# Patient Record
Sex: MALE | Race: WHITE | URBAN - METROPOLITAN AREA
[De-identification: names, ages, dates, MRNs, and addresses within clinical notes are randomized per-mention and may not be internally consistent; named-entity substitution may affect disease eponyms.]

---

## 2017-01-01 ENCOUNTER — APPOINTMENT (OUTPATIENT)
Dept: GENERAL RADIOLOGY | Facility: CLINIC | Age: 67
DRG: 064 | End: 2017-01-01
Attending: INTERNAL MEDICINE
Payer: MEDICARE

## 2017-01-01 ENCOUNTER — HOSPITAL ENCOUNTER (INPATIENT)
Facility: CLINIC | Age: 67
LOS: 8 days | Discharge: HOSPICE | DRG: 064 | End: 2017-06-03
Attending: EMERGENCY MEDICINE | Admitting: PSYCHIATRY & NEUROLOGY
Payer: MEDICARE

## 2017-01-01 ENCOUNTER — APPOINTMENT (OUTPATIENT)
Dept: MRI IMAGING | Facility: CLINIC | Age: 67
DRG: 064 | End: 2017-01-01
Attending: PSYCHIATRY & NEUROLOGY
Payer: MEDICARE

## 2017-01-01 ENCOUNTER — APPOINTMENT (OUTPATIENT)
Dept: CT IMAGING | Facility: CLINIC | Age: 67
DRG: 064 | End: 2017-01-01
Attending: EMERGENCY MEDICINE
Payer: MEDICARE

## 2017-01-01 ENCOUNTER — APPOINTMENT (OUTPATIENT)
Dept: PHYSICAL THERAPY | Facility: CLINIC | Age: 67
DRG: 064 | End: 2017-01-01
Attending: INTERNAL MEDICINE
Payer: MEDICARE

## 2017-01-01 ENCOUNTER — APPOINTMENT (OUTPATIENT)
Dept: OCCUPATIONAL THERAPY | Facility: CLINIC | Age: 67
DRG: 064 | End: 2017-01-01
Attending: INTERNAL MEDICINE
Payer: MEDICARE

## 2017-01-01 ENCOUNTER — APPOINTMENT (OUTPATIENT)
Dept: CARDIOLOGY | Facility: CLINIC | Age: 67
DRG: 064 | End: 2017-01-01
Attending: NURSE PRACTITIONER
Payer: MEDICARE

## 2017-01-01 ENCOUNTER — APPOINTMENT (OUTPATIENT)
Dept: GENERAL RADIOLOGY | Facility: CLINIC | Age: 67
DRG: 064 | End: 2017-01-01
Attending: NURSE PRACTITIONER
Payer: MEDICARE

## 2017-01-01 ENCOUNTER — APPOINTMENT (OUTPATIENT)
Dept: GENERAL RADIOLOGY | Facility: CLINIC | Age: 67
DRG: 064 | End: 2017-01-01
Attending: PSYCHIATRY & NEUROLOGY
Payer: MEDICARE

## 2017-01-01 VITALS
WEIGHT: 121.03 LBS | BODY MASS INDEX: 20.17 KG/M2 | HEIGHT: 65 IN | RESPIRATION RATE: 16 BRPM | SYSTOLIC BLOOD PRESSURE: 130 MMHG | DIASTOLIC BLOOD PRESSURE: 89 MMHG | TEMPERATURE: 98.3 F | OXYGEN SATURATION: 92 %

## 2017-01-01 DIAGNOSIS — K59.03 DRUG-INDUCED CONSTIPATION: ICD-10-CM

## 2017-01-01 DIAGNOSIS — R53.1 LEFT-SIDED WEAKNESS: ICD-10-CM

## 2017-01-01 DIAGNOSIS — B37.0 THRUSH: ICD-10-CM

## 2017-01-01 DIAGNOSIS — G40.909 RECURRENT SEIZURES (H): ICD-10-CM

## 2017-01-01 DIAGNOSIS — J96.01 ACUTE RESPIRATORY FAILURE WITH HYPOXIA (H): ICD-10-CM

## 2017-01-01 DIAGNOSIS — F17.219 CIGARETTE NICOTINE DEPENDENCE WITH NICOTINE-INDUCED DISORDER: ICD-10-CM

## 2017-01-01 DIAGNOSIS — I63.9 CEREBROVASCULAR ACCIDENT (CVA), UNSPECIFIED MECHANISM (H): Primary | ICD-10-CM

## 2017-01-01 LAB
ALBUMIN SERPL-MCNC: 2.1 G/DL (ref 3.4–5)
ALBUMIN SERPL-MCNC: 2.5 G/DL (ref 3.4–5)
ALBUMIN SERPL-MCNC: 2.8 G/DL (ref 3.4–5)
ALBUMIN SERPL-MCNC: 3.2 G/DL (ref 3.4–5)
ALBUMIN UR-MCNC: NEGATIVE MG/DL
ALP SERPL-CCNC: 209 U/L (ref 40–150)
ALP SERPL-CCNC: 52 U/L (ref 40–150)
ALP SERPL-CCNC: 54 U/L (ref 40–150)
ALP SERPL-CCNC: 57 U/L (ref 40–150)
ALT SERPL W P-5'-P-CCNC: 17 U/L (ref 0–70)
ALT SERPL W P-5'-P-CCNC: 18 U/L (ref 0–70)
ALT SERPL W P-5'-P-CCNC: 18 U/L (ref 0–70)
ALT SERPL W P-5'-P-CCNC: 25 U/L (ref 0–70)
AMMONIA PLAS-SCNC: 24 UMOL/L (ref 10–50)
AMPHETAMINES UR QL SCN: ABNORMAL
AMPHETAMINES UR QL SCN: ABNORMAL
ANION GAP SERPL CALCULATED.3IONS-SCNC: 10 MMOL/L (ref 3–14)
ANION GAP SERPL CALCULATED.3IONS-SCNC: 13 MMOL/L (ref 3–14)
ANION GAP SERPL CALCULATED.3IONS-SCNC: 6 MMOL/L (ref 3–14)
ANION GAP SERPL CALCULATED.3IONS-SCNC: 7 MMOL/L (ref 3–14)
ANION GAP SERPL CALCULATED.3IONS-SCNC: 7 MMOL/L (ref 3–14)
ANION GAP SERPL CALCULATED.3IONS-SCNC: 8 MMOL/L (ref 3–14)
ANION GAP SERPL CALCULATED.3IONS-SCNC: 9 MMOL/L (ref 3–14)
APPEARANCE UR: CLEAR
APTT PPP: 28 SEC (ref 22–37)
AST SERPL W P-5'-P-CCNC: 22 U/L (ref 0–45)
AST SERPL W P-5'-P-CCNC: 25 U/L (ref 0–45)
AST SERPL W P-5'-P-CCNC: 31 U/L (ref 0–45)
AST SERPL W P-5'-P-CCNC: 56 U/L (ref 0–45)
BACTERIA SPEC CULT: NO GROWTH
BACTERIA SPEC CULT: NO GROWTH
BARBITURATES UR QL: ABNORMAL
BARBITURATES UR QL: ABNORMAL
BASE DEFICIT BLDA-SCNC: 1.2 MMOL/L
BASE DEFICIT BLDA-SCNC: 1.9 MMOL/L
BASE DEFICIT BLDA-SCNC: 3.9 MMOL/L
BASE DEFICIT BLDV-SCNC: 3.3 MMOL/L
BASE DEFICIT BLDV-SCNC: 6.4 MMOL/L
BASOPHILS # BLD AUTO: 0 10E9/L (ref 0–0.2)
BASOPHILS # BLD AUTO: 0 10E9/L (ref 0–0.2)
BASOPHILS NFR BLD AUTO: 0.2 %
BASOPHILS NFR BLD AUTO: 0.2 %
BENZODIAZ UR QL: ABNORMAL
BENZODIAZ UR QL: ABNORMAL
BILIRUB DIRECT SERPL-MCNC: 0.1 MG/DL (ref 0–0.2)
BILIRUB SERPL-MCNC: 0.4 MG/DL (ref 0.2–1.3)
BILIRUB SERPL-MCNC: 0.5 MG/DL (ref 0.2–1.3)
BILIRUB SERPL-MCNC: 0.6 MG/DL (ref 0.2–1.3)
BILIRUB SERPL-MCNC: 0.6 MG/DL (ref 0.2–1.3)
BILIRUB UR QL STRIP: NEGATIVE
BUN SERPL-MCNC: 10 MG/DL (ref 7–30)
BUN SERPL-MCNC: 10 MG/DL (ref 7–30)
BUN SERPL-MCNC: 12 MG/DL (ref 7–30)
BUN SERPL-MCNC: 13 MG/DL (ref 7–30)
BUN SERPL-MCNC: 14 MG/DL (ref 7–30)
BUN SERPL-MCNC: 15 MG/DL (ref 7–30)
BUN SERPL-MCNC: 17 MG/DL (ref 7–30)
CALCIUM SERPL-MCNC: 7.7 MG/DL (ref 8.5–10.1)
CALCIUM SERPL-MCNC: 8 MG/DL (ref 8.5–10.1)
CALCIUM SERPL-MCNC: 8.2 MG/DL (ref 8.5–10.1)
CALCIUM SERPL-MCNC: 8.3 MG/DL (ref 8.5–10.1)
CALCIUM SERPL-MCNC: 8.4 MG/DL (ref 8.5–10.1)
CALCIUM SERPL-MCNC: 8.6 MG/DL (ref 8.5–10.1)
CALCIUM SERPL-MCNC: 8.6 MG/DL (ref 8.5–10.1)
CANNABINOIDS UR QL SCN: ABNORMAL
CANNABINOIDS UR QL SCN: ABNORMAL
CHLORIDE SERPL-SCNC: 103 MMOL/L (ref 94–109)
CHLORIDE SERPL-SCNC: 104 MMOL/L (ref 94–109)
CHLORIDE SERPL-SCNC: 105 MMOL/L (ref 94–109)
CHLORIDE SERPL-SCNC: 106 MMOL/L (ref 94–109)
CHLORIDE SERPL-SCNC: 107 MMOL/L (ref 94–109)
CHLORIDE SERPL-SCNC: 109 MMOL/L (ref 94–109)
CHLORIDE SERPL-SCNC: 112 MMOL/L (ref 94–109)
CO2 SERPL-SCNC: 20 MMOL/L (ref 20–32)
CO2 SERPL-SCNC: 22 MMOL/L (ref 20–32)
CO2 SERPL-SCNC: 23 MMOL/L (ref 20–32)
CO2 SERPL-SCNC: 23 MMOL/L (ref 20–32)
CO2 SERPL-SCNC: 25 MMOL/L (ref 20–32)
COCAINE UR QL: ABNORMAL
COCAINE UR QL: ABNORMAL
COLOR UR AUTO: ABNORMAL
CREAT SERPL-MCNC: 0.56 MG/DL (ref 0.66–1.25)
CREAT SERPL-MCNC: 0.61 MG/DL (ref 0.66–1.25)
CREAT SERPL-MCNC: 0.7 MG/DL (ref 0.66–1.25)
CREAT SERPL-MCNC: 0.83 MG/DL (ref 0.66–1.25)
CREAT SERPL-MCNC: 0.83 MG/DL (ref 0.66–1.25)
CREAT SERPL-MCNC: 0.87 MG/DL (ref 0.66–1.25)
CREAT SERPL-MCNC: 1.2 MG/DL (ref 0.66–1.25)
DEPRECATED CALCIDIOL+CALCIFEROL SERPL-MC: 11 UG/L (ref 20–75)
DIFFERENTIAL METHOD BLD: ABNORMAL
DIFFERENTIAL METHOD BLD: ABNORMAL
EOSINOPHIL # BLD AUTO: 0 10E9/L (ref 0–0.7)
EOSINOPHIL # BLD AUTO: 0.1 10E9/L (ref 0–0.7)
EOSINOPHIL NFR BLD AUTO: 0.2 %
EOSINOPHIL NFR BLD AUTO: 0.4 %
ERYTHROCYTE [DISTWIDTH] IN BLOOD BY AUTOMATED COUNT: 14.4 % (ref 10–15)
ERYTHROCYTE [DISTWIDTH] IN BLOOD BY AUTOMATED COUNT: 14.5 % (ref 10–15)
ERYTHROCYTE [DISTWIDTH] IN BLOOD BY AUTOMATED COUNT: 15 % (ref 10–15)
ERYTHROCYTE [DISTWIDTH] IN BLOOD BY AUTOMATED COUNT: 15.1 % (ref 10–15)
ERYTHROCYTE [DISTWIDTH] IN BLOOD BY AUTOMATED COUNT: 15.3 % (ref 10–15)
ERYTHROCYTE [DISTWIDTH] IN BLOOD BY AUTOMATED COUNT: 15.6 % (ref 10–15)
ERYTHROCYTE [DISTWIDTH] IN BLOOD BY AUTOMATED COUNT: 15.7 % (ref 10–15)
ETHANOL UR QL SCN: ABNORMAL
GFR SERPL CREATININE-BSD FRML MDRD: 61 ML/MIN/1.7M2
GFR SERPL CREATININE-BSD FRML MDRD: 89 ML/MIN/1.7M2
GFR SERPL CREATININE-BSD FRML MDRD: ABNORMAL ML/MIN/1.7M2
GLUCOSE BLDC GLUCOMTR-MCNC: 156 MG/DL (ref 70–99)
GLUCOSE BLDC GLUCOMTR-MCNC: 176 MG/DL (ref 70–99)
GLUCOSE BLDC GLUCOMTR-MCNC: 196 MG/DL (ref 70–99)
GLUCOSE BLDC GLUCOMTR-MCNC: 98 MG/DL (ref 70–99)
GLUCOSE SERPL-MCNC: 101 MG/DL (ref 70–99)
GLUCOSE SERPL-MCNC: 104 MG/DL (ref 70–99)
GLUCOSE SERPL-MCNC: 108 MG/DL (ref 70–99)
GLUCOSE SERPL-MCNC: 125 MG/DL (ref 70–99)
GLUCOSE SERPL-MCNC: 156 MG/DL (ref 70–99)
GLUCOSE SERPL-MCNC: 163 MG/DL (ref 70–99)
GLUCOSE SERPL-MCNC: 62 MG/DL (ref 70–99)
GLUCOSE UR STRIP-MCNC: 70 MG/DL
HCO3 BLD-SCNC: 21 MMOL/L (ref 21–28)
HCO3 BLD-SCNC: 22 MMOL/L (ref 21–28)
HCO3 BLD-SCNC: 23 MMOL/L (ref 21–28)
HCO3 BLDV-SCNC: 20 MMOL/L (ref 21–28)
HCO3 BLDV-SCNC: 21 MMOL/L (ref 21–28)
HCT VFR BLD AUTO: 31.2 % (ref 40–53)
HCT VFR BLD AUTO: 32.2 % (ref 40–53)
HCT VFR BLD AUTO: 34.7 % (ref 40–53)
HCT VFR BLD AUTO: 34.7 % (ref 40–53)
HCT VFR BLD AUTO: 35.7 % (ref 40–53)
HCT VFR BLD AUTO: 35.8 % (ref 40–53)
HCT VFR BLD AUTO: 36.6 % (ref 40–53)
HGB BLD-MCNC: 10.3 G/DL (ref 13.3–17.7)
HGB BLD-MCNC: 10.8 G/DL (ref 13.3–17.7)
HGB BLD-MCNC: 11.6 G/DL (ref 13.3–17.7)
HGB BLD-MCNC: 11.8 G/DL (ref 13.3–17.7)
HGB BLD-MCNC: 11.8 G/DL (ref 13.3–17.7)
HGB BLD-MCNC: 11.9 G/DL (ref 13.3–17.7)
HGB BLD-MCNC: 12.6 G/DL (ref 13.3–17.7)
HGB UR QL STRIP: NEGATIVE
HYALINE CASTS #/AREA URNS LPF: 1 /LPF (ref 0–2)
IMM GRANULOCYTES # BLD: 0.1 10E9/L (ref 0–0.4)
IMM GRANULOCYTES # BLD: 0.1 10E9/L (ref 0–0.4)
IMM GRANULOCYTES NFR BLD: 0.5 %
IMM GRANULOCYTES NFR BLD: 0.5 %
INR PPP: 1.1 (ref 0.86–1.14)
INTERPRETATION ECG - MUSE: NORMAL
KETONES UR STRIP-MCNC: NEGATIVE MG/DL
LACTATE BLD-SCNC: 1.2 MMOL/L (ref 0.7–2.1)
LACTATE BLD-SCNC: 2.1 MMOL/L (ref 0.7–2.1)
LACTATE BLD-SCNC: 2.7 MMOL/L (ref 0.7–2.1)
LACTATE BLD-SCNC: 3.4 MMOL/L (ref 0.7–2.1)
LACTATE BLD-SCNC: 3.6 MMOL/L (ref 0.7–2.1)
LACTATE BLD-SCNC: 4.1 MMOL/L (ref 0.7–2.1)
LEUKOCYTE ESTERASE UR QL STRIP: NEGATIVE
LYMPHOCYTES # BLD AUTO: 1 10E9/L (ref 0.8–5.3)
LYMPHOCYTES # BLD AUTO: 1.3 10E9/L (ref 0.8–5.3)
LYMPHOCYTES NFR BLD AUTO: 10.8 %
LYMPHOCYTES NFR BLD AUTO: 5.4 %
Lab: NORMAL
Lab: NORMAL
MAGNESIUM RBC-SCNC: 2.6 MMOL/L
MAGNESIUM SERPL-MCNC: 1.8 MG/DL (ref 1.6–2.3)
MAGNESIUM SERPL-MCNC: 1.8 MG/DL (ref 1.6–2.3)
MAGNESIUM SERPL-MCNC: 1.9 MG/DL (ref 1.6–2.3)
MAGNESIUM SERPL-MCNC: 1.9 MG/DL (ref 1.6–2.3)
MAGNESIUM SERPL-MCNC: 2 MG/DL (ref 1.6–2.3)
MAGNESIUM SERPL-MCNC: 2.1 MG/DL (ref 1.6–2.3)
MAGNESIUM SERPL-MCNC: 2.3 MG/DL (ref 1.6–2.3)
MAGNESIUM SERPL-MCNC: 2.5 MG/DL (ref 1.6–2.3)
MCH RBC QN AUTO: 30 PG (ref 26.5–33)
MCH RBC QN AUTO: 30.3 PG (ref 26.5–33)
MCH RBC QN AUTO: 30.3 PG (ref 26.5–33)
MCH RBC QN AUTO: 30.4 PG (ref 26.5–33)
MCH RBC QN AUTO: 30.5 PG (ref 26.5–33)
MCH RBC QN AUTO: 30.5 PG (ref 26.5–33)
MCH RBC QN AUTO: 30.7 PG (ref 26.5–33)
MCHC RBC AUTO-ENTMCNC: 33 G/DL (ref 31.5–36.5)
MCHC RBC AUTO-ENTMCNC: 33 G/DL (ref 31.5–36.5)
MCHC RBC AUTO-ENTMCNC: 33.3 G/DL (ref 31.5–36.5)
MCHC RBC AUTO-ENTMCNC: 33.4 G/DL (ref 31.5–36.5)
MCHC RBC AUTO-ENTMCNC: 33.5 G/DL (ref 31.5–36.5)
MCHC RBC AUTO-ENTMCNC: 34 G/DL (ref 31.5–36.5)
MCHC RBC AUTO-ENTMCNC: 34.4 G/DL (ref 31.5–36.5)
MCV RBC AUTO: 89 FL (ref 78–100)
MCV RBC AUTO: 90 FL (ref 78–100)
MCV RBC AUTO: 91 FL (ref 78–100)
MCV RBC AUTO: 92 FL (ref 78–100)
MICRO REPORT STATUS: NORMAL
MICRO REPORT STATUS: NORMAL
MONOCYTES # BLD AUTO: 0.5 10E9/L (ref 0–1.3)
MONOCYTES # BLD AUTO: 0.5 10E9/L (ref 0–1.3)
MONOCYTES NFR BLD AUTO: 2.6 %
MONOCYTES NFR BLD AUTO: 3.7 %
MRSA DNA SPEC QL NAA+PROBE: NORMAL
MUCOUS THREADS #/AREA URNS LPF: PRESENT /LPF
NEUTROPHILS # BLD AUTO: 10.3 10E9/L (ref 1.6–8.3)
NEUTROPHILS # BLD AUTO: 16.3 10E9/L (ref 1.6–8.3)
NEUTROPHILS NFR BLD AUTO: 84.6 %
NEUTROPHILS NFR BLD AUTO: 90.9 %
NITRATE UR QL: NEGATIVE
NRBC # BLD AUTO: 0 10*3/UL
NRBC # BLD AUTO: 0 10*3/UL
NRBC BLD AUTO-RTO: 0 /100
NRBC BLD AUTO-RTO: 0 /100
O2/TOTAL GAS SETTING VFR VENT: ABNORMAL %
OPIATES UR QL SCN: ABNORMAL
OPIATES UR QL SCN: ABNORMAL
OXYHGB MFR BLD: 97 % (ref 92–100)
OXYHGB MFR BLD: 98 % (ref 92–100)
OXYHGB MFR BLDV: 72 %
OXYHGB MFR BLDV: 81 %
PCO2 BLD: 32 MM HG (ref 35–45)
PCO2 BLD: 38 MM HG (ref 35–45)
PCO2 BLD: 39 MM HG (ref 35–45)
PCO2 BLDV: 33 MM HG (ref 40–50)
PCO2 BLDV: 41 MM HG (ref 40–50)
PCP UR QL SCN: ABNORMAL
PCP UR QL SCN: ABNORMAL
PH BLD: 7.34 PH (ref 7.35–7.45)
PH BLD: 7.4 PH (ref 7.35–7.45)
PH BLD: 7.44 PH (ref 7.35–7.45)
PH BLDV: 7.29 PH (ref 7.32–7.43)
PH BLDV: 7.41 PH (ref 7.32–7.43)
PH UR STRIP: 7 PH (ref 5–7)
PHENYTOIN FREE SERPL-MCNC: 1.35 UG/ML
PHENYTOIN FREE SERPL-MCNC: 1.68 UG/ML
PHENYTOIN SERPL-MCNC: 12.3 MG/L (ref 10–20)
PHENYTOIN SERPL-MCNC: 7 MG/L (ref 10–20)
PHOSPHATE SERPL-MCNC: 1.9 MG/DL (ref 2.5–4.5)
PHOSPHATE SERPL-MCNC: 2.6 MG/DL (ref 2.5–4.5)
PHOSPHATE SERPL-MCNC: 2.9 MG/DL (ref 2.5–4.5)
PHOSPHATE SERPL-MCNC: 3 MG/DL (ref 2.5–4.5)
PHOSPHATE SERPL-MCNC: 3.3 MG/DL (ref 2.5–4.5)
PHOSPHATE SERPL-MCNC: 3.3 MG/DL (ref 2.5–4.5)
PHOSPHATE SERPL-MCNC: 3.4 MG/DL (ref 2.5–4.5)
PHOSPHATE SERPL-MCNC: 3.9 MG/DL (ref 2.5–4.5)
PLATELET # BLD AUTO: 169 10E9/L (ref 150–450)
PLATELET # BLD AUTO: 173 10E9/L (ref 150–450)
PLATELET # BLD AUTO: 236 10E9/L (ref 150–450)
PLATELET # BLD AUTO: 237 10E9/L (ref 150–450)
PLATELET # BLD AUTO: 240 10E9/L (ref 150–450)
PLATELET # BLD AUTO: 271 10E9/L (ref 150–450)
PLATELET # BLD AUTO: 309 10E9/L (ref 150–450)
PO2 BLD: 109 MM HG (ref 80–105)
PO2 BLD: 111 MM HG (ref 80–105)
PO2 BLD: 151 MM HG (ref 80–105)
PO2 BLDV: 44 MM HG (ref 25–47)
PO2 BLDV: 45 MM HG (ref 25–47)
POTASSIUM SERPL-SCNC: 3.2 MMOL/L (ref 3.4–5.3)
POTASSIUM SERPL-SCNC: 3.7 MMOL/L (ref 3.4–5.3)
POTASSIUM SERPL-SCNC: 3.8 MMOL/L (ref 3.4–5.3)
POTASSIUM SERPL-SCNC: 3.9 MMOL/L (ref 3.4–5.3)
POTASSIUM SERPL-SCNC: 4.2 MMOL/L (ref 3.4–5.3)
POTASSIUM SERPL-SCNC: 4.5 MMOL/L (ref 3.4–5.3)
POTASSIUM SERPL-SCNC: 5 MMOL/L (ref 3.4–5.3)
PROCALCITONIN SERPL-MCNC: NORMAL NG/ML
PROT SERPL-MCNC: 6.3 G/DL (ref 6.8–8.8)
PROT SERPL-MCNC: 6.4 G/DL (ref 6.8–8.8)
PROT SERPL-MCNC: 6.4 G/DL (ref 6.8–8.8)
PROT SERPL-MCNC: 6.7 G/DL (ref 6.8–8.8)
RBC # BLD AUTO: 3.43 10E12/L (ref 4.4–5.9)
RBC # BLD AUTO: 3.54 10E12/L (ref 4.4–5.9)
RBC # BLD AUTO: 3.81 10E12/L (ref 4.4–5.9)
RBC # BLD AUTO: 3.87 10E12/L (ref 4.4–5.9)
RBC # BLD AUTO: 3.9 10E12/L (ref 4.4–5.9)
RBC # BLD AUTO: 3.93 10E12/L (ref 4.4–5.9)
RBC # BLD AUTO: 4.1 10E12/L (ref 4.4–5.9)
RBC #/AREA URNS AUTO: 0 /HPF (ref 0–2)
SODIUM SERPL-SCNC: 135 MMOL/L (ref 133–144)
SODIUM SERPL-SCNC: 135 MMOL/L (ref 133–144)
SODIUM SERPL-SCNC: 137 MMOL/L (ref 133–144)
SODIUM SERPL-SCNC: 138 MMOL/L (ref 133–144)
SODIUM SERPL-SCNC: 140 MMOL/L (ref 133–144)
SODIUM SERPL-SCNC: 140 MMOL/L (ref 133–144)
SODIUM SERPL-SCNC: 144 MMOL/L (ref 133–144)
SP GR UR STRIP: 1.01 (ref 1–1.03)
SPECIMEN SOURCE: NORMAL
TROPONIN I SERPL-MCNC: NORMAL UG/L (ref 0–0.04)
URN SPEC COLLECT METH UR: ABNORMAL
UROBILINOGEN UR STRIP-MCNC: NORMAL MG/DL (ref 0–2)
VIT B1 BLD-MCNC: 345 UG/DL
VIT B12 SERPL-MCNC: 312 PG/ML (ref 193–986)
VIT B6 SERPL-MCNC: 10.6 NG/ML
WBC # BLD AUTO: 12.2 10E9/L (ref 4–11)
WBC # BLD AUTO: 13.1 10E9/L (ref 4–11)
WBC # BLD AUTO: 13.8 10E9/L (ref 4–11)
WBC # BLD AUTO: 16.1 10E9/L (ref 4–11)
WBC # BLD AUTO: 18 10E9/L (ref 4–11)
WBC # BLD AUTO: 20.7 10E9/L (ref 4–11)
WBC # BLD AUTO: 8.9 10E9/L (ref 4–11)
WBC #/AREA URNS AUTO: <1 /HPF (ref 0–2)

## 2017-01-01 PROCEDURE — 99291 CRITICAL CARE FIRST HOUR: CPT | Performed by: NURSE PRACTITIONER

## 2017-01-01 PROCEDURE — A9270 NON-COVERED ITEM OR SERVICE: HCPCS | Mod: GY | Performed by: NURSE PRACTITIONER

## 2017-01-01 PROCEDURE — 83735 ASSAY OF MAGNESIUM: CPT | Performed by: NURSE PRACTITIONER

## 2017-01-01 PROCEDURE — 25000128 H RX IP 250 OP 636: Performed by: PSYCHIATRY & NEUROLOGY

## 2017-01-01 PROCEDURE — 25000128 H RX IP 250 OP 636: Performed by: NURSE PRACTITIONER

## 2017-01-01 PROCEDURE — 94640 AIRWAY INHALATION TREATMENT: CPT | Mod: 76

## 2017-01-01 PROCEDURE — 80185 ASSAY OF PHENYTOIN TOTAL: CPT | Performed by: INTERNAL MEDICINE

## 2017-01-01 PROCEDURE — 85027 COMPLETE CBC AUTOMATED: CPT | Performed by: NURSE PRACTITIONER

## 2017-01-01 PROCEDURE — 25000132 ZZH RX MED GY IP 250 OP 250 PS 637: Mod: GY | Performed by: NURSE PRACTITIONER

## 2017-01-01 PROCEDURE — 40000986 XR CHEST PORT 1 VW

## 2017-01-01 PROCEDURE — 25000125 ZZHC RX 250: Performed by: PSYCHIATRY & NEUROLOGY

## 2017-01-01 PROCEDURE — 25000125 ZZHC RX 250: Performed by: ANESTHESIOLOGY

## 2017-01-01 PROCEDURE — 84145 PROCALCITONIN (PCT): CPT | Performed by: PSYCHIATRY & NEUROLOGY

## 2017-01-01 PROCEDURE — 25000128 H RX IP 250 OP 636

## 2017-01-01 PROCEDURE — 70470 CT HEAD/BRAIN W/O & W/DYE: CPT | Mod: XS

## 2017-01-01 PROCEDURE — 97112 NEUROMUSCULAR REEDUCATION: CPT | Mod: GO

## 2017-01-01 PROCEDURE — 40000239 ZZH STATISTIC VAT ROUNDS

## 2017-01-01 PROCEDURE — 94003 VENT MGMT INPAT SUBQ DAY: CPT

## 2017-01-01 PROCEDURE — 71010 XR CHEST PORT 1 VW: CPT

## 2017-01-01 PROCEDURE — 99291 CRITICAL CARE FIRST HOUR: CPT | Performed by: INTERNAL MEDICINE

## 2017-01-01 PROCEDURE — 25000128 H RX IP 250 OP 636: Performed by: INTERNAL MEDICINE

## 2017-01-01 PROCEDURE — 83735 ASSAY OF MAGNESIUM: CPT | Performed by: INTERNAL MEDICINE

## 2017-01-01 PROCEDURE — 97162 PT EVAL MOD COMPLEX 30 MIN: CPT | Mod: GP | Performed by: PHYSICAL THERAPIST

## 2017-01-01 PROCEDURE — 84100 ASSAY OF PHOSPHORUS: CPT | Performed by: INTERNAL MEDICINE

## 2017-01-01 PROCEDURE — 25000125 ZZHC RX 250

## 2017-01-01 PROCEDURE — 70498 CT ANGIOGRAPHY NECK: CPT

## 2017-01-01 PROCEDURE — 80307 DRUG TEST PRSMV CHEM ANLYZR: CPT | Performed by: PSYCHIATRY & NEUROLOGY

## 2017-01-01 PROCEDURE — 25000125 ZZHC RX 250: Performed by: NURSE PRACTITIONER

## 2017-01-01 PROCEDURE — 94002 VENT MGMT INPAT INIT DAY: CPT

## 2017-01-01 PROCEDURE — 25800025 ZZH RX 258: Performed by: NURSE PRACTITIONER

## 2017-01-01 PROCEDURE — 5A1955Z RESPIRATORY VENTILATION, GREATER THAN 96 CONSECUTIVE HOURS: ICD-10-PCS | Performed by: EMERGENCY MEDICINE

## 2017-01-01 PROCEDURE — 95813 EEG EXTND MNTR 61-119 MIN: CPT

## 2017-01-01 PROCEDURE — 84207 ASSAY OF VITAMIN B-6: CPT | Performed by: PSYCHIATRY & NEUROLOGY

## 2017-01-01 PROCEDURE — 40000008 ZZH STATISTIC AIRWAY CARE

## 2017-01-01 PROCEDURE — 74000 XR ABDOMEN PORT F1 VW: CPT

## 2017-01-01 PROCEDURE — 87040 BLOOD CULTURE FOR BACTERIA: CPT | Performed by: NURSE PRACTITIONER

## 2017-01-01 PROCEDURE — 99239 HOSP IP/OBS DSCHRG MGMT >30: CPT | Performed by: INTERNAL MEDICINE

## 2017-01-01 PROCEDURE — 25000132 ZZH RX MED GY IP 250 OP 250 PS 637: Performed by: INTERNAL MEDICINE

## 2017-01-01 PROCEDURE — 85025 COMPLETE CBC W/AUTO DIFF WBC: CPT | Performed by: INTERNAL MEDICINE

## 2017-01-01 PROCEDURE — 20000003 ZZH R&B ICU

## 2017-01-01 PROCEDURE — 25000132 ZZH RX MED GY IP 250 OP 250 PS 637: Performed by: NURSE PRACTITIONER

## 2017-01-01 PROCEDURE — 99207 ZZC APP CREDIT; MD BILLING SHARED VISIT: CPT | Performed by: INTERNAL MEDICINE

## 2017-01-01 PROCEDURE — P9041 ALBUMIN (HUMAN),5%, 50ML: HCPCS

## 2017-01-01 PROCEDURE — 80053 COMPREHEN METABOLIC PANEL: CPT | Performed by: INTERNAL MEDICINE

## 2017-01-01 PROCEDURE — 94640 AIRWAY INHALATION TREATMENT: CPT

## 2017-01-01 PROCEDURE — 84100 ASSAY OF PHOSPHORUS: CPT | Performed by: PSYCHIATRY & NEUROLOGY

## 2017-01-01 PROCEDURE — 93306 TTE W/DOPPLER COMPLETE: CPT | Mod: 26 | Performed by: INTERNAL MEDICINE

## 2017-01-01 PROCEDURE — 80053 COMPREHEN METABOLIC PANEL: CPT | Performed by: NURSE PRACTITIONER

## 2017-01-01 PROCEDURE — 87640 STAPH A DNA AMP PROBE: CPT | Performed by: NURSE PRACTITIONER

## 2017-01-01 PROCEDURE — A9270 NON-COVERED ITEM OR SERVICE: HCPCS | Mod: GY | Performed by: PSYCHIATRY & NEUROLOGY

## 2017-01-01 PROCEDURE — 82805 BLOOD GASES W/O2 SATURATION: CPT | Performed by: INTERNAL MEDICINE

## 2017-01-01 PROCEDURE — 83605 ASSAY OF LACTIC ACID: CPT | Performed by: PSYCHIATRY & NEUROLOGY

## 2017-01-01 PROCEDURE — 85027 COMPLETE CBC AUTOMATED: CPT | Performed by: INTERNAL MEDICINE

## 2017-01-01 PROCEDURE — 36600 WITHDRAWAL OF ARTERIAL BLOOD: CPT

## 2017-01-01 PROCEDURE — 40000275 ZZH STATISTIC RCP TIME EA 10 MIN

## 2017-01-01 PROCEDURE — 97535 SELF CARE MNGMENT TRAINING: CPT | Mod: GO

## 2017-01-01 PROCEDURE — 85610 PROTHROMBIN TIME: CPT | Performed by: EMERGENCY MEDICINE

## 2017-01-01 PROCEDURE — 40000133 ZZH STATISTIC OT WARD VISIT

## 2017-01-01 PROCEDURE — 70551 MRI BRAIN STEM W/O DYE: CPT

## 2017-01-01 PROCEDURE — 25800025 ZZH RX 258: Performed by: INTERNAL MEDICINE

## 2017-01-01 PROCEDURE — 82805 BLOOD GASES W/O2 SATURATION: CPT | Performed by: EMERGENCY MEDICINE

## 2017-01-01 PROCEDURE — 00000146 ZZHCL STATISTIC GLUCOSE BY METER IP

## 2017-01-01 PROCEDURE — 84100 ASSAY OF PHOSPHORUS: CPT | Performed by: NURSE PRACTITIONER

## 2017-01-01 PROCEDURE — 25000125 ZZHC RX 250: Performed by: SURGERY

## 2017-01-01 PROCEDURE — 80048 BASIC METABOLIC PNL TOTAL CA: CPT | Performed by: INTERNAL MEDICINE

## 2017-01-01 PROCEDURE — 99223 1ST HOSP IP/OBS HIGH 75: CPT | Performed by: PHYSICIAN ASSISTANT

## 2017-01-01 PROCEDURE — 82805 BLOOD GASES W/O2 SATURATION: CPT | Performed by: NURSE PRACTITIONER

## 2017-01-01 PROCEDURE — 99207 ZZC CDG-CORRECTLY CODED, REVIEWED AND AGREE: CPT | Performed by: NURSE PRACTITIONER

## 2017-01-01 PROCEDURE — 12000000 ZZH R&B MED SURG/OB

## 2017-01-01 PROCEDURE — 25000132 ZZH RX MED GY IP 250 OP 250 PS 637: Mod: GY | Performed by: PSYCHIATRY & NEUROLOGY

## 2017-01-01 PROCEDURE — 82140 ASSAY OF AMMONIA: CPT | Performed by: INTERNAL MEDICINE

## 2017-01-01 PROCEDURE — 80048 BASIC METABOLIC PNL TOTAL CA: CPT | Performed by: NURSE PRACTITIONER

## 2017-01-01 PROCEDURE — 94660 CPAP INITIATION&MGMT: CPT

## 2017-01-01 PROCEDURE — 25000125 ZZHC RX 250: Performed by: EMERGENCY MEDICINE

## 2017-01-01 PROCEDURE — 82306 VITAMIN D 25 HYDROXY: CPT | Performed by: PSYCHIATRY & NEUROLOGY

## 2017-01-01 PROCEDURE — 40000264 ECHO COMPLETE WITH LUMASON

## 2017-01-01 PROCEDURE — 87641 MR-STAPH DNA AMP PROBE: CPT | Performed by: NURSE PRACTITIONER

## 2017-01-01 PROCEDURE — 84425 ASSAY OF VITAMIN B-1: CPT | Performed by: PSYCHIATRY & NEUROLOGY

## 2017-01-01 PROCEDURE — 27210437 ZZH NUTRITION PRODUCT SEMIELEM INTERMED LITER

## 2017-01-01 PROCEDURE — 25000125 ZZHC RX 250: Performed by: INTERNAL MEDICINE

## 2017-01-01 PROCEDURE — 99233 SBSQ HOSP IP/OBS HIGH 50: CPT | Performed by: INTERNAL MEDICINE

## 2017-01-01 PROCEDURE — 93005 ELECTROCARDIOGRAM TRACING: CPT

## 2017-01-01 PROCEDURE — 84100 ASSAY OF PHOSPHORUS: CPT | Performed by: EMERGENCY MEDICINE

## 2017-01-01 PROCEDURE — 97530 THERAPEUTIC ACTIVITIES: CPT | Mod: GO

## 2017-01-01 PROCEDURE — 97530 THERAPEUTIC ACTIVITIES: CPT | Mod: GP | Performed by: PHYSICAL THERAPIST

## 2017-01-01 PROCEDURE — 99232 SBSQ HOSP IP/OBS MODERATE 35: CPT | Performed by: INTERNAL MEDICINE

## 2017-01-01 PROCEDURE — 25000128 H RX IP 250 OP 636: Performed by: EMERGENCY MEDICINE

## 2017-01-01 PROCEDURE — 36569 INSJ PICC 5 YR+ W/O IMAGING: CPT

## 2017-01-01 PROCEDURE — 83605 ASSAY OF LACTIC ACID: CPT | Performed by: INTERNAL MEDICINE

## 2017-01-01 PROCEDURE — 40000193 ZZH STATISTIC PT WARD VISIT: Performed by: PHYSICAL THERAPIST

## 2017-01-01 PROCEDURE — S5010 5% DEXTROSE AND 0.45% SALINE: HCPCS | Performed by: INTERNAL MEDICINE

## 2017-01-01 PROCEDURE — 80186 ASSAY OF PHENYTOIN FREE: CPT | Performed by: INTERNAL MEDICINE

## 2017-01-01 PROCEDURE — 85025 COMPLETE CBC W/AUTO DIFF WBC: CPT | Performed by: EMERGENCY MEDICINE

## 2017-01-01 PROCEDURE — 70450 CT HEAD/BRAIN W/O DYE: CPT

## 2017-01-01 PROCEDURE — 99223 1ST HOSP IP/OBS HIGH 75: CPT | Performed by: NURSE PRACTITIONER

## 2017-01-01 PROCEDURE — 25000125 ZZHC RX 250: Performed by: PHYSICIAN ASSISTANT

## 2017-01-01 PROCEDURE — 84484 ASSAY OF TROPONIN QUANT: CPT | Performed by: PSYCHIATRY & NEUROLOGY

## 2017-01-01 PROCEDURE — 27210219 ZZH KIT SHRLOCK 5FR DBL LUM PWR P

## 2017-01-01 PROCEDURE — 99211 OFF/OP EST MAY X REQ PHY/QHP: CPT

## 2017-01-01 PROCEDURE — 40000281 ZZH STATISTIC TRANSPORT TIME EA 15 MIN

## 2017-01-01 PROCEDURE — 40000257 ZZH STATISTIC CONSULT NO CHARGE VASC ACCESS

## 2017-01-01 PROCEDURE — 87040 BLOOD CULTURE FOR BACTERIA: CPT | Performed by: EMERGENCY MEDICINE

## 2017-01-01 PROCEDURE — 83605 ASSAY OF LACTIC ACID: CPT | Performed by: EMERGENCY MEDICINE

## 2017-01-01 PROCEDURE — 83735 ASSAY OF MAGNESIUM: CPT | Performed by: EMERGENCY MEDICINE

## 2017-01-01 PROCEDURE — 85730 THROMBOPLASTIN TIME PARTIAL: CPT | Performed by: EMERGENCY MEDICINE

## 2017-01-01 PROCEDURE — 80048 BASIC METABOLIC PNL TOTAL CA: CPT | Performed by: EMERGENCY MEDICINE

## 2017-01-01 PROCEDURE — 83735 ASSAY OF MAGNESIUM: CPT | Performed by: PSYCHIATRY & NEUROLOGY

## 2017-01-01 PROCEDURE — 25500064 ZZH RX 255 OP 636: Performed by: PSYCHIATRY & NEUROLOGY

## 2017-01-01 PROCEDURE — 93010 ELECTROCARDIOGRAM REPORT: CPT | Performed by: INTERNAL MEDICINE

## 2017-01-01 PROCEDURE — 99233 SBSQ HOSP IP/OBS HIGH 50: CPT | Performed by: NURSE PRACTITIONER

## 2017-01-01 PROCEDURE — 82803 BLOOD GASES ANY COMBINATION: CPT | Performed by: PSYCHIATRY & NEUROLOGY

## 2017-01-01 PROCEDURE — 97167 OT EVAL HIGH COMPLEX 60 MIN: CPT | Mod: GO

## 2017-01-01 PROCEDURE — 40000061 ZZH STATISTIC EEG TIME EA 10 MIN

## 2017-01-01 PROCEDURE — 80076 HEPATIC FUNCTION PANEL: CPT | Performed by: PSYCHIATRY & NEUROLOGY

## 2017-01-01 PROCEDURE — 81001 URINALYSIS AUTO W/SCOPE: CPT | Performed by: NURSE PRACTITIONER

## 2017-01-01 RX ORDER — NICOTINE 21 MG/24HR
1 PATCH, TRANSDERMAL 24 HOURS TRANSDERMAL DAILY
Status: DISCONTINUED | OUTPATIENT
Start: 2017-01-01 | End: 2017-01-01 | Stop reason: HOSPADM

## 2017-01-01 RX ORDER — LORAZEPAM 0.5 MG/1
.5-1 TABLET ORAL
Status: DISCONTINUED | OUTPATIENT
Start: 2017-01-01 | End: 2017-01-01 | Stop reason: HOSPADM

## 2017-01-01 RX ORDER — PROPOFOL 10 MG/ML
INJECTION, EMULSION INTRAVENOUS
Status: COMPLETED
Start: 2017-01-01 | End: 2017-01-01

## 2017-01-01 RX ORDER — PIPERACILLIN SODIUM, TAZOBACTAM SODIUM 3; .375 G/15ML; G/15ML
3.38 INJECTION, POWDER, LYOPHILIZED, FOR SOLUTION INTRAVENOUS EVERY 6 HOURS
Status: DISCONTINUED | OUTPATIENT
Start: 2017-01-01 | End: 2017-01-01

## 2017-01-01 RX ORDER — BISACODYL 10 MG
10 SUPPOSITORY, RECTAL RECTAL
Qty: 30 SUPPOSITORY | Refills: 0 | Status: SHIPPED | OUTPATIENT
Start: 2017-01-01

## 2017-01-01 RX ORDER — POTASSIUM CHLORIDE 1.5 G/1.58G
20-40 POWDER, FOR SOLUTION ORAL
Status: DISCONTINUED | OUTPATIENT
Start: 2017-01-01 | End: 2017-01-01

## 2017-01-01 RX ORDER — ONDANSETRON 2 MG/ML
4 INJECTION INTRAMUSCULAR; INTRAVENOUS EVERY 6 HOURS PRN
Status: DISCONTINUED | OUTPATIENT
Start: 2017-01-01 | End: 2017-01-01

## 2017-01-01 RX ORDER — LORAZEPAM 2 MG/ML
4 INJECTION INTRAMUSCULAR ONCE
Status: COMPLETED | OUTPATIENT
Start: 2017-01-01 | End: 2017-01-01

## 2017-01-01 RX ORDER — MAGNESIUM SULFATE HEPTAHYDRATE 40 MG/ML
4 INJECTION, SOLUTION INTRAVENOUS EVERY 4 HOURS PRN
Status: DISCONTINUED | OUTPATIENT
Start: 2017-01-01 | End: 2017-01-01

## 2017-01-01 RX ORDER — PROPOFOL 10 MG/ML
0-40 INJECTION, EMULSION INTRAVENOUS CONTINUOUS
Status: DISCONTINUED | OUTPATIENT
Start: 2017-01-01 | End: 2017-01-01

## 2017-01-01 RX ORDER — ALBUTEROL SULFATE 0.83 MG/ML
2.5 SOLUTION RESPIRATORY (INHALATION)
Status: DISCONTINUED | OUTPATIENT
Start: 2017-01-01 | End: 2017-01-01

## 2017-01-01 RX ORDER — LIDOCAINE 40 MG/G
CREAM TOPICAL
Status: DISCONTINUED | OUTPATIENT
Start: 2017-01-01 | End: 2017-01-01

## 2017-01-01 RX ORDER — AMOXICILLIN 250 MG
1-2 CAPSULE ORAL 2 TIMES DAILY PRN
Status: DISCONTINUED | OUTPATIENT
Start: 2017-01-01 | End: 2017-01-01

## 2017-01-01 RX ORDER — LORAZEPAM 2 MG/ML
INJECTION INTRAMUSCULAR
Status: COMPLETED
Start: 2017-01-01 | End: 2017-01-01

## 2017-01-01 RX ORDER — FENTANYL CITRATE 50 UG/ML
50-100 INJECTION, SOLUTION INTRAMUSCULAR; INTRAVENOUS
Status: DISCONTINUED | OUTPATIENT
Start: 2017-01-01 | End: 2017-01-01

## 2017-01-01 RX ORDER — MORPHINE SULFATE 20 MG/ML
5-10 SOLUTION ORAL
Qty: 240 ML | Refills: 0 | Status: SHIPPED | OUTPATIENT
Start: 2017-01-01

## 2017-01-01 RX ORDER — POTASSIUM CHLORIDE 29.8 MG/ML
20 INJECTION INTRAVENOUS
Status: DISCONTINUED | OUTPATIENT
Start: 2017-01-01 | End: 2017-01-01

## 2017-01-01 RX ORDER — POTASSIUM CHLORIDE 7.45 MG/ML
10 INJECTION INTRAVENOUS
Status: DISCONTINUED | OUTPATIENT
Start: 2017-01-01 | End: 2017-01-01

## 2017-01-01 RX ORDER — HEPARIN SODIUM,PORCINE 10 UNIT/ML
2-5 VIAL (ML) INTRAVENOUS
Status: DISCONTINUED | OUTPATIENT
Start: 2017-01-01 | End: 2017-01-01

## 2017-01-01 RX ORDER — ACETAMINOPHEN 650 MG/1
650 SUPPOSITORY RECTAL EVERY 4 HOURS PRN
Status: DISCONTINUED | OUTPATIENT
Start: 2017-01-01 | End: 2017-01-01 | Stop reason: HOSPADM

## 2017-01-01 RX ORDER — POTASSIUM CHLORIDE 1500 MG/1
20-40 TABLET, EXTENDED RELEASE ORAL
Status: DISCONTINUED | OUTPATIENT
Start: 2017-01-01 | End: 2017-01-01

## 2017-01-01 RX ORDER — LORAZEPAM 2 MG/ML
.5-1 INJECTION INTRAMUSCULAR
Status: DISCONTINUED | OUTPATIENT
Start: 2017-01-01 | End: 2017-01-01 | Stop reason: HOSPADM

## 2017-01-01 RX ORDER — NYSTATIN 100000/ML
500000 SUSPENSION, ORAL (FINAL DOSE FORM) ORAL 4 TIMES DAILY
Status: DISCONTINUED | OUTPATIENT
Start: 2017-01-01 | End: 2017-01-01 | Stop reason: HOSPADM

## 2017-01-01 RX ORDER — LORAZEPAM 0.5 MG/1
.5-1 TABLET ORAL
Qty: 90 TABLET | Refills: 0 | Status: SHIPPED | OUTPATIENT
Start: 2017-01-01

## 2017-01-01 RX ORDER — NYSTATIN 100000/ML
500000 SUSPENSION, ORAL (FINAL DOSE FORM) ORAL 4 TIMES DAILY
Qty: 240 ML | Refills: 0 | Status: SHIPPED | OUTPATIENT
Start: 2017-01-01

## 2017-01-01 RX ORDER — ONDANSETRON 2 MG/ML
4 INJECTION INTRAMUSCULAR; INTRAVENOUS ONCE
Status: COMPLETED | OUTPATIENT
Start: 2017-01-01 | End: 2017-01-01

## 2017-01-01 RX ORDER — PROPOFOL 10 MG/ML
5-75 INJECTION, EMULSION INTRAVENOUS CONTINUOUS
Status: DISPENSED | OUTPATIENT
Start: 2017-01-01 | End: 2017-01-01

## 2017-01-01 RX ORDER — POLYETHYLENE GLYCOL 3350 17 G/17G
17 POWDER, FOR SOLUTION ORAL DAILY PRN
Status: DISCONTINUED | OUTPATIENT
Start: 2017-01-01 | End: 2017-01-01

## 2017-01-01 RX ORDER — BISACODYL 10 MG
10 SUPPOSITORY, RECTAL RECTAL
Status: DISCONTINUED | OUTPATIENT
Start: 2017-01-01 | End: 2017-01-01 | Stop reason: HOSPADM

## 2017-01-01 RX ORDER — POLYETHYLENE GLYCOL 3350 17 G/17G
17 POWDER, FOR SOLUTION ORAL 2 TIMES DAILY PRN
Status: DISCONTINUED | OUTPATIENT
Start: 2017-01-01 | End: 2017-01-01

## 2017-01-01 RX ORDER — ALBUMIN, HUMAN INJ 5% 5 %
SOLUTION INTRAVENOUS
Status: COMPLETED
Start: 2017-01-01 | End: 2017-01-01

## 2017-01-01 RX ORDER — NICOTINE 21 MG/24HR
1 PATCH, TRANSDERMAL 24 HOURS TRANSDERMAL DAILY
Qty: 28 PATCH | Refills: 0 | Status: SHIPPED | OUTPATIENT
Start: 2017-01-01

## 2017-01-01 RX ORDER — HALOPERIDOL 2 MG/ML
.5-1 SOLUTION ORAL EVERY 6 HOURS PRN
Status: DISCONTINUED | OUTPATIENT
Start: 2017-01-01 | End: 2017-01-01 | Stop reason: HOSPADM

## 2017-01-01 RX ORDER — SODIUM CHLORIDE 9 MG/ML
INJECTION, SOLUTION INTRAVENOUS CONTINUOUS
Status: DISCONTINUED | OUTPATIENT
Start: 2017-01-01 | End: 2017-01-01

## 2017-01-01 RX ORDER — ALBUTEROL SULFATE 0.83 MG/ML
15 SOLUTION RESPIRATORY (INHALATION) ONCE
Status: DISCONTINUED | OUTPATIENT
Start: 2017-01-01 | End: 2017-01-01

## 2017-01-01 RX ORDER — HYDRALAZINE HYDROCHLORIDE 20 MG/ML
5 INJECTION INTRAMUSCULAR; INTRAVENOUS EVERY 6 HOURS PRN
Status: DISCONTINUED | OUTPATIENT
Start: 2017-01-01 | End: 2017-01-01

## 2017-01-01 RX ORDER — PROPOFOL 10 MG/ML
5-75 INJECTION, EMULSION INTRAVENOUS CONTINUOUS
Status: DISCONTINUED | OUTPATIENT
Start: 2017-01-01 | End: 2017-01-01

## 2017-01-01 RX ORDER — DOPAMINE HYDROCHLORIDE 160 MG/100ML
INJECTION, SOLUTION INTRAVENOUS
Status: COMPLETED
Start: 2017-01-01 | End: 2017-01-01

## 2017-01-01 RX ORDER — GLYCOPYRROLATE 0.2 MG/ML
.2-.4 INJECTION, SOLUTION INTRAMUSCULAR; INTRAVENOUS EVERY 4 HOURS PRN
Status: DISCONTINUED | OUTPATIENT
Start: 2017-01-01 | End: 2017-01-01 | Stop reason: HOSPADM

## 2017-01-01 RX ORDER — METHYLPREDNISOLONE SODIUM SUCCINATE 125 MG/2ML
60 INJECTION, POWDER, LYOPHILIZED, FOR SOLUTION INTRAMUSCULAR; INTRAVENOUS EVERY 6 HOURS
Status: DISCONTINUED | OUTPATIENT
Start: 2017-01-01 | End: 2017-01-01

## 2017-01-01 RX ORDER — HYDROMORPHONE HYDROCHLORIDE 1 MG/ML
0.2 INJECTION, SOLUTION INTRAMUSCULAR; INTRAVENOUS; SUBCUTANEOUS
Status: DISCONTINUED | OUTPATIENT
Start: 2017-01-01 | End: 2017-01-01

## 2017-01-01 RX ORDER — POTASSIUM CL/LIDO/0.9 % NACL 10MEQ/0.1L
10 INTRAVENOUS SOLUTION, PIGGYBACK (ML) INTRAVENOUS
Status: DISCONTINUED | OUTPATIENT
Start: 2017-01-01 | End: 2017-01-01

## 2017-01-01 RX ORDER — AMOXICILLIN 250 MG
1 CAPSULE ORAL 2 TIMES DAILY PRN
Status: DISCONTINUED | OUTPATIENT
Start: 2017-01-01 | End: 2017-01-01 | Stop reason: HOSPADM

## 2017-01-01 RX ORDER — ALBUTEROL SULFATE 0.83 MG/ML
2.5 SOLUTION RESPIRATORY (INHALATION) EVERY 4 HOURS PRN
Status: DISCONTINUED | OUTPATIENT
Start: 2017-01-01 | End: 2017-01-01

## 2017-01-01 RX ORDER — BISACODYL 10 MG
10 SUPPOSITORY, RECTAL RECTAL DAILY PRN
Status: DISCONTINUED | OUTPATIENT
Start: 2017-01-01 | End: 2017-01-01

## 2017-01-01 RX ORDER — NALOXONE HYDROCHLORIDE 0.4 MG/ML
.1-.4 INJECTION, SOLUTION INTRAMUSCULAR; INTRAVENOUS; SUBCUTANEOUS
Status: DISCONTINUED | OUTPATIENT
Start: 2017-01-01 | End: 2017-01-01

## 2017-01-01 RX ORDER — HEPARIN SODIUM 5000 [USP'U]/.5ML
5000 INJECTION, SOLUTION INTRAVENOUS; SUBCUTANEOUS EVERY 8 HOURS
Status: DISCONTINUED | OUTPATIENT
Start: 2017-01-01 | End: 2017-01-01

## 2017-01-01 RX ORDER — ATROPINE SULFATE 10 MG/ML
1-2 SOLUTION/ DROPS OPHTHALMIC
Status: DISCONTINUED | OUTPATIENT
Start: 2017-01-01 | End: 2017-01-01 | Stop reason: HOSPADM

## 2017-01-01 RX ORDER — CHLORHEXIDINE GLUCONATE ORAL RINSE 1.2 MG/ML
15 SOLUTION DENTAL EVERY 12 HOURS
Status: DISCONTINUED | OUTPATIENT
Start: 2017-01-01 | End: 2017-01-01

## 2017-01-01 RX ORDER — LORAZEPAM 2 MG/ML
2 INJECTION INTRAMUSCULAR ONCE
Status: COMPLETED | OUTPATIENT
Start: 2017-01-01 | End: 2017-01-01

## 2017-01-01 RX ORDER — LANOLIN ALCOHOL/MO/W.PET/CERES
200 CREAM (GRAM) TOPICAL DAILY
Status: DISCONTINUED | OUTPATIENT
Start: 2017-01-01 | End: 2017-01-01

## 2017-01-01 RX ORDER — PROPOFOL 10 MG/ML
20 INJECTION, EMULSION INTRAVENOUS ONCE
Status: COMPLETED | OUTPATIENT
Start: 2017-01-01 | End: 2017-01-01

## 2017-01-01 RX ORDER — ACETAMINOPHEN 325 MG/1
650 TABLET ORAL EVERY 4 HOURS PRN
Status: DISCONTINUED | OUTPATIENT
Start: 2017-01-01 | End: 2017-01-01 | Stop reason: HOSPADM

## 2017-01-01 RX ORDER — LORAZEPAM 2 MG/ML
1-4 INJECTION INTRAMUSCULAR
Status: DISCONTINUED | OUTPATIENT
Start: 2017-01-01 | End: 2017-01-01

## 2017-01-01 RX ORDER — MORPHINE SULFATE 10 MG/5ML
5-10 SOLUTION ORAL
Status: DISCONTINUED | OUTPATIENT
Start: 2017-01-01 | End: 2017-01-01 | Stop reason: HOSPADM

## 2017-01-01 RX ORDER — LABETALOL HYDROCHLORIDE 5 MG/ML
20 INJECTION, SOLUTION INTRAVENOUS EVERY 6 HOURS PRN
Status: DISCONTINUED | OUTPATIENT
Start: 2017-01-01 | End: 2017-01-01

## 2017-01-01 RX ORDER — ONDANSETRON 4 MG/1
4 TABLET, ORALLY DISINTEGRATING ORAL EVERY 6 HOURS PRN
Status: DISCONTINUED | OUTPATIENT
Start: 2017-01-01 | End: 2017-01-01

## 2017-01-01 RX ORDER — AMOXICILLIN 250 MG
1 CAPSULE ORAL 2 TIMES DAILY PRN
Qty: 60 TABLET | DISCHARGE
Start: 2017-01-01

## 2017-01-01 RX ORDER — METOCLOPRAMIDE HYDROCHLORIDE 5 MG/ML
10 INJECTION INTRAMUSCULAR; INTRAVENOUS ONCE
Status: COMPLETED | OUTPATIENT
Start: 2017-01-01 | End: 2017-01-01

## 2017-01-01 RX ORDER — ATROPINE SULFATE 10 MG/ML
1-2 SOLUTION/ DROPS OPHTHALMIC
Qty: 1 BOTTLE | Refills: 0 | Status: SHIPPED | OUTPATIENT
Start: 2017-01-01

## 2017-01-01 RX ORDER — MORPHINE SULFATE 20 MG/ML
5-10 SOLUTION ORAL
Status: DISCONTINUED | OUTPATIENT
Start: 2017-01-01 | End: 2017-01-01 | Stop reason: HOSPADM

## 2017-01-01 RX ORDER — HALOPERIDOL 2 MG/ML
.5-1 SOLUTION ORAL EVERY 6 HOURS PRN
Qty: 120 ML | Refills: 0 | Status: SHIPPED | OUTPATIENT
Start: 2017-01-01

## 2017-01-01 RX ORDER — IOPAMIDOL 755 MG/ML
120 INJECTION, SOLUTION INTRAVASCULAR ONCE
Status: COMPLETED | OUTPATIENT
Start: 2017-01-01 | End: 2017-01-01

## 2017-01-01 RX ORDER — ETOMIDATE 2 MG/ML
20 INJECTION INTRAVENOUS ONCE
Status: COMPLETED | OUTPATIENT
Start: 2017-01-01 | End: 2017-01-01

## 2017-01-01 RX ORDER — AMOXICILLIN 250 MG
1 CAPSULE ORAL 2 TIMES DAILY
Status: DISCONTINUED | OUTPATIENT
Start: 2017-01-01 | End: 2017-01-01

## 2017-01-01 RX ORDER — ACETAMINOPHEN 325 MG/1
650 TABLET ORAL EVERY 4 HOURS PRN
Qty: 100 TABLET | DISCHARGE
Start: 2017-01-01

## 2017-01-01 RX ORDER — DEXTROSE MONOHYDRATE, SODIUM CHLORIDE, AND POTASSIUM CHLORIDE 50; 1.49; 4.5 G/1000ML; G/1000ML; G/1000ML
INJECTION, SOLUTION INTRAVENOUS CONTINUOUS
Status: DISCONTINUED | OUTPATIENT
Start: 2017-01-01 | End: 2017-01-01

## 2017-01-01 RX ORDER — DIAZEPAM 10 MG/2ML
10 INJECTION, SOLUTION INTRAMUSCULAR; INTRAVENOUS ONCE
Status: COMPLETED | OUTPATIENT
Start: 2017-01-01 | End: 2017-01-01

## 2017-01-01 RX ADMIN — HEPARIN SODIUM 5000 UNITS: 5000 INJECTION, SOLUTION INTRAVENOUS; SUBCUTANEOUS at 15:08

## 2017-01-01 RX ADMIN — SODIUM CHLORIDE: 9 INJECTION, SOLUTION INTRAVENOUS at 20:50

## 2017-01-01 RX ADMIN — ONDANSETRON 4 MG: 2 SOLUTION INTRAMUSCULAR; INTRAVENOUS at 11:21

## 2017-01-01 RX ADMIN — FOSPHENYTOIN SODIUM 100 MG PE: 50 INJECTION, SOLUTION INTRAMUSCULAR; INTRAVENOUS at 21:56

## 2017-01-01 RX ADMIN — EPINEPHRINE 0.1 MG: 0.1 INJECTION, SOLUTION ENDOTRACHEAL; INTRACARDIAC; INTRAVENOUS at 15:33

## 2017-01-01 RX ADMIN — Medication 2 G: at 05:28

## 2017-01-01 RX ADMIN — NYSTATIN 500000 UNITS: 100000 SUSPENSION ORAL at 19:03

## 2017-01-01 RX ADMIN — HEPARIN SODIUM 5000 UNITS: 5000 INJECTION, SOLUTION INTRAVENOUS; SUBCUTANEOUS at 13:37

## 2017-01-01 RX ADMIN — ALBUTEROL SULFATE 2.5 MG: 2.5 SOLUTION RESPIRATORY (INHALATION) at 00:33

## 2017-01-01 RX ADMIN — DEXTROSE AND SODIUM CHLORIDE: 5; 450 INJECTION, SOLUTION INTRAVENOUS at 04:48

## 2017-01-01 RX ADMIN — SULFUR HEXAFLUORIDE 2 ML: KIT at 09:00

## 2017-01-01 RX ADMIN — SODIUM CHLORIDE: 9 INJECTION, SOLUTION INTRAVENOUS at 18:15

## 2017-01-01 RX ADMIN — LIDOCAINE HYDROCHLORIDE: 20 JELLY TOPICAL at 13:18

## 2017-01-01 RX ADMIN — SODIUM CHLORIDE 1000 ML: 9 INJECTION, SOLUTION INTRAVENOUS at 11:31

## 2017-01-01 RX ADMIN — FOSPHENYTOIN SODIUM 100 MG PE: 50 INJECTION, SOLUTION INTRAMUSCULAR; INTRAVENOUS at 09:24

## 2017-01-01 RX ADMIN — ALBUTEROL SULFATE 2.5 MG: 2.5 SOLUTION RESPIRATORY (INHALATION) at 19:23

## 2017-01-01 RX ADMIN — ALBUTEROL SULFATE 2.5 MG: 2.5 SOLUTION RESPIRATORY (INHALATION) at 07:37

## 2017-01-01 RX ADMIN — THIAMINE HYDROCHLORIDE 250 MG: 100 INJECTION, SOLUTION INTRAMUSCULAR; INTRAVENOUS at 20:07

## 2017-01-01 RX ADMIN — PANTOPRAZOLE SODIUM 40 MG: 40 TABLET, DELAYED RELEASE ORAL at 09:01

## 2017-01-01 RX ADMIN — CHLORHEXIDINE GLUCONATE 15 ML: 1.2 RINSE ORAL at 09:01

## 2017-01-01 RX ADMIN — ALBUTEROL SULFATE 2.5 MG: 2.5 SOLUTION RESPIRATORY (INHALATION) at 08:30

## 2017-01-01 RX ADMIN — PROPOFOL 40 MCG/KG/MIN: 10 INJECTION, EMULSION INTRAVENOUS at 15:59

## 2017-01-01 RX ADMIN — PROPOFOL 20 MG: 10 INJECTION, EMULSION INTRAVENOUS at 11:57

## 2017-01-01 RX ADMIN — PROPOFOL 45 MCG/KG/MIN: 10 INJECTION, EMULSION INTRAVENOUS at 20:55

## 2017-01-01 RX ADMIN — ETOMIDATE 20 MG: 2 INJECTION, SOLUTION INTRAVENOUS at 11:25

## 2017-01-01 RX ADMIN — LIDOCAINE HYDROCHLORIDE 2 ML: 10 INJECTION, SOLUTION EPIDURAL; INFILTRATION; INTRACAUDAL; PERINEURAL at 16:10

## 2017-01-01 RX ADMIN — NYSTATIN 500000 UNITS: 100000 SUSPENSION ORAL at 13:10

## 2017-01-01 RX ADMIN — ALBUTEROL SULFATE 2.5 MG: 2.5 SOLUTION RESPIRATORY (INHALATION) at 12:07

## 2017-01-01 RX ADMIN — Medication 2 G: at 09:09

## 2017-01-01 RX ADMIN — LORAZEPAM 1 MG: 2 INJECTION INTRAMUSCULAR; INTRAVENOUS at 05:32

## 2017-01-01 RX ADMIN — FOSPHENYTOIN SODIUM 100 MG PE: 50 INJECTION, SOLUTION INTRAMUSCULAR; INTRAVENOUS at 22:44

## 2017-01-01 RX ADMIN — METHYLPREDNISOLONE SODIUM SUCCINATE 62.5 MG: 125 INJECTION, POWDER, FOR SOLUTION INTRAMUSCULAR; INTRAVENOUS at 06:45

## 2017-01-01 RX ADMIN — PANTOPRAZOLE SODIUM 40 MG: 40 INJECTION, POWDER, FOR SOLUTION INTRAVENOUS at 07:51

## 2017-01-01 RX ADMIN — THIAMINE HYDROCHLORIDE 250 MG: 100 INJECTION, SOLUTION INTRAMUSCULAR; INTRAVENOUS at 20:04

## 2017-01-01 RX ADMIN — HEPARIN SODIUM 5000 UNITS: 5000 INJECTION, SOLUTION INTRAVENOUS; SUBCUTANEOUS at 22:33

## 2017-01-01 RX ADMIN — LORAZEPAM 4 MG: 2 INJECTION INTRAMUSCULAR; INTRAVENOUS at 23:35

## 2017-01-01 RX ADMIN — THIAMINE HYDROCHLORIDE 250 MG: 100 INJECTION, SOLUTION INTRAMUSCULAR; INTRAVENOUS at 19:42

## 2017-01-01 RX ADMIN — METHYLPREDNISOLONE SODIUM SUCCINATE 62.5 MG: 125 INJECTION, POWDER, FOR SOLUTION INTRAMUSCULAR; INTRAVENOUS at 13:36

## 2017-01-01 RX ADMIN — CHLORHEXIDINE GLUCONATE 15 ML: 1.2 RINSE ORAL at 20:01

## 2017-01-01 RX ADMIN — POTASSIUM CHLORIDE 20 MEQ: 1.5 POWDER, FOR SOLUTION ORAL at 07:51

## 2017-01-01 RX ADMIN — CHLORHEXIDINE GLUCONATE 15 ML: 1.2 RINSE ORAL at 20:27

## 2017-01-01 RX ADMIN — POTASSIUM CHLORIDE, DEXTROSE MONOHYDRATE AND SODIUM CHLORIDE: 150; 5; 450 INJECTION, SOLUTION INTRAVENOUS at 16:26

## 2017-01-01 RX ADMIN — NYSTATIN 500000 UNITS: 100000 SUSPENSION ORAL at 09:58

## 2017-01-01 RX ADMIN — NICOTINE 1 PATCH: 21 PATCH, EXTENDED RELEASE TRANSDERMAL at 09:42

## 2017-01-01 RX ADMIN — NYSTATIN 500000 UNITS: 100000 SUSPENSION ORAL at 17:52

## 2017-01-01 RX ADMIN — NYSTATIN 500000 UNITS: 100000 SUSPENSION ORAL at 13:37

## 2017-01-01 RX ADMIN — ALBUTEROL SULFATE 2.5 MG: 2.5 SOLUTION RESPIRATORY (INHALATION) at 00:47

## 2017-01-01 RX ADMIN — ALBUTEROL SULFATE 2.5 MG: 2.5 SOLUTION RESPIRATORY (INHALATION) at 19:32

## 2017-01-01 RX ADMIN — PIPERACILLIN SODIUM,TAZOBACTAM SODIUM 3.38 G: 3; .375 INJECTION, POWDER, FOR SOLUTION INTRAVENOUS at 12:04

## 2017-01-01 RX ADMIN — PROPOFOL 40 MCG/KG/MIN: 10 INJECTION, EMULSION INTRAVENOUS at 17:46

## 2017-01-01 RX ADMIN — CHLORHEXIDINE GLUCONATE 15 ML: 1.2 RINSE ORAL at 08:33

## 2017-01-01 RX ADMIN — LORAZEPAM 4 MG: 2 INJECTION INTRAMUSCULAR; INTRAVENOUS at 17:12

## 2017-01-01 RX ADMIN — PROPOFOL 45 MCG/KG/MIN: 10 INJECTION, EMULSION INTRAVENOUS at 02:00

## 2017-01-01 RX ADMIN — CHLORHEXIDINE GLUCONATE 15 ML: 1.2 RINSE ORAL at 19:32

## 2017-01-01 RX ADMIN — POTASSIUM PHOSPHATE, MONOBASIC AND POTASSIUM PHOSPHATE, DIBASIC 10 MMOL: 224; 236 INJECTION, SOLUTION INTRAVENOUS at 09:54

## 2017-01-01 RX ADMIN — PROPOFOL 40 MCG/KG/MIN: 10 INJECTION, EMULSION INTRAVENOUS at 12:56

## 2017-01-01 RX ADMIN — ALBUTEROL SULFATE 2.5 MG: 2.5 SOLUTION RESPIRATORY (INHALATION) at 07:21

## 2017-01-01 RX ADMIN — LORAZEPAM 2 MG: 2 INJECTION INTRAMUSCULAR; INTRAVENOUS at 22:30

## 2017-01-01 RX ADMIN — PIPERACILLIN SODIUM,TAZOBACTAM SODIUM 3.38 G: 3; .375 INJECTION, POWDER, FOR SOLUTION INTRAVENOUS at 17:13

## 2017-01-01 RX ADMIN — ALBUTEROL SULFATE 2.5 MG: 2.5 SOLUTION RESPIRATORY (INHALATION) at 12:00

## 2017-01-01 RX ADMIN — PROPOFOL 40 MCG/KG/MIN: 10 INJECTION, EMULSION INTRAVENOUS at 12:03

## 2017-01-01 RX ADMIN — HEPARIN SODIUM 5000 UNITS: 5000 INJECTION, SOLUTION INTRAVENOUS; SUBCUTANEOUS at 22:44

## 2017-01-01 RX ADMIN — CHLORHEXIDINE GLUCONATE 15 ML: 1.2 RINSE ORAL at 20:04

## 2017-01-01 RX ADMIN — ALBUTEROL SULFATE 2.5 MG: 2.5 SOLUTION RESPIRATORY (INHALATION) at 00:25

## 2017-01-01 RX ADMIN — FENTANYL CITRATE 100 MCG: 50 INJECTION, SOLUTION INTRAMUSCULAR; INTRAVENOUS at 00:38

## 2017-01-01 RX ADMIN — DEXTROSE AND SODIUM CHLORIDE: 5; 450 INJECTION, SOLUTION INTRAVENOUS at 09:52

## 2017-01-01 RX ADMIN — PANTOPRAZOLE SODIUM 40 MG: 40 INJECTION, POWDER, FOR SOLUTION INTRAVENOUS at 09:21

## 2017-01-01 RX ADMIN — CHLORHEXIDINE GLUCONATE 15 ML: 1.2 RINSE ORAL at 07:45

## 2017-01-01 RX ADMIN — ALBUTEROL SULFATE 2.5 MG: 2.5 SOLUTION RESPIRATORY (INHALATION) at 20:10

## 2017-01-01 RX ADMIN — CHLORHEXIDINE GLUCONATE 15 ML: 1.2 RINSE ORAL at 07:51

## 2017-01-01 RX ADMIN — FENTANYL CITRATE 100 MCG: 50 INJECTION, SOLUTION INTRAMUSCULAR; INTRAVENOUS at 02:53

## 2017-01-01 RX ADMIN — POTASSIUM CHLORIDE, DEXTROSE MONOHYDRATE AND SODIUM CHLORIDE: 150; 5; 450 INJECTION, SOLUTION INTRAVENOUS at 04:14

## 2017-01-01 RX ADMIN — NICOTINE 1 PATCH: 21 PATCH, EXTENDED RELEASE TRANSDERMAL at 09:04

## 2017-01-01 RX ADMIN — HEPARIN SODIUM 5000 UNITS: 5000 INJECTION, SOLUTION INTRAVENOUS; SUBCUTANEOUS at 14:07

## 2017-01-01 RX ADMIN — POTASSIUM CHLORIDE 20 MEQ: 29.8 INJECTION, SOLUTION INTRAVENOUS at 07:31

## 2017-01-01 RX ADMIN — PIPERACILLIN SODIUM,TAZOBACTAM SODIUM 3.38 G: 3; .375 INJECTION, POWDER, FOR SOLUTION INTRAVENOUS at 04:28

## 2017-01-01 RX ADMIN — FOSPHENYTOIN SODIUM 100 MG PE: 50 INJECTION, SOLUTION INTRAMUSCULAR; INTRAVENOUS at 22:48

## 2017-01-01 RX ADMIN — SUCCINYLCHOLINE CHLORIDE 150 MG: 20 INJECTION, SOLUTION INTRAMUSCULAR; INTRAVENOUS at 11:24

## 2017-01-01 RX ADMIN — FOSPHENYTOIN SODIUM 100 MG PE: 50 INJECTION, SOLUTION INTRAMUSCULAR; INTRAVENOUS at 22:27

## 2017-01-01 RX ADMIN — SODIUM CHLORIDE 1000 MG PE: 9 INJECTION, SOLUTION INTRAVENOUS at 14:21

## 2017-01-01 RX ADMIN — LORAZEPAM 4 MG: 2 INJECTION INTRAMUSCULAR; INTRAVENOUS at 20:45

## 2017-01-01 RX ADMIN — THIAMINE HYDROCHLORIDE 250 MG: 100 INJECTION, SOLUTION INTRAMUSCULAR; INTRAVENOUS at 19:33

## 2017-01-01 RX ADMIN — SODIUM CHLORIDE: 9 INJECTION, SOLUTION INTRAVENOUS at 09:09

## 2017-01-01 RX ADMIN — DIAZEPAM 10 MG: 5 INJECTION, SOLUTION INTRAMUSCULAR; INTRAVENOUS at 19:40

## 2017-01-01 RX ADMIN — SODIUM CHLORIDE 500 ML: 9 INJECTION, SOLUTION INTRAVENOUS at 18:11

## 2017-01-01 RX ADMIN — HEPARIN SODIUM 5000 UNITS: 5000 INJECTION, SOLUTION INTRAVENOUS; SUBCUTANEOUS at 05:36

## 2017-01-01 RX ADMIN — LORAZEPAM 2 MG: 2 INJECTION INTRAMUSCULAR; INTRAVENOUS at 11:40

## 2017-01-01 RX ADMIN — ALBUTEROL SULFATE 2.5 MG: 2.5 SOLUTION RESPIRATORY (INHALATION) at 20:29

## 2017-01-01 RX ADMIN — PROPOFOL 10 MCG/KG/MIN: 10 INJECTION, EMULSION INTRAVENOUS at 12:00

## 2017-01-01 RX ADMIN — LORAZEPAM 1 MG: 2 INJECTION INTRAMUSCULAR; INTRAVENOUS at 02:58

## 2017-01-01 RX ADMIN — IOPAMIDOL 120 ML: 755 INJECTION, SOLUTION INTRAVENOUS at 11:47

## 2017-01-01 RX ADMIN — PIPERACILLIN SODIUM,TAZOBACTAM SODIUM 3.38 G: 3; .375 INJECTION, POWDER, FOR SOLUTION INTRAVENOUS at 11:36

## 2017-01-01 RX ADMIN — PIPERACILLIN SODIUM,TAZOBACTAM SODIUM 3.38 G: 3; .375 INJECTION, POWDER, FOR SOLUTION INTRAVENOUS at 22:33

## 2017-01-01 RX ADMIN — HEPARIN SODIUM 5000 UNITS: 5000 INJECTION, SOLUTION INTRAVENOUS; SUBCUTANEOUS at 21:47

## 2017-01-01 RX ADMIN — FENTANYL CITRATE 100 MCG: 50 INJECTION, SOLUTION INTRAMUSCULAR; INTRAVENOUS at 04:42

## 2017-01-01 RX ADMIN — FENTANYL CITRATE 100 MCG: 50 INJECTION, SOLUTION INTRAMUSCULAR; INTRAVENOUS at 01:35

## 2017-01-01 RX ADMIN — LORAZEPAM 2 MG: 2 INJECTION INTRAMUSCULAR; INTRAVENOUS at 15:21

## 2017-01-01 RX ADMIN — LORAZEPAM 2 MG: 2 INJECTION INTRAMUSCULAR; INTRAVENOUS at 06:54

## 2017-01-01 RX ADMIN — HEPARIN SODIUM 5000 UNITS: 5000 INJECTION, SOLUTION INTRAVENOUS; SUBCUTANEOUS at 06:42

## 2017-01-01 RX ADMIN — DEXTROSE AND SODIUM CHLORIDE: 5; 450 INJECTION, SOLUTION INTRAVENOUS at 20:01

## 2017-01-01 RX ADMIN — PROPOFOL 40 MCG/KG/MIN: 10 INJECTION, EMULSION INTRAVENOUS at 20:04

## 2017-01-01 RX ADMIN — METHYLPREDNISOLONE SODIUM SUCCINATE 62.5 MG: 125 INJECTION, POWDER, FOR SOLUTION INTRAMUSCULAR; INTRAVENOUS at 00:32

## 2017-01-01 RX ADMIN — DEXTROSE AND SODIUM CHLORIDE: 5; 450 INJECTION, SOLUTION INTRAVENOUS at 05:48

## 2017-01-01 RX ADMIN — HYDRALAZINE HYDROCHLORIDE 5 MG: 20 INJECTION INTRAMUSCULAR; INTRAVENOUS at 00:40

## 2017-01-01 RX ADMIN — PIPERACILLIN SODIUM,TAZOBACTAM SODIUM 3.38 G: 3; .375 INJECTION, POWDER, FOR SOLUTION INTRAVENOUS at 01:10

## 2017-01-01 RX ADMIN — SODIUM CHLORIDE 100 ML: 9 INJECTION, SOLUTION INTRAVENOUS at 11:47

## 2017-01-01 RX ADMIN — PROPOFOL 40 MCG/KG/MIN: 10 INJECTION, EMULSION INTRAVENOUS at 10:42

## 2017-01-01 RX ADMIN — PROPOFOL 40 MCG/KG/MIN: 10 INJECTION, EMULSION INTRAVENOUS at 05:30

## 2017-01-01 RX ADMIN — ALBUTEROL SULFATE 2.5 MG: 2.5 SOLUTION RESPIRATORY (INHALATION) at 19:22

## 2017-01-01 RX ADMIN — METOCLOPRAMIDE 10 MG: 5 INJECTION, SOLUTION INTRAMUSCULAR; INTRAVENOUS at 13:18

## 2017-01-01 RX ADMIN — FENTANYL CITRATE 100 MCG: 50 INJECTION, SOLUTION INTRAMUSCULAR; INTRAVENOUS at 13:22

## 2017-01-01 RX ADMIN — ALBUTEROL SULFATE 2.5 MG: 2.5 SOLUTION RESPIRATORY (INHALATION) at 13:04

## 2017-01-01 RX ADMIN — SODIUM CHLORIDE: 9 INJECTION, SOLUTION INTRAVENOUS at 10:04

## 2017-01-01 RX ADMIN — FOSPHENYTOIN SODIUM 100 MG PE: 50 INJECTION, SOLUTION INTRAMUSCULAR; INTRAVENOUS at 10:14

## 2017-01-01 RX ADMIN — HEPARIN SODIUM 5000 UNITS: 5000 INJECTION, SOLUTION INTRAVENOUS; SUBCUTANEOUS at 13:51

## 2017-01-01 RX ADMIN — LORAZEPAM 1 MG: 2 INJECTION INTRAMUSCULAR; INTRAVENOUS at 01:22

## 2017-01-01 RX ADMIN — DOPAMINE HYDROCHLORIDE IN DEXTROSE 2 MCG/KG/MIN: 1.6 INJECTION, SOLUTION INTRAVENOUS at 16:59

## 2017-01-01 RX ADMIN — ALBUTEROL SULFATE 2.5 MG: 2.5 SOLUTION RESPIRATORY (INHALATION) at 02:13

## 2017-01-01 RX ADMIN — FOSPHENYTOIN SODIUM 100 MG PE: 50 INJECTION, SOLUTION INTRAMUSCULAR; INTRAVENOUS at 10:04

## 2017-01-01 RX ADMIN — MIDAZOLAM HYDROCHLORIDE 2 MG: 1 INJECTION, SOLUTION INTRAMUSCULAR; INTRAVENOUS at 11:40

## 2017-01-01 RX ADMIN — PROPOFOL 45 MCG/KG/MIN: 10 INJECTION, EMULSION INTRAVENOUS at 08:35

## 2017-01-01 RX ADMIN — POTASSIUM CHLORIDE 40 MEQ: 1.5 POWDER, FOR SOLUTION ORAL at 05:35

## 2017-01-01 RX ADMIN — POTASSIUM CHLORIDE 20 MEQ: 1.5 POWDER, FOR SOLUTION ORAL at 06:59

## 2017-01-01 RX ADMIN — HEPARIN SODIUM 5000 UNITS: 5000 INJECTION, SOLUTION INTRAVENOUS; SUBCUTANEOUS at 06:44

## 2017-01-01 RX ADMIN — PIPERACILLIN SODIUM,TAZOBACTAM SODIUM 3.38 G: 3; .375 INJECTION, POWDER, FOR SOLUTION INTRAVENOUS at 11:42

## 2017-01-01 RX ADMIN — ALBUMIN (HUMAN) 25 G: 12.5 SOLUTION INTRAVENOUS at 18:11

## 2017-01-01 RX ADMIN — SODIUM CHLORIDE: 9 INJECTION, SOLUTION INTRAVENOUS at 04:27

## 2017-01-01 RX ADMIN — FENTANYL CITRATE 100 MCG: 50 INJECTION, SOLUTION INTRAMUSCULAR; INTRAVENOUS at 05:07

## 2017-01-01 RX ADMIN — NICOTINE 1 PATCH: 21 PATCH, EXTENDED RELEASE TRANSDERMAL at 11:24

## 2017-01-01 RX ADMIN — ALBUTEROL SULFATE 2.5 MG: 2.5 SOLUTION RESPIRATORY (INHALATION) at 15:18

## 2017-01-01 RX ADMIN — LORAZEPAM 4 MG: 2 INJECTION INTRAMUSCULAR at 11:25

## 2017-01-01 RX ADMIN — PIPERACILLIN SODIUM,TAZOBACTAM SODIUM 3.38 G: 3; .375 INJECTION, POWDER, FOR SOLUTION INTRAVENOUS at 23:00

## 2017-01-01 RX ADMIN — ALBUTEROL SULFATE 2.5 MG: 2.5 SOLUTION RESPIRATORY (INHALATION) at 08:07

## 2017-01-01 RX ADMIN — METHYLPREDNISOLONE SODIUM SUCCINATE 62.5 MG: 125 INJECTION, POWDER, FOR SOLUTION INTRAMUSCULAR; INTRAVENOUS at 18:19

## 2017-01-01 RX ADMIN — PIPERACILLIN SODIUM,TAZOBACTAM SODIUM 3.38 G: 3; .375 INJECTION, POWDER, FOR SOLUTION INTRAVENOUS at 17:06

## 2017-01-01 RX ADMIN — NYSTATIN 500000 UNITS: 100000 SUSPENSION ORAL at 09:04

## 2017-01-01 RX ADMIN — THIAMINE HYDROCHLORIDE 250 MG: 100 INJECTION, SOLUTION INTRAMUSCULAR; INTRAVENOUS at 20:31

## 2017-01-01 RX ADMIN — NYSTATIN 500000 UNITS: 100000 SUSPENSION ORAL at 13:15

## 2017-01-01 RX ADMIN — HEPARIN SODIUM 5000 UNITS: 5000 INJECTION, SOLUTION INTRAVENOUS; SUBCUTANEOUS at 21:33

## 2017-01-01 RX ADMIN — LORAZEPAM 4 MG: 2 INJECTION INTRAMUSCULAR; INTRAVENOUS at 11:25

## 2017-01-01 RX ADMIN — SODIUM CHLORIDE 500 ML: 9 INJECTION, SOLUTION INTRAVENOUS at 14:40

## 2017-01-01 RX ADMIN — HEPARIN SODIUM 5000 UNITS: 5000 INJECTION, SOLUTION INTRAVENOUS; SUBCUTANEOUS at 05:30

## 2017-01-01 RX ADMIN — BISACODYL 10 MG: 10 SUPPOSITORY RECTAL at 13:18

## 2017-01-01 RX ADMIN — PIPERACILLIN SODIUM,TAZOBACTAM SODIUM 3.38 G: 3; .375 INJECTION, POWDER, FOR SOLUTION INTRAVENOUS at 17:01

## 2017-01-01 RX ADMIN — ALBUTEROL SULFATE 2.5 MG: 2.5 SOLUTION RESPIRATORY (INHALATION) at 08:00

## 2017-01-01 RX ADMIN — NYSTATIN 500000 UNITS: 100000 SUSPENSION ORAL at 22:22

## 2017-01-01 RX ADMIN — POTASSIUM CHLORIDE 20 MEQ: 29.8 INJECTION, SOLUTION INTRAVENOUS at 10:12

## 2017-01-01 RX ADMIN — HEPARIN SODIUM 5000 UNITS: 5000 INJECTION, SOLUTION INTRAVENOUS; SUBCUTANEOUS at 22:31

## 2017-01-01 RX ADMIN — PIPERACILLIN SODIUM,TAZOBACTAM SODIUM 3.38 G: 3; .375 INJECTION, POWDER, FOR SOLUTION INTRAVENOUS at 05:27

## 2017-01-01 RX ADMIN — FOSPHENYTOIN SODIUM 100 MG PE: 50 INJECTION, SOLUTION INTRAMUSCULAR; INTRAVENOUS at 21:48

## 2017-01-01 RX ADMIN — ALBUTEROL SULFATE 2.5 MG: 2.5 SOLUTION RESPIRATORY (INHALATION) at 00:31

## 2017-01-01 RX ADMIN — ALBUTEROL SULFATE 2.5 MG: 2.5 SOLUTION RESPIRATORY (INHALATION) at 14:02

## 2017-01-01 RX ADMIN — PROPOFOL 40 MCG/KG/MIN: 10 INJECTION, EMULSION INTRAVENOUS at 18:07

## 2017-01-01 RX ADMIN — FENTANYL CITRATE 100 MCG: 50 INJECTION, SOLUTION INTRAMUSCULAR; INTRAVENOUS at 17:03

## 2017-01-01 RX ADMIN — POTASSIUM CHLORIDE, DEXTROSE MONOHYDRATE AND SODIUM CHLORIDE: 150; 5; 450 INJECTION, SOLUTION INTRAVENOUS at 20:32

## 2017-01-01 RX ADMIN — LORAZEPAM 2 MG: 2 INJECTION INTRAMUSCULAR at 11:40

## 2017-01-01 RX ADMIN — ACETAMINOPHEN 650 MG: 325 TABLET, FILM COATED ORAL at 22:22

## 2017-01-01 RX ADMIN — PIPERACILLIN SODIUM,TAZOBACTAM SODIUM 3.38 G: 3; .375 INJECTION, POWDER, FOR SOLUTION INTRAVENOUS at 11:27

## 2017-01-01 RX ADMIN — FOSPHENYTOIN SODIUM 100 MG PE: 50 INJECTION, SOLUTION INTRAMUSCULAR; INTRAVENOUS at 10:33

## 2017-01-01 RX ADMIN — FOSPHENYTOIN SODIUM 100 MG PE: 50 INJECTION, SOLUTION INTRAMUSCULAR; INTRAVENOUS at 09:53

## 2017-01-01 RX ADMIN — PIPERACILLIN SODIUM,TAZOBACTAM SODIUM 3.38 G: 3; .375 INJECTION, POWDER, FOR SOLUTION INTRAVENOUS at 05:31

## 2017-01-01 RX ADMIN — PROPOFOL 40 MCG/KG/MIN: 10 INJECTION, EMULSION INTRAVENOUS at 02:31

## 2017-01-01 RX ADMIN — HEPARIN SODIUM 5000 UNITS: 5000 INJECTION, SOLUTION INTRAVENOUS; SUBCUTANEOUS at 17:05

## 2017-01-01 RX ADMIN — SODIUM CHLORIDE 1000 ML: 9 INJECTION, SOLUTION INTRAVENOUS at 12:16

## 2017-01-01 RX ADMIN — HEPARIN SODIUM 5000 UNITS: 5000 INJECTION, SOLUTION INTRAVENOUS; SUBCUTANEOUS at 06:27

## 2017-01-01 RX ADMIN — ALBUTEROL SULFATE 2.5 MG: 2.5 SOLUTION RESPIRATORY (INHALATION) at 00:54

## 2017-01-01 RX ADMIN — HEPARIN SODIUM 5000 UNITS: 5000 INJECTION, SOLUTION INTRAVENOUS; SUBCUTANEOUS at 05:26

## 2017-01-01 RX ADMIN — ALBUTEROL SULFATE 2.5 MG: 2.5 SOLUTION RESPIRATORY (INHALATION) at 20:13

## 2017-01-01 RX ADMIN — HEPARIN SODIUM 5000 UNITS: 5000 INJECTION, SOLUTION INTRAVENOUS; SUBCUTANEOUS at 21:56

## 2017-01-01 ASSESSMENT — VISUAL ACUITY
OU: NOT TESTABLE
OU: NORMAL ACUITY
OU: NOT TESTABLE
OU: NORMAL ACUITY
OU: NOT TESTABLE
OU: NORMAL ACUITY
OU: NORMAL ACUITY
OU: NOT TESTABLE
OU: NORMAL ACUITY
OU: NORMAL ACUITY
OU: NOT TESTABLE
OU: NORMAL ACUITY
OU: NOT TESTABLE
OU: NORMAL ACUITY
OU: NOT TESTABLE
OU: NORMAL ACUITY
OU: NORMAL ACUITY
OU: NOT TESTABLE
OU: NOT TESTABLE
OU: NORMAL ACUITY
OU: NOT TESTABLE
OU: NORMAL ACUITY
OU: NOT TESTABLE
OU: NORMAL ACUITY
OU: NOT TESTABLE
OU: NORMAL ACUITY
OU: NOT TESTABLE

## 2017-01-01 ASSESSMENT — ACTIVITIES OF DAILY LIVING (ADL): PREVIOUS_RESPONSIBILITIES: MEAL PREP;HOUSEKEEPING;LAUNDRY;FINANCES

## 2017-05-26 PROBLEM — I63.9 STROKE (H): Status: ACTIVE | Noted: 2017-01-01

## 2017-05-26 PROBLEM — R41.82 ALTERED MENTAL STATUS, UNSPECIFIED ALTERED MENTAL STATUS TYPE: Status: ACTIVE | Noted: 2017-01-01

## 2017-05-26 NOTE — IP AVS SNAPSHOT
` `     [place] [address] [facility]: [phone]                 INTERAGENCY TRANSFER FORM - NOTES (H&P, Discharge Summary, Consults, Procedures, Therapies)   2017                    Hospital Admission Date: 2017  JAIR FERNÁNDEZ   : 1950  Sex: Male        Patient PCP Information     Provider PCP Type    None Doctor, MD General      History & Physicals     No notes of this type exist for this encounter.      Discharge Summaries     No notes of this type exist for this encounter.         Consult Notes      Consults by Gloria Bautista APRN CNP at 2017 12:45 PM     Author:  Gloria Bautista APRN CNP Service:  Palliative Author Type:  Nurse Practitioner    Filed:  2017 12:45 PM Date of Service:  2017 12:45 PM Creation Time:  2017 12:16 PM    Status:  Signed :  Gloria Bautista APRN CNP (Nurse Practitioner)         [facility]    Palliative Care Consultation     Jair Fernández  MRN# [id number]  Date of Admission:  2017  Date of Service (when I saw the patient):[AW1.1] 17[AW1.2]  Reason for consult: Consulted by Dr. 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 He was intubated in the field. He was noted to be hypotensive on admission, required pressor support. 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 He was also noted to have a large CVA with significant acute extension. He has lost function of the left side of his body. He is also noted to have recurrent seizures, requiring fosphenytoin. He stabilized from a respiratory standpoint and was able to be extubated on . We were consulted for goals of care, asked to  attend family care conference today.     Symptoms/Recommendations   -Transition to comfort measures only, stop tele, VS, and lab monitoring   -DNR/DNI   -Transfer to station 88  -For oral thrush, ordered nystatin 500,000 units swish every 6hrs  -For nicotine dependence, ordered nicotine 21mg patch  -Other comfort medications ordered, including PO/SL PRN morphine for pain/dspnea, ativan PRN anxiety, haldol PRN agitation, robinul/atropine PRN secretions, bowel regimen   - consult for hospice  -Will require facility placement    Support/Coping  -Pt is not , no children  -2 siblings, Reid and Nikkie, present today   -Pt is not Orthodoxy or spiritual     Decisional Support, Goals of Care, Counseling & Coordination  Decisional Capacity Intact?  -No  Health Care Directive on File?  -No  Code Status/Resuscitation Preferences?  -Changed to DNR/DNI subsequent to today's visit     Discussion  Visited pt this AM. He is alert and able to follow commands. He denies pain. He is nonverbal, but indicating that he wants his face washed. Family is present and we elected to step into a private room for a care conference.    Present at today's conference was pt's sister Nikkie, brother Reid, DANII Cardona, Dr. Brandt, and myself. I introduced the scope of our practice to family. Discussed our potential roles for symptom management, support/coping, and decisional support (aka goals of care).     Dr. Brandt was able to explain to family the extent of Marvin's health concerns this hospitalization, including his occluded right carotid artery (possibly as a result from past radiation therapy), his seizure activity, and his acute and chronic strokes. Dr. Brandt worries that Marvin has lost function of the left side of his body, and it is unclear if he also lost part of his language center as well. We worry that given his smoking history and occluded right ICA, future strokes are very likely. Future seizure activity is also likely. His long term  "prognosis is poor.    At this time, Marvin has no ability to demonstrate capacity to make his own complex medical decisions. In the absence of a health care directive, and per next of kin rule, sister Nikkie and brother Reid will serve as surrogate decision makers.     Family describes Marvin's health and social health before this admission, which was very poor. He was on disability (previously worked as an ). He has polysubstance abuse (alcohol, tobacco, marijuana, and likely other substances). He is extremely malnourished. He never went to the doctor for maintenance of his health. They have no idea the status of his head and neck cancer, as he never followed up after treatment.     Family feels Marvin's will to live was dwindling. He was a very private man at baseline. They worried that he was having cognitive decline, possibly from family history of alzheimer's vs polysubstance abuse. He had no stable living situation and really has nowhere to go after the hospital. They believe he has no fiances and was likely on MA at some point, unsure if he still is.     We talk about options moving forward. 1. Pursue rehabilitation at long term care. Chances are low that he will want or be able to participate in rehabilitation. He will likely become sick again, probably from respiratory issues, recurrent strokes or seizures. He will likely die soon, but there is an opportunity to come back to the hospital to \"tune up\" whatever medical issues are reversible. This option may cause emotional suffering. 2. Pursue placement with hospice, focus on comfort and quality of life for whatever amount of time is left.    Family discussed at length and essentially agreed that Marvin would be very unhappy and unlikely to be able to rehabilitate from here. They want to ensure that we are not giving up on Marvin with a comfort care approach. I provided many reframes for family, that Marvin's body has already started the journey of dying, we " stopped it by intervening this admission, but we can back off again and allow him to have a peaceful, comfortable death.    We talked briefly about comfort measures, including transferring to station 88 and giving medications to help with pain or difficulty breathing. We talk about nicotine replacement. We talk about placement with hospice and financial implications. Family is agreeable to a hospice consultation this AM.    I visited Marvin again this AM. He is comfortable and not in any distress. I shared with him that he is in the hospital because of a stroke. I told him we will keep him comfortable and find a nice place for him to live. He is very appreciative of this. He wants to eat and is happy about the prospect of ice cream.     Plan of care reviewed with bedside RADU Liriano, unit [facility], and Sofía Dao,  hospice liaison.     Thank you for involving us in the care of this patient and family. We will continue to follow. Please do not hesitate to contact me with questions or concerns or the on-call provider for our team if evening or weekend.    Evelia SEN, [facility]  Palliative Medicine   Pager [phone]    Attestation:  Total time on the floor involved in the patient's care: 75 minutes  Total time spent in counseling/care coordination: >50%    Chief Complaint   Unresponsive, hypotensive     History is obtained from the patient, staff, family and extensive chart review.     Past Medical History    I have reviewed this patient's medical history and updated it with pertinent information if needed.   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  S/p neck dissection     Social History   Living situation: Essentially homeless. Had been living in his parent's home, but that  recently sold this year. Recently was staying with his nephew Nikolai     Family system: Not , no children. 2 siblings, Nikkie and Reid. Father is in the 's home, has alzheimer's disease. Mother  many years ago.     Self-identified support system: Siblings    Employment/education: Was an , on disability     Activities/interests: Nothing, stayed home. Previously loved a dog, but the dog  and family believes he lost his will to live     Use of community resources: None     Sikhism affiliation: None    Involvement in alyce community: None     Impact of illness on patient: Pt has many serious medical issues and family believe he is not healthy or strong enough to rehabilitate from here     Family History   I have reviewed this patient's family history and updated it with pertinent information if needed.   No family history on file.    Allergies   No Known Allergies    Medications   Current Facility-Administered Medications Ordered in Epic   Medication Dose Route Frequency Last Rate Last Dose     morphine solution 5-10 mg  5-10 mg Oral Q2H PRN        Or     morphine sulfate HIGH CONCENTRATE (ROXANOL *CONCENTRATED*) 100 MG/5ML (HIGH CONC) solution 5-10 mg  5-10 mg Sublingual Q2H PRN         LORazepam (ATIVAN) injection 0.5-1 mg  0.5-1 mg Intravenous Q3H PRN        Or     LORazepam (ATIVAN) tablet 0.5-1 mg  0.5-1 mg Oral Q3H PRN        Or     LORazepam (ATIVAN) tablet 0.5-1 mg  0.5-1 mg Sublingual Q3H PRN         [START ON 2017] bisacodyl (DULCOLAX) Suppository 10 mg  10 mg Rectal Once PRN         atropine 1 % ophthalmic solution 1-2 drop  1-2 drop Sublingual Q1H PRN         glycopyrrolate (ROBINUL) injection 0.2-0.4 mg  0.2-0.4 mg Intravenous Q4H PRN         haloperidol (HALDOL) 2 MG/ML (HIGH CONC) solution 0.5-1 mg  0.5-1 mg Oral Q6H PRN         senna-docusate (SENOKOT-S;PERICOLACE) 8.6-50 MG per tablet 1 tablet  1 tablet Oral BID PRN         acetaminophen (TYLENOL) tablet 650 mg  650  mg Oral Q4H PRN        Or     acetaminophen (TYLENOL) Suppository 650 mg  650 mg Rectal Q4H PRN         nicotine Patch in Place   Transdermal Q8H         [START ON 6/2/2017] nicotine patch REMOVAL   Transdermal Daily         nicotine (NICODERM CQ) 21 MG/24HR 24 hr patch 1 patch  1 patch Transdermal Daily   1 patch at 06/01/17 1124     nystatin (MYCOSTATIN) suspension 500,000 Units  500,000 Units Oral 4x Daily         No current Epic-ordered outpatient prescriptions on file.       Review of Systems   The comprehensive review of systems is negative other than noted here and in the assessment/plan.    Palliative Symptom Review (0=no symptom/no concern, 1=mild, 2=moderate, 3=severe):  Pain: 0  Shortness of Breath: 0    Physical Exam   Temp: 98.3  F (36.8  C) Temp src: Oral BP: (!) 155/96  Heart Rate: 80 Resp: 13 SpO2: 95 % O2 Device: Aerosol mask Oxygen Delivery: 10 LPM  Vitals:    05/30/17 1400 05/31/17 0600 06/01/17 0400   Weight: 54.1 kg (119 lb 4.3 oz) 55.1 kg (121 lb 7.6 oz) 54.9 kg (121 lb 0.5 oz)     CONSTITUTIONAL: Chronically ill, unkempt, emaciated man seen lying in the ICU bed in NAD, alert, orientation unable to be assessed. He is calm and cooperative, pleasant. Family present.   HEENT: thrush on tongue   RESPIRATORY: NL respiratory effort on RA  NEUROLOGIC: Alert, no verbal response. Left facial droop. Strength left upper and lower extremity 0/5, right extremities 3/5    Data   Results for orders placed or performed during the hospital encounter of 05/26/17 (from the past 24 hour(s))   Basic metabolic panel (AM Draw)   Result Value Ref Range    Sodium 138 133 - 144 mmol/L    Potassium 4.5 3.4 - 5.3 mmol/L    Chloride 106 94 - 109 mmol/L    Carbon Dioxide 23 20 - 32 mmol/L    Anion Gap 9 3 - 14 mmol/L    Glucose 101 (H) 70 - 99 mg/dL    Urea Nitrogen 17 7 - 30 mg/dL    Creatinine 0.56 (L) 0.66 - 1.25 mg/dL    GFR Estimate >90  Non  GFR Calc   >60 mL/min/1.7m2    GFR Estimate If Black  >90   GFR Calc   >60 mL/min/1.7m2    Calcium 8.6 8.5 - 10.1 mg/dL   Magnesium (AM Draw)   Result Value Ref Range    Magnesium 2.5 (H) 1.6 - 2.3 mg/dL   Phosphorus (AM Draw)   Result Value Ref Range    Phosphorus 3.9 2.5 - 4.5 mg/dL   CBC (AM Draw)   Result Value Ref Range    WBC 8.9 4.0 - 11.0 10e9/L    RBC Count 4.10 (L) 4.4 - 5.9 10e12/L    Hemoglobin 12.6 (L) 13.3 - 17.7 g/dL    Hematocrit 36.6 (L) 40.0 - 53.0 %    MCV 89 78 - 100 fl    MCH 30.7 26.5 - 33.0 pg    MCHC 34.4 31.5 - 36.5 g/dL    RDW 15.1 (H) 10.0 - 15.0 %    Platelet Count 309 150 - 450 10e9/L   Glucose by meter   Result Value Ref Range    Glucose 98 70 - 99 mg/dL[AW1.1]                    Revision History        User Key Date/Time User Provider Type Action    > AW1.2 6/1/2017 12:45 PM Gloria Bautista APRN CNP Nurse Practitioner Sign     AW1.1 6/1/2017 12:16 PM Gloria Bautista APRN CNP Nurse Practitioner             Consults by Grupo Fernandez RN at 5/30/2017  2:10 PM     Author:  Grupo Fernandez RN Service:  Care Coordinator Author Type:      Filed:  5/30/2017  2:49 PM Date of Service:  5/30/2017  2:10 PM Creation Time:  5/30/2017  1:51 PM    Status:  Signed :  Grupo Fernandez RN ()         Care Coordinator (CC) received a call from Reid Fernández (pt's brother) who is very upset that care conference was cancelled today at 4PM.   CC explained to Reid that the Neurologist is not available at 4PM today.  Reid states that he's available[VW1.1] between[VW1.2] 9AM[VW1.1]-12 noon[VW1.2] on Thursday or Friday.  CC spoke with Nikkie who agreed to care conference at 9AM on Thursday.  Per Nikkie, pt used to live with their father who has severe dementia and who she just recently placed at [facility].  Their mom passed away in 2006.  Pt has two siblings (Nikkie and Reid) and has no spouse or children.  Nikkie is not aware if pt has a health care directive.  Nikkie states she's overwhelmed taking care of  dad and now Tarun  Palliattive aware of family conference on Thursday at 9AM.[VW1.1]  Awaiting for Dr Brandt to confirm if available on Thursday.[VW1.2]      Revision History        User Key Date/Time User Provider Type Action    > VW1.2 5/30/2017  2:49 PM Grupo Fernandez RN Case Manager Sign     VW1.1 5/30/2017  1:51 PM Grupo Fernandez RN Case Manager             Consults by Liudmila Cutler MD at 5/29/2017  9:13 AM     Author:  Liudmila Cutler MD Service:  Vascular Surgery Author Type:  Fellow    Filed:  5/29/2017  1:26 PM Date of Service:  5/29/2017  9:13 AM Creation Time:  5/29/2017  9:13 AM    Status:  Cosign Needed :  Liudmila Cutler MD (Fellow)    Cosign Required:  Yes         Consult Orders:    1. Vascular Surgery IP Consult: Patient to be seen: Routine - within 24 hours; Carotid occlusion; Consultant may enter orders: Yes [[id number]] ordered by Perlman, David Morris, MD at 05/29/17 0824                  Vascular Surgery Consultation Note     Patient:  Jair Fernández   Date of birth 1950, Medical record number [id number]  Date of Visit:  05/29/2017  Date of Admission: 5/26/2017  Consult Requester:Lance Brandt MD            Assessment and Recommendations:   ASSESSMENT:  66 year-old man with right ICA is occluded from its origin to the supraclinoid segment and right hemispheric stroke in currently on mechanical ventilation.    RECOMMENDATION:  No acute surgical intervention at this time due to whole segment occlusion of right ICA. The onset of symptom is more than 3 days ago, therefore reperfusion of the right ICA will not improve his function.    ________________________________________________________________    Consult Question: Right internal carotid occlusion  Admission Diagnosis: Recurrent seizures (H) [G40.909]  Left-sided weakness [M62.81]  Acute respiratory failure with hypoxia (H) [J96.01]  Cerebrovascular accident (CVA), unspecified mechanism (H) [I63.9]         History of Present  Illness:[YS1.1]   Mr. Jair Fernández is a 66 year-old man, who presented with altered mental status and left sided weakness 3 days ago at a gas station. The patient was taken to the ED, where he suffered a seizure. He was intubated and transferred to the ICU. His MRI showed an acute infarct in the the right meddle cerebral artery distribution. CT angiogram of the neck showed whole segment occlusion of the right internal carotid artery extending to the[YS1.2] supraclinoid segment. The patient is currently intubated in the ICU.[YS1.3]           Review of Systems:[YS1.1]   Unable to obtain due to intubation[YS1.2]         Past Medical History:   No past medical history on file.         Past Surgical History:   No past surgical history on file.         Family History:   No family history on file.         Social History:     Social History   Substance Use Topics     Smoking status: Not on file     Smokeless tobacco: Not on file     Alcohol use Not on file     History   Sexual Activity     Sexual activity: Not on file            Current Medications (antimicrobials listed in bold):       pantoprazole  40 mg Intravenous QAM AC     fosphenytoin (CEREBYX) intermittent infusion (maintenance)  100 mg PE Intravenous Q12H     piperacillin-tazobactam  3.375 g Intravenous Q6H     chlorhexidine  15 mL Mouth/Throat Q12H     sodium chloride (PF)  10 mL Intracatheter Q8H     heparin  5,000 Units Subcutaneous Q8H     thiamine  250 mg Intravenous Daily    Followed by     [START ON 5/31/2017] thiamine  200 mg Oral or Feeding Tube Daily     albuterol  2.5 mg Nebulization Q6H            Allergies:   No Known Allergies         Physical Exam:   Vitals were reviewed  Patient Vitals for the past 24 hrs:   BP Temp Temp src Heart Rate Resp SpO2   05/29/17 0800 119/67 98.2  F (36.8  C) Esophageal 89 20 99 %   05/29/17 0730 117/63 - - 82 19 91 %   05/29/17 0721 - - - - - 96 %   05/29/17 0700 117/65 - - 74 19 99 %   05/29/17 0600 100/54 - - 80 19 97  %   05/29/17 0500 118/70 - - 89 18 100 %   05/29/17 0400 107/60 98.2  F (36.8  C) Oral 80 20 99 %   05/29/17 0348 - - - - - 98 %   05/29/17 0300 92/54 - - 86 19 99 %   05/29/17 0200 112/56 - - 87 21 97 %   05/29/17 0100 120/63 - - 86 20 98 %   05/29/17 0000 109/59 98.4  F (36.9  C) Oral 85 14 95 %   05/28/17 2318 - - - - - 95 %   05/28/17 2300 112/63 - - 93 21 97 %   05/28/17 2200 126/65 - - 98 21 97 %   05/28/17 2100 108/65 - - 100 28 99 %   05/28/17 2000 109/59 98.8  F (37.1  C) Oral 98 22 96 %   05/28/17 1926 - - - - - 100 %   05/28/17 1900 101/57 - - 93 21 99 %   05/28/17 1830 106/60 - - 98 19 98 %   05/28/17 1800 112/67 - - 96 20 99 %   05/28/17 1730 121/69 - - 90 18 100 %   05/28/17 1700 128/74 - - 93 17 100 %   05/28/17 1630 132/73 - - 89 17 100 %   05/28/17 1600 105/61 - - 87 20 97 %   05/28/17 1535 - - - - - 99 %   05/28/17 1530 - - - 90 19 99 %   05/28/17 1500 - - - 93 16 100 %   05/28/17 1435 115/47 - - 100 - 99 %   05/28/17 1425 124/77 - - 106 - 99 %   05/28/17 1415 129/60 - - 111 18 99 %   05/28/17 1330 106/61 99  F (37.2  C) - 100 21 98 %   05/28/17 1300 138/75 99  F (37.2  C) - 114 23 99 %   05/28/17 1230 139/76 99  F (37.2  C) - 103 17 100 %   05/28/17 1207 - - - - - 100 %   05/28/17 1200 128/72 98.8  F (37.1  C) - 112 16 100 %   05/28/17 1130 122/68 98.8  F (37.1  C) - 105 16 100 %   05/28/17 1100 147/74 99  F (37.2  C) - 107 15 100 %   05/28/17 1030 131/66 98.8  F (37.1  C) - 105 16 100 %   05/28/17 1000 134/75 98.8  F (37.1  C) - 115 27 100 %   05/28/17 0930 144/76 98.6  F (37  C) - 105 17 100 %     Ranges for his vital signs:  Temp:  [98.2  F (36.8  C)-99  F (37.2  C)] 98.2  F (36.8  C)  Heart Rate:  [] 89  Resp:  [14-28] 20  BP: ()/(47-77) 119/67  FiO2 (%):  [40 %] 40 %  SpO2:  [91 %-100 %] 99 %    Physical Examination:  GENERAL:[YS1.1]  Intubated and sedated.[YS1.3]   NEUROLOGIC:[YS1.1]  Spontaneous movement of right upper and lower extremities, but not left side[YS1.3]          Laboratory Data:     Hematology Studies    Recent Labs   Lab Test  05/29/17   0435  05/28/17   0415  05/27/17   0555  05/26/17   1200   WBC  16.1*  18.0*  20.7*  12.2*   ANEU   --   16.3*   --   10.3*   AEOS   --   0.1   --   0.0   HGB  10.3*  11.6*  11.9*  11.8*   MCV  91  91  91  92   PLT  173  169  237  236       Metabolic Studies   Recent Labs   Lab Test  05/29/17 0435  05/28/17   0415  05/27/17   0555  05/26/17   1200   NA  140  135  135  137   POTASSIUM  3.2*  5.0  3.9  4.2   CHLORIDE  109  104  103  105   CO2  25  23  25  22   BUN  14  13  12  15   CR  0.83  0.87  0.83  1.20   GFRESTIMATED  >90  Non  GFR Calc    89  >90  Non  GFR Calc    61         Liudmila (Ken) MD [facility]  Vascular Surgery Fellow  [phone] (p)[YS1.1]       Revision History        User Key Date/Time User Provider Type Action    > YS1.3 5/29/2017  1:26 PM Liudmila Cutler MD Fellow Sign     YS1.2 5/29/2017 12:03 PM Liudmila Cutler MD Fellow      YS1.1 5/29/2017  9:13 AM Liudmila Cutler MD Fellow             Consults by Claudia Ashby APRN CNP at 5/26/2017  1:45 PM     Author:  Claudia Ashby APRN CNP Service:  ICU Author Type:  Nurse Practitioner    Filed:  5/26/2017  2:36 PM Date of Service:  5/26/2017  1:45 PM Creation Time:  5/26/2017  1:44 PM    Status:  Attested :  Claudia Ashby APRN CNP (Nurse Practitioner)    Cosigner:  Helio Vidales MD at 5/26/2017  7:22 PM        Attestation signed by Helio Vidales MD at 5/26/2017  7:22 PM        Physician Attestation   I, Helio Vidales, have reviewed and discussed with the advanced practice provider their history, physical and plan for Jair Fernández. I did not participate in a shared visit by interviewing or examining the patient and this should be billed as an advanced practice provider only visit.    Helio Vidales  Date of Service (when I saw the patient): I did not personally see this patient today.                                [facility]    History and Physical/ Consult  Critical Care Service     Date of Admission:  5/26/2017  Date of Service (when I saw the patient):[DS1.1] 05/26/17    Assessment & Plan[DS1.2]   Jair Fernández is a 66 year old male who presents with AMS.  EMS  Found pt unresponsive outside on the ground of a local [employer]..Brought to the ED  The patient was also hypotension with systolic pressures in the 70s.  Intubated there.  No medical history is available.  Pt admitted to ICU for further cares.    Neuro  1. AMS, question of etiology, eval for seizures  2. Left sided hemiparesis   3. Hx multiple strokes and chronic right carotid occlusion per MRI  Plan:  -- Neuro cares managed by Dr Brandt and neurocrit team  -- Propofol for sedation as needed until extubation  -- Reassess after intubation meds have metabolized[DS1.1]   -- Start thiamine, given unknown hx[DS1.3]  -- EEG in process[DS1.4]    CV  1. Hypotension with lactic acidosis,   Plan:  -- Most likely hypovolemic.  Keep MAP > 65, fluids as needed to achieve this  -- Recheck lactate at 16:00, watch for other s/s of infection    Resp:  1. Acute respiratory failure, per report, pt intubated 2nd to emesis and AMS in ED  Plan:  --[DS1.1] CXR ordered to check ETT placement now  --[DS1.4] AC ventilation[DS1.1] 1[DS1.4]6[DS1.5]*[DS1.4] 450 +5[DS1.5]  -- PST later today, and daily     GI/Nutrition  1. Malnutrition, raul/pro deficit  Plan:  -- NPO  -- PPI  -- RD consult, request recommendations for TF given evidence of malnutrition, will place tube of some variety after pt settled and start feeds asap    Renal  No known hx.  Creatinine 1.2 on admit  Plan:  -- Monitor  -- Check UA and urine tox    Endocrine  1. Hyperglycemia  Plan:  --  Insulin gtt per protocol if indicated  --  Keep BG  <180 for optimal healing    Heme:  1. Leukocytosis  Plan:  -- Monitor for other s/s infection    General cares:  DVT Prophylaxis: Pneumatic Compression Devices,  heparin sq  GI Prophylaxis: PPI  Restraints: Restraints for medical healing needed: YES  Family update by me today: No[DS1.1].  SW informed of lack of family/hx, will look into it.[DS1.6]  Current lines are required for patient management  Access:  PIV, will ask for PICC emergently as unable to get more access.[DS1.1]      Claudia Ashby    Time Spent on this Encounter[DS1.2]   Billing:  I spent 60 minutes bedside and on the inpatient unit today managing the critical care of Jair Fernández in relation to the issues listed in this note.[DS1.1]    Code Status[DS1.2]   Full Code[DS1.1]    Primary Care Physician   None Doctor    Chief Complaint[DS1.2]   AMS[DS1.1]    History of Present Illness[DS1.2]   Jair Fernández is a 66 year old male who presents with AMS.  EMS reported that the patient was found unresponsive outside on the ground of a local [employer] at approximately 0945. Per EMS, the patient has left sided deficits and left sided weakness. The patient was also hypotension with systolic pressures in the 70s.  He was brought to the ED intubated for airway protection, resuscitated with 2L NS, and assessed by CT head/MRI brain.  He was then transferred to the ICU for further cares[DS1.1]    Past Medical History[DS1.2]  .[DS1.1]   No past medical history on file.[DS1.7]    Past Surgical History[DS1.2]   No past surgical history on file.[DS1.7]    Prior to Admission Medications   None     Allergies   No Known Allergies    Social History[DS1.2]   None on file[DS1.1]    Family History[DS1.2]   No family history on file.[DS1.7]    Review of Systems[DS1.2]   Review of systems not obtained due to patient factors - intubation and sedation[DS1.1]    Physical Exam   Temp: 97.1  F (36.2  C) Temp src: Temporal Temp  Min: 97.1  F (36.2  C)  Max: 97.1  F (36.2  C) BP: 126/78 Pulse: 81 Heart Rate: 81 Resp: (!) 34 SpO2: 95 % O2 Device: None (Room air)[DS1.2]    Vital Signs with Ranges[DS1.1]  Temp:  [97.1  F (36.2  C)]  97.1  F (36.2  C)  Pulse:  [81] 81  Heart Rate:  [53-81] 81  Resp:  [9-34] 34  BP: (126-141)/(78-90) 126/78  SpO2:  [94 %-100 %] 95 %  119 lbs 6.4 oz[DS1.2]    Constitutional: sedate, slightly agitated, no commands  Eyes: Lids and lashes normal, pupils equal, round and reactive to light, sclera clear, conjunctiva normal.  ENT: Normocephalic, without obvious abnormality, atraumatic, temporal wasting  Respiratory: No increased work of breathing, good air exchange, clear to auscultation bilaterally, no crackles or wheezing.  Cardiovascular: Normal apical impulse, regular rate and rhythm, normal S1 and S2, and no murmur noted.  GI: normal bowel sounds, soft, non-distended, non-tender, no guarding  Genitourinary:  Normal external anatomy, orona, urine yellow and clear  Skin: very dry, nails unkept,   Musculoskeletal: No movement on left noted, no deformities  Neurologic: eyes not open to pain, right side moves very purposefully, no commands  Neuropsychiatric: ANN[DS1.1]    Data   Results for orders placed or performed during the hospital encounter of 05/26/17 (from the past 24 hour(s))   CT Head w/o Contrast    Narrative    CT OF THE HEAD WITHOUT CONTRAST 5/26/2017 11:41 AM     COMPARISON: None.    HISTORY: Stroke.    TECHNIQUE: 5 mm thick axial CT images of the head were acquired  without IV contrast material.    FINDINGS: There are small chronic infarcts at the anterolateral aspect  of the right frontal lobe and at the posterolateral aspect of the  right parietal lobe as well as within the lateral aspect of the right  temporooccipital junction. There is moderate diffuse cerebral volume  loss. There are subtle patchy areas of decreased density in the  cerebral white matter bilaterally that are consistent with sequela of  chronic small vessel ischemic disease.    The ventricles and basal cisterns are within normal limits in  configuration given the degree of cerebral volume loss.  There is no  midline shift. There are no  extra-axial fluid collections.    No intracranial hemorrhage, mass or recent infarct.    The visualized paranasal sinuses are well-aerated. There is no  mastoiditis. There are no fractures of the visualized bones.      Impression    IMPRESSION: Chronic small infarcts in the right cerebral hemisphere as  detailed above. Diffuse cerebral volume loss and cerebral white matter  changes consistent with chronic small vessel ischemic disease. No  evidence for acute intracranial pathology.      Radiation dose for this scan was reduced using automated exposure  control, adjustment of the mA and/or kV according to patient size, or  iterative reconstruction technique   CTA Angiogram Head Neck    Narrative    CT ANGIOGRAM OF THE HEAD AND NECK WITHOUT AND WITH CONTRAST  5/26/2017  11:52 AM     COMPARISON: None    HISTORY: Stroke    TECHNIQUE:  Precontrast localizing scans were followed by CT  angiography with an injection of 70 mL Isovue-370 nonionic intravenous  contrast material with scans through the head and neck.  Images were  transferred to a separate 3-D workstation where multiplanar  reformations and 3-D images were created.  Estimates of carotid  stenoses are made relative to the distal internal carotid artery  diameters except as noted.      FINDINGS:   Neck CTA: The common carotid arteries bilaterally are patent without  stenosis. There is age indeterminate complete occlusion of the right  internal carotid artery from the origin to the supraclinoid segment.  The left internal carotid artery is patent without stenosis. The  dominant left and tiny right vertebral arteries are patent without  stenosis.    Head CTA: The basilar, distal left internal carotid, bilateral  anterior cerebral, bilateral middle cerebral and bilateral posterior  cerebral arteries are patent and unremarkable. The anterior  communicating and right posterior communicating arteries are patent  and unremarkable.      Impression    IMPRESSION:  1.  Age-indeterminate complete occlusion of the right internal carotid  artery from the origin to the supraclinoid segment. Given chronic  appearing right cerebral hemisphere infarcts, there is likely  long-standing disease in the right internal carotid artery although  the time of occlusion cannot be determined.  2. Minimal calcified atherosclerotic plaque at the origin of the left  internal carotid artery that does not result in stenosis.  3. Otherwise, normal neck and head CTA.      Radiation dose for this scan was reduced using automated exposure  control, adjustment of the mA and/or kV according to patient size, or  iterative reconstruction technique   CT Head w Contrast    Narrative    CT BRAIN PERFUSION 5/26/2017 11:54 AM    COMPARISON: None    HISTORY: Stroke    TECHNIQUE: Time sequential axial CT images of the head were acquired  during the administration of intravenous contrast (50 mL Isovue-370).  CTA images of the [place] as well as color perfusion maps of  the brain were created from this time sequential axial source data.      Impression    IMPRESSION: There is delayed arrival of the contrast bolus to the  right anterior cerebral and right middle cerebral artery territories  consistent with the known right internal carotid artery occlusion.  This results in mildly decreased cerebral blood flow but increased  cerebral blood volume to the right KELIN and right MCA territories  suggesting vasodilatation of the right KELIN and right MCA territories  to accommodate for decreased cerebral blood flow. There are no  findings to suggest acute ischemia or infarct. Perfusion defects  corresponding to chronic right frontal and right parietal infarcts are  noted. No other perfusion abnormalities.    Radiation dose for this scan was reduced using automated exposure  control, adjustment of the mA and/or kV according to patient size, or  iterative reconstruction technique   Blood gas arterial and oxyhgb   Result  Value Ref Range    pH Arterial 7.34 (L) 7.35 - 7.45 pH    pCO2 Arterial 39 35 - 45 mm Hg    pO2 Arterial 151 (H) 80 - 105 mm Hg    Bicarbonate Arterial 21 21 - 28 mmol/L    FIO2 40%     Oxyhemoglobin Arterial 97 92 - 100 %    Base Deficit Art 3.9 mmol/L   Basic metabolic panel   Result Value Ref Range    Sodium 137 133 - 144 mmol/L    Potassium 4.2 3.4 - 5.3 mmol/L    Chloride 105 94 - 109 mmol/L    Carbon Dioxide 22 20 - 32 mmol/L    Anion Gap 10 3 - 14 mmol/L    Glucose 156 (H) 70 - 99 mg/dL    Urea Nitrogen 15 7 - 30 mg/dL    Creatinine 1.20 0.66 - 1.25 mg/dL    GFR Estimate 61 >60 mL/min/1.7m2    GFR Estimate If Black 74 >60 mL/min/1.7m2    Calcium 7.7 (L) 8.5 - 10.1 mg/dL   CBC with platelets differential   Result Value Ref Range    WBC 12.2 (H) 4.0 - 11.0 10e9/L    RBC Count 3.90 (L) 4.4 - 5.9 10e12/L    Hemoglobin 11.8 (L) 13.3 - 17.7 g/dL    Hematocrit 35.8 (L) 40.0 - 53.0 %    MCV 92 78 - 100 fl    MCH 30.3 26.5 - 33.0 pg    MCHC 33.0 31.5 - 36.5 g/dL    RDW 14.4 10.0 - 15.0 %    Platelet Count 236 150 - 450 10e9/L    Diff Method Automated Method     % Neutrophils 84.6 %    % Lymphocytes 10.8 %    % Monocytes 3.7 %    % Eosinophils 0.2 %    % Basophils 0.2 %    % Immature Granulocytes 0.5 %    Nucleated RBCs 0 0 /100    Absolute Neutrophil 10.3 (H) 1.6 - 8.3 10e9/L    Absolute Lymphocytes 1.3 0.8 - 5.3 10e9/L    Absolute Monocytes 0.5 0.0 - 1.3 10e9/L    Absolute Eosinophils 0.0 0.0 - 0.7 10e9/L    Absolute Basophils 0.0 0.0 - 0.2 10e9/L    Abs Immature Granulocytes 0.1 0 - 0.4 10e9/L    Absolute Nucleated RBC 0.0    INR   Result Value Ref Range    INR 1.10 0.86 - 1.14   Lactic acid whole blood   Result Value Ref Range    Lactic Acid 3.6 (H) 0.7 - 2.1 mmol/L   Partial thromboplastin time   Result Value Ref Range    PTT 28 22 - 37 sec   Blood culture   Result Value Ref Range    Specimen Description Blood Right Arm     Special Requests Aerobic and anaerobic bottles received     Culture Micro No growth after 1  hour     Micro Report Status Pending    MR Brain w/o Contrast    Narrative    MRI OF THE BRAIN WITHOUT CONTRAST 5/26/2017 12:51 PM     COMPARISON: Head CT same day.    HISTORY:  Evaluation right internal carotid artery occlusion.    TECHNIQUE:   Brain: Axial diffusion-weighted with ADC map, T2-weighted with fat  saturation, T1-weighted and turboFLAIR and coronal T1-weighted images  of the brain were obtained without intravenous contrast.     FINDINGS: There are recent, though not definitely acute, border zone  infarcts in the parasagittal aspect of the high right cerebral  hemisphere involving portions of the right frontal and right parietal  lobes. These are consistent with the patient's known right internal  carotid artery occlusion.    Chronic infarcts in the right frontal and right parietal lobes are  again noted.    There is moderate diffuse cerebral volume loss. There are numerous  scattered focal and patchy periventricular areas of abnormal T2 signal  hyperintensity in the cerebral white matter bilaterally that are  consistent with sequela of chronic small vessel ischemic disease. The  ventricles and basal cisterns are within normal limits in  configuration given the degree of cerebral volume loss.  There is no  midline shift.  There are no extra-axial fluid collections.  There is  no evidence for acute intracranial hemorrhage.    There is no sinusitis or mastoiditis.      Impression    IMPRESSION:    1. Probable recent although not definitely acute border zone infarcts  in the high right cerebral hemisphere.  2. No definite acute infarcts noted.  3. Diffuse cerebral volume loss and cerebral white matter changes  consistent with chronic small vessel ischemic disease.[DS1.2]                Revision History        User Key Date/Time User Provider Type Action    > [N/A] 5/26/2017  2:36 PM Claudia Ashby APRN CNP Nurse Practitioner Sign     [N/A] 5/26/2017  2:35 PM Claudia Ashby APRN CNP Nurse  Practitioner Sign     DS1.5 5/26/2017  2:34 PM Claudia Ashby APRN CNP Nurse Practitioner Sign     DS1.4 5/26/2017  2:31 PM Claudia Ashby APRN CNP Nurse Practitioner Sign     [N/A] 5/26/2017  2:30 PM Claudia Ashby APRN CNP Nurse Practitioner Sign     DS1.6 5/26/2017  2:30 PM AshbyClaudia olsen APRN CNP Nurse Practitioner Sign     DS1.3 5/26/2017  2:29 PM Claudia Ashby APRN CNP Nurse Practitioner      DS1.7 5/26/2017  2:22 PM Claudia Ashby APRN CNP Nurse Practitioner Sign     DS1.2 5/26/2017  1:45 PM Claudia Ashby APRN CNP Nurse Practitioner      DS1.1 5/26/2017  1:44 PM Claudia Ashby APRN CNP Nurse Practitioner             Consults by Xiao Fermin RD, LD at 5/26/2017  2:58 PM     Author:  Xiao Fermin RD, LD Service:  Nutrition Author Type:  Registered Dietitian    Filed:  5/26/2017  2:58 PM Date of Service:  5/26/2017  2:58 PM Creation Time:  5/26/2017  2:21 PM    Status:  Signed :  Xiao Fermin RD, LD (Registered Dietitian)     Consult Orders:    1. Nutrition Services Adult IP Consult [[id number]] ordered by Claudia Ashby APRN CNP at 05/26/17 1344                CLINICAL NUTRITION SERVICES  -  ASSESSMENT NOTE    Recommendations Ordered by Registered Dietitian (ESTELLE):   Recommend TF: Peptamen Intense VHP @ 40 mL/hr x 24 hours (960 mL) to provide 960 kcal, 89 g pro (1.6 g/kg), 75 g CHO, 3.8 g Fiber, 806 mL free H2O.   - 60 mL fluid flush q 4 hrs for tube patency     Total (TF + D5 + Prop) = 1534 kcal (28 kcal/kg), 89 g pro (1.6 g/kg)    Begin TF @ 15 ml/hr. After 12 hours with good tolerance and Phos >1.9, begin advancement 15 ml q 12 hours until goal is met.    Malnutrition: Based on physical assessment pt likely with severe malnutrition  In Context of:  Acute illness or injury  Chronic illness or disease     REASON FOR ASSESSMENT  Jair SERVANDO Fernández is a 66 year old male seen by Registered Dietitian for Provider Order - Registered Dietitian  "to Assess and Recommend TF.  Provider responsible for entering TF orders.   Verbal order received for RD to enter TF orders, tube not yet placed, likely gastric.     NUTRITION HISTORY  - No medical history available. Pt intubated, sedated unable to obtain nutrition history.     CURRENT NUTRITION ORDERS  Diet Order:     NPO     Current Intake/Tolerance:  NA    PHYSICAL FINDINGS  Observed  Pt covered up to shoulders with blanket - observed face, shoulders, chest areas. Bones pronounced in shoulder/chest  Muscle Wasting - facial, shoulders, clavicle, severe wasting  Subcutaneous fat loss - facial, chest area, severe wasting  Obtained from Chart/Interdisciplinary Team  Appears cachectic   FT to be placed - likely gastric    ANTHROPOMETRICS  Height:[AK1.1] 5' 5\"[AK1.2]  Weight:[AK1.1] 119 lbs 6.4 oz[AK1.2] (54.2 kg)[AK1.1]  Body mass index is 19.87 kg/(m^2).[AK1.2]  Weight Status:  Normal BMI  IBW: 56.8 kg  % IBW: 95%  Weight History:[AK1.1]   Wt Readings from Last 10 Encounters:   05/26/17 54.2 kg (119 lb 6.4 oz)[AK1.3]       LABS  Labs reviewed  K 4.2 (NL)  BGs 156, 196 - trending high    MEDICATIONS  Medications reviewed  D5 @ 100 ml/hr -> 120 g CHO, 408 kcal daily from dextrose   Propofol @ 3.3 ml/hr --> 166 kcal daily from lipid  Electrolyte replacement protocols ordered    Dosing Weight 54.2 kg - admit    ASSESSED NUTRITION NEEDS PER APPROVED PRACTICE GUIDELINES:  Estimated Energy Needs: 1989-1688 kcals (25-30 Kcal/Kg)  Justification: repletion  Estimated Protein Needs:  grams protein (1.5-2 g pro/Kg)  Justification: Repletion  Estimated Fluid Needs: 9220-1309 mL (1 mL/Kcal)  Justification: maintenance    MALNUTRITION:  % Weight Loss:  No weight history on file  % Intake:  Unable to evaluate  Subcutaneous Fat Loss:  Orbital region severe depletion and Upper arm region severe depletion  Muscle Loss:  Temporal region severe depletion, Clavicle bone region severe depletion and Acromion bone region severe " depletion  Fluid Retention:  None noted    Malnutrition Diagnosis: Severe malnutrition  In Context of:  Acute illness or injury  Chronic illness or disease    NUTRITION DIAGNOSIS:  Inadequate protein-energy intake related to inability for PO 2/2 intubation as evidenced by plan for TF as pt currently meeting ~40% est energy needs and 0% protein needs via D5 and Propofol administration.      NUTRITION INTERVENTIONS  Recommendations / Nutrition Prescription  Recommend TF: Peptamen Intense VHP @ 40 mL/hr x 24 hours (960 mL) to provide 960 kcal, 89 g pro (1.6 g/kg), 75 g CHO, 3.8 g Fiber, 806 mL free H2O.   - 60 mL fluid flush q 4 hrs for tube patency     Total (TF + D5 + Prop) = 1534 kcal (28 kcal/kg), 89 g pro (1.6 g/kg)    Begin TF @ 15 ml/hr. After 12 hours with good tolerance and Phos >1.9, begin advancement 15 ml q 12 hours until goal is met.     Implementation  Nutrition education: Not appropriate at this time due to patient condition  EN Composition, EN Schedule and Feeding Tube Flush: ordered in EPIC per Verbal order as above.     Nutrition Goals  Peptamen Intense VHP + Prop + D5 to meet % estimated needs in the next 48-60 hours.      MONITORING AND EVALUATION:  Progress towards goals will be monitored and evaluated per protocol and Practice Guidelines    Xiao Fermin RD, LD[AK1.1]               Revision History        User Key Date/Time User Provider Type Action    > AK1.3 5/26/2017  2:58 PM Xiao Fermin RD, LD Registered Dietitian Sign     AK1.2 5/26/2017  2:22 PM Xiao Fermin RD, LD Registered Dietitian      AK1.1 5/26/2017  2:21 PM Xiao Fermin RD, LD Registered Dietitian             Consults by Lance Brandt MD at 5/26/2017 11:31 AM     Author:  Lance Brandt MD Service:  Neurology Author Type:  Physician    Filed:  5/26/2017 12:37 PM Date of Service:  5/26/2017 11:31 AM Creation Time:  5/26/2017 11:28 AM    Status:  Signed :  Lance Brandt MD (Physician)            [facility][AZ1.1]    NeuroCritical Care Consultation Note[AZ1.2]     Jair Fernández MRN# [id number]   YOB: 1950 Age: 66 year old    Code Status:[AZ1.1]No Order[AZ1.3]   Date of Admission: 5/26/2017  Date of Consult:[AZ1.1] 05/26/2017[AZ1.3]    _________________________________[AZ1.1]   Primary Care Physician   None Doctor[AZ1.3]  ______________________________________________[AZ1.1]         Assessment & Plan[AZ1.3]   ______________________________________________[AZ1.1]  (G40.909) Recurrent seizures (H)  --received 4 mg of Ativan  --Will load with fosphenytoin  --MRI brain is negative, will get stat EEG  -------May be in status epilepticus.[AZ1.4]   (I63.9) Cerebrovascular accident (CVA), unspecified mechanism (H)  (primary encounter diagnosis)[AZ1.5]  --No acute findings  --Previous multiple strokes  ------poor IV tPA candidate   ---Recommend urine drug test  ---MRI brain[AZ1.2] - no stroke  --Right ICA occlusion is likely chronic, given chronic right sided strokes[AZ1.6]  (J96.01) Acute respiratory failure with hypoxia (H)[AZ1.5]  --Intubated for airway protection  --Management per medical intensivists.[AZ1.2]   #. DVT Prophylaxis  --Mechanical[AZ1.1] only[AZ1.2]  #. PT/OT/Speech  --Start  evaluations  #. Nutrition / GI Prophylaxis  --Per recommendations of speech therapy      #. Code Status:[AZ1.1] Full Code[AZ1.6]     TIME:   Neurocritical care time:[AZ1.1]  60[AZ1.2] minutes for evaluation and management of[AZ1.1] code stroke evaluation[AZ1.2]. Patient is critically ill[AZ1.1] and[AZ1.2] has a very high risk of deterioration  ----------------------------------------------------------------------------------  ----------------------------------------------------------------------------------  Reason for consult:[AZ1.1] Code stroke[AZ1.2]    Chief Complaint[AZ1.3]   ______________________________________________[AZ1.1]  Left sided weakness  History  "is obtained from the patient[AZ1.2]    History of Present Illness[AZ1.3]   ______________________________________________  Jair Fernández is a 66 year old male who presents with left sided weakness and decreased mental status about 9:30 while at gas station. Patient was normal prior to that. Nothing else is knows about this patient. He was not able to handle his secretions and was emergently intubated. Patient may have had a seizure prior to intubation and received 4 mg of Ativan.[AZ1.1]   MRI brain did not show any acute change.[AZ1.6]   Past Medical History[AZ1.3]    ______________________________________________  Unknown[AZ1.1]  Past Surgical History[AZ1.3]   ______________________________________________  Unknown[AZ1.1]  Prior to Admission Medications[AZ1.3]   ______________________________________________[AZ1.1]  None     Allergies   No Known Allergies    Social History[AZ1.3]   ______________________________________________    Unknown[AZ1.1]    Family History[AZ1.3]   ______________________________________________  Reviewed and not felt to be contributory.[AZ1.1]     Review of Systems[AZ1.3]   ______________________________________________  Review of systems is not obtainable due to patient factors - intubation[AZ1.1]      Physical Exam[AZ1.3]   ______________________________________________  Weight:[AZ1.1]119 lbs 6.4 oz[AZ1.3]; Height:[AZ1.1]5' 5\"  Temp: 97.1  F (36.2  C) Temp src: Temporal BP: 141/90 Pulse: 81 Heart Rate: 81 Resp: (!) 34 SpO2: 97 % O2 Device: None (Room air)[AZ1.3]    General Appearance:  No acute distress[AZ1.1]  Neuro:       Mental Status Exam:  Somnolent, slurred speech, not much speech. Mental status decreased       Cranial Nerves:  Pupils 3 mm,  reactive. Occulocephalis reflexes are present.  Corneal reflexes present. Face - left facial droop Gag/Cough reflex present. Other CN are untestable           Motor:  Left sided weakness       Reflexes:  Low DTR, toes equivocal       Sensory:  " Untestable                    Coordination:   Untestable       Gait:  Untestable[AZ1.2]  Neck: no nuchal rigidity, normal thyroid. No carotid bruits.    Cardiovascular: Regular rate and rhythm, no m/r/g  Lungs: Clear to auscultation  Abdomen: Soft, not tender, not distended  Extremities: No clubbing, no cyanosis, no edema[AZ1.1]    Data[AZ1.3]   ______________________________________________  All Data personally reviewed:       Labs:   CBC RESULTS:[AZ1.1]       Recent Labs  Lab 05/26/17  1200   WBC 12.2*   RBC 3.90*   HGB 11.8*   HCT 35.8*   [AZ1.3]     Basic Metabolic Panel:[AZ1.1]   No lab results found.[AZ1.3]  Liver panel:[AZ1.1]  No lab results found.[AZ1.3]  INR:[AZ1.1]No lab results found.[AZ1.3]   Lipid Profile:[AZ1.1]No lab results found.[AZ1.3]  Thyroid Panel:[AZ1.1]No lab results found.[AZ1.3]   Vitamin B12:[AZ1.1] No lab results found.[AZ1.3]   Vitamin D level:[AZ1.1] No lab results found.[AZ1.3]  A1C:[AZ1.1] No lab results found.[AZ1.3]  Troponin I:[AZ1.1] No lab results found.[AZ1.3]  Ammonia:[AZ1.1] No lab results found.[AZ1.3]  CK:[AZ1.1] No lab results found.[AZ1.3]     CRP inflammation:[AZ1.1] No lab results found.[AZ1.3]  ESR:[AZ1.1] No lab results found.[AZ1.3]    LAUREL:[AZ1.1] No lab results found.[AZ1.3]    ANCA:[AZ1.1] No lab results found.[AZ1.3]   Drug Screen:[AZ1.1] No lab results found.[AZ1.3]  Alcohol level:[AZ1.1]No lab results found.[AZ1.3]  UA Results:[AZ1.1]  No lab results found.[AZ1.3]  Most Recent 6 Bacteria Isolates From Any Culture (See EPIC Reports for Culture Details):[AZ1.1]No lab results found.[AZ1.3]     Cardiac US:[AZ1.1]   ---[AZ1.2]           Imaging:   All imaging studies were reviewed personally  CT head:[AZ1.1]   Chronic small infarcts in the right cerebral hemisphere as  detailed above. Diffuse cerebral volume loss and cerebral white matter  changes consistent with chronic small vessel ischemic disease. No  evidence for acute intracranial pathology[AZ1.7]  CTA  neck/head:[AZ1.1]  1. Age-indeterminate complete occlusion of the right internal carotid  artery from the origin to the supraclinoid segment. Given chronic  appearing right cerebral hemisphere infarcts, there is likely  long-standing disease in the right internal carotid artery although  the time of occlusion cannot be determined.  2. Minimal calcified atherosclerotic plaque at the origin of the left  internal carotid artery that does not result in stenosis.  3. Otherwise, normal neck and head CTA.    CTP:  There is delayed arrival of the contrast bolus to the  right anterior cerebral and right middle cerebral artery territories  consistent with the known right internal carotid artery occlusion.  This results in mildly decreased cerebral blood flow but increased  cerebral blood volume to the right KELIN and right MCA territories  suggesting vasodilatation of the right KELIN and right MCA territories  to accommodate for decreased cerebral blood flow. There are no  findings to suggest acute ischemia or infarct. Perfusion defects  corresponding to chronic right frontal and right parietal infarcts are  noted. No other perfusion abnormalities.[AZ1.7]  MRI brain:[AZ1.1]   No acute stroke[AZ1.6]           Revision History        User Key Date/Time User Provider Type Action    > AZ1.6 5/26/2017 12:37 PM Lance Brandt MD Physician Sign     AZ1.3 5/26/2017 12:21 PM Lance Brandt MD Physician      AZ1.7 5/26/2017 12:20 PM Lance Brandt MD Physician      AZ1.4 5/26/2017 11:44 AM Lance Brandt MD Physician      AZ1.5 5/26/2017 11:39 AM Lance Brandt MD Physician      AZ1.2 5/26/2017 11:35 AM Lance Brandt MD Physician      AZ1.1 5/26/2017 11:28 AM Lance Brandt MD Physician                      Progress Notes - Physician (Notes from 05/31/17 through 06/03/17)      Progress Notes by Nhung Lockhart at 6/3/2017 11:41 AM     Author:  Nhung Lockhart Service:  (none) Author Type:       Filed:  6/3/2017 12:18 PM Date of Service:  6/3/2017 11:41 AM Creation Time:  6/3/2017 11:41 AM    Status:  Signed :  Nhung Lockhart ()         Social Work Progress Note  Pt chart reviewed. Pt discussed in interdisciplinary rounds.     Intervention: Sw completed and faxed HE PCS form in for transportation. Sw completed PAS-R, and faxed it alongside d/c orders[NB1.1], and scripts[NB1.2] to [facility].[NB1.1] Pt to see hospice RN at 1300 to sign hospice consents.[NB1.2]     PAS-RR    D: Per DHS regulation, SW completed and submitted PAS-RR to [place] Board on Aging Direct Connect via the Senior LinkAge Line.  PAS-RR confirmation # is : [id number]  P: Further questions may be directed to Senior LinkAge Line at #[phone], option #4 for PAS-RR staff.    Team members notified: Bedside RN, CC & HUC    Plan:  Anticipated Discharge Placement:  [facility] (P: [phone], F: [phone])  Barriers: None identified at this time.   Follow-up plan: No plans to follow. Sw available should a need arise.      MIKIE Pena, [facility]  [phone][NB1.1]  1218[NB1.2]     Revision History        User Key Date/Time User Provider Type Action    > NB1.2 6/3/2017 12:18 PM Nhung Lockhart  Sign     NB1.1 6/3/2017 11:41 AM Nhung Lockhart              Progress Notes by Nhung Lockhart at 6/2/2017  2:23 PM     Author:  Nhung Lockhart Service:  (none) Author Type:      Filed:  6/2/2017  4:00 PM Date of Service:  6/2/2017  2:23 PM Creation Time:  6/2/2017  2:23 PM    Status:  Signed :  Nhung Lockhart ()         Social Work Progress Note  Pt chart reviewed. Pt discussed in interdisciplinary rounds.     Intervention: Sw received a voice message from [facility] stating that they currently do not have beds available. George spoke with Durga at [facility] and he explained that he  would like bank statements faxed to him alongside d/c MA LTC application. George contacted pt's brother Reid and updated him of the above. Reid stated that he is unsure of if pt has bank statements at home, but he plans to ask pt's nephew Nikolai who pt lived with to look around the house.   George also spoke with Eli at [facility] and she explained that they would like a Health Care Directive or a financial POA form completed in order to accept pt. Reid has agreed to be the decision maker for both health and financial.     Both documents were completed and notarized. Eli at [facility] informed sw that she would like a copy of both forms faxed to her. S[NB1.1]w faxed both forms, and confirmed the bed with Eli at [facility]. George arranged a stretcher transport with Irma at  for 1400 on 6/3/17. George spoke with pt brother Reid and was able to add his address to the said forms. Reid also stated that he is unable to meet with hospice tomorrow at 1300 but will come in tonight. George informed him that a copy of both forms will be left here for his records. George also spoke with Sofía ( Hospice RN) and she stated that a liaison will meet with pt tomorrow at 1300 to sign consents.[NB1.2]     Team members notified: Bedside RN,[NB1.1] &[NB1.2]CC    Plan:  Anticipated Discharge Placement: LTC with hospice.  Barriers: None identified at this time.   Follow-up plan: Sw to continue to follow.     MIKIE Pena, [facility]  [phone]  1[NB1.1]600[NB1.2]     Revision History        User Key Date/Time User Provider Type Action    > NB1.2 6/2/2017  4:00 PM Nhung Lockhart  Sign     NB1.1 6/2/2017  2:23 PM Nhung Lockhart              Progress Notes by Jane Cárdenas at 6/2/2017  2:45 PM     Author:  Jane Cárdenas Service:  Spiritual Health Author Type:      Filed:  6/2/2017  2:49 PM Date of Service:  6/2/2017  2:45 PM Creation Time:  6/2/2017  2:45 PM    Status:  Signed  :  Jane Cárdenas ()         SPIRITUAL HEALTH SERVICES  Progress Note  [facility]    On call  asked to come to room and fill out HCD and POA paperwork.  In talking with the patient it was clear that he wanted his brother Reid to be his health care agent and POA.  Patient was clear about both of these decisions and wanted to fill out the long form so that he could have some say in his end-of-life wishes.  I filled out the form as he stated and had a notary come to sign the forms. I did try to call his brother but did not get an answer.  THe SW on the unit will follow up and also give the brother a copy and send the original to HIM.   Plan: Patient reports that he has no further needs and declined further services of  at this time.     Jane Cárdenas  Chaplain Resident  Pager [phone]                        [MS1.1]          Revision History        User Key Date/Time User Provider Type Action    > MS1.1 6/2/2017  2:49 PM Jane Cárdenas Sign            Progress Notes by Foster Rosa MD at 6/2/2017 12:00 PM     Author:  Foster Rosa MD Service:  Hospitalist Author Type:  Physician    Filed:  6/2/2017 12:04 PM Date of Service:  6/2/2017 12:00 PM Creation Time:  6/2/2017 12:00 PM    Status:  Signed :  Foster Rosa MD (Physician)         [facility]    Hospitalist Progress Note    Date of Service (when I saw the patient):[TC1.1] 06/02/2017    Assessment & Plan[TC1.2]   Jair Fernández is a 66 year old male who was admitted on 5/26/2017 with a past medical history, suspected for chronic obstructive pulmonary disease, alcohol abuse, tobacco abuse, chronic right-sided internal carotid artery occlusion and history of multiple CVAs who was admitted to [facility] Intensivist Service on 05/26/2017 following intubation due to recent right cerebral infarct with noted left-sided hemiparesis and facial droop and ongoing seizure activity. The Hospitalist  Service has been consulted to assist with transfer of care and transition to comfort care.   1.  Comfort care:  In the setting of recently right-sided cerebral infarct and ongoing left-sided hemiplegia and facial droop with noted poor prognosis, poor rehabilitation potential and poor her long-term survival, patient and his family have elected to limit care to comfort care and transfer patient to hospice. Comfort care orders have been placed as well as medications.  Nonessential medications have all been discontinued. Code status- DNR/DNI. Hospice consult has already occurred and there is ongoing plan and work towards transitioning [facility] to [facility].   2.  Thrush:  Will continue with nystatin 500,000 units suspension 4 times daily.   3.  Tobacco dependence:  Will continue with Nicoderm patch.   4.  Fluids, electrolytes and nutrition:  The patient will be placed on a regular diet and able to eat for pleasure.  No further IV fluids are required and no further laboratory drawing.       CODE STATUS:  DNR/DNI.       DISPOSITION:  Currently working on placement to [facility], likely occurring in the next several days.[TC1.1]     Foster Rosa[TC1.2], MD  Pager:  [phone]  Text Page (7 am to 6 pm)[TC1.1]      Interval History[TC1.2]   No new issues overnight.     -Data reviewed today: I reviewed all new labs and imaging results over the last 24 hours.[TC1.1]   Physical Exam   Temp: 98.2  F (36.8  C) Temp src: Oral BP: 130/84  Heart Rate: 101 Resp: 16 SpO2: 96 % O2 Device: None (Room air)    Vitals:    05/30/17 1400 05/31/17 0600 06/01/17 0400   Weight: 54.1 kg (119 lb 4.3 oz) 55.1 kg (121 lb 7.6 oz) 54.9 kg (121 lb 0.5 oz)[TC1.2]     Vital Signs with Ranges[TC1.1]  Temp:  [98.2  F (36.8  C)] 98.2  F (36.8  C)  Heart Rate:  [101] 101  Resp:  [14-16] 16  BP: (130)/(84) 130/84  SpO2:  [96 %] 96 %  I/O last 3 completed shifts:  In: 100 [P.O.:100]  Out: 225  [Urine:225][TC1.2]    GENERAL:  Awake. Laying in bed.   EYES: EOMI  NECK:  Supple  CARDIAC: RRR s1s2  RESPIRATORY: Breathing unlabored.  Clear to ausculation, no wheezes, rhonchi, or rales.  GI:  Soft, nontender, nondistended, no rebound or guarding.  Active bowel sounds.  LYMPHATICS:  No cyanosis or edema.  Distal pulses palpable.  SKIN: Multiple tattoos.   NEURO: Left hemiplegia[TC1.1]      Medications        nicotine   Transdermal Q8H     nicotine   Transdermal Daily     nicotine  1 patch Transdermal Daily     nystatin  500,000 Units Oral 4x Daily       Data     Recent Labs  Lab 06/01/17  0425 05/31/17  0440 05/30/17  0445  05/28/17  0415  05/26/17  1730   WBC 8.9 13.8* 13.1*  < > 18.0*  < >  --    HGB 12.6* 11.8* 10.8*  < > 11.6*  < >  --    MCV 89 90 91  < > 91  < >  --     271 240  < > 169  < >  --     140 144  < > 135  < >  --    POTASSIUM 4.5 3.8 3.7  < > 5.0  < >  --    CHLORIDE 106 107 112*  < > 104  < >  --    CO2 23 20 25  < > 23  < >  --    BUN 17 10 10  < > 13  < >  --    CR 0.56* 0.61* 0.70  < > 0.87  < >  --    ANIONGAP 9 13 7  < > 8  < >  --    DARINEL 8.6 8.4* 8.6  < > 8.0*  < >  --    * 62* 104*  < > 125*  < >  --    ALBUMIN  --   --  2.1*  --  2.5*  < > 2.8*   PROTTOTAL  --   --  6.4*  --  6.3*  < > 6.4*   BILITOTAL  --   --  0.6  --  0.6  < > 0.4   ALKPHOS  --   --  209*  --  57  < > 54   ALT  --   --  25  --  18  < > 18   AST  --   --  56*  --  31  < > 22   TROPI  --   --   --   --   --   --  <0.015The 99th percentile for upper reference range is 0.045 ug/L.  Troponin values in the range of 0.045 - 0.120 ug/L may be associated with risks of adverse clinical events.   < > = values in this interval not displayed.    No results found for this or any previous visit (from the past 24 hour(s)).[TC1.2]     Revision History        User Key Date/Time User Provider Type Action    > TC1.2 6/2/2017 12:04 PM Foster Rosa MD Physician Sign     TC1.1 6/2/2017 12:00 PM Foster Rosa MD  Physician             Progress Notes by Gloria Bautista APRN CNP at 6/2/2017 11:06 AM     Author:  Gloria Bautista APRN CNP Service:  Palliative Author Type:  Nurse Practitioner    Filed:  6/2/2017 11:14 AM Date of Service:  6/2/2017 11:06 AM Creation Time:  6/2/2017 11:06 AM    Status:  Signed :  Gloria Bautista APRN CNP (Nurse Practitioner)         [facility]    Palliative Care Progress Note    Jair Fernández  MRN# [id number]  Date of Admission:  5/26/2017  Date of Service (when I saw the patient):[AW1.1] 06/02/2017    Assessment & Plan[AW1.2]   Jair Fernández is a 66 year old male with PMH significant for 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 He was intubated in the field. He was noted to be hypotensive on admission, required pressor support. 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 He was also noted to have a large CVA with significant acute extension. He has lost function of the left side of his body. He is also noted to have recurrent seizures, requiring fosphenytoin. He stabilized from a respiratory standpoint and was able to be extubated on 5/30. We were consulted for goals of care, he transitioned to comfort measures only with hospice disposition on 6/1.     Symptoms/Recommendations  -Continue comfort measures only  -For oral thrush, ordered nystatin 500,000 units swish every 6hrs  -For nicotine dependence, ordered nicotine 21mg patch  -Other comfort medications ordered, including PO/SL PRN morphine for pain/dspnea, ativan PRN anxiety, haldol PRN agitation, robinul/atropine PRN secretions, bowel regimen   - consult for hospice and placement     Support/Coping  -Pt is not , no children  -2 siblings, Reid and Nikkie, met with them yesterday   -Pt is  not Sabianism or spiritual     Decisional Support, Goals of Care, Counseling & Coordination  Decisional Capacity Intact?  -No  Health Care Directive on File?  -No  Code Status/Resuscitation Preferences?  -DNR/DNI     Discussion  Visited with Marvin this AM. He is happily watching TV. He is able to speak today. He is able to tell me he is in the hospital for a stroke. He is disengaged with poor eye contact. He is not able to talk to me further about his medical issues. I shared with him that the stroke has affected the left side of his body. I shared with him that even with aggressive rehabilitation, we are not sure he would ever be able to move the left side of his body again or be functionally independent again.     Based on this conversation, I can confirm that I do not believe Marvin has the ability to make complex medical decisions for himself.     I shared with Marvin that our focus is to keep him comfortable and find him a nursing facility to live. An MA application has been submitted. Marvin provides indication that he is content with this plan of care. He offers no further questions or concerns at this time.     Case reviewed with bedside RADU Dangelo and unit CC oDminique.     Thank you for involving us in the care of this patient and family. We will sign off at this time. Please do not hesitate to contact me with questions or concerns or the on-call provider for our team if evening or weekend.    Evelia SEN, [facility]  Palliative Medicine   Pager [phone]    Attestation:  Total time on the floor involved in the patient's care: 35 minutes  Total time spent in counseling/care coordination: >50%[AW1.1]    Interval History[AW1.2]   Transitioned to comfort measures yesterday, transferred to station 77. MA application submitted, LTC referrals sent.[AW1.1]     Medications   Current Facility-Administered Medications Ordered in Epic   Medication Dose Route Frequency Last Rate Last Dose     morphine solution 5-10 mg  5-10 mg Oral  Q2H PRN        Or     morphine sulfate HIGH CONCENTRATE (ROXANOL *CONCENTRATED*) 100 MG/5ML (HIGH CONC) solution 5-10 mg  5-10 mg Sublingual Q2H PRN         LORazepam (ATIVAN) injection 0.5-1 mg  0.5-1 mg Intravenous Q3H PRN        Or     LORazepam (ATIVAN) tablet 0.5-1 mg  0.5-1 mg Oral Q3H PRN        Or     LORazepam (ATIVAN) tablet 0.5-1 mg  0.5-1 mg Sublingual Q3H PRN         [START ON 6/4/2017] bisacodyl (DULCOLAX) Suppository 10 mg  10 mg Rectal Once PRN         atropine 1 % ophthalmic solution 1-2 drop  1-2 drop Sublingual Q1H PRN         glycopyrrolate (ROBINUL) injection 0.2-0.4 mg  0.2-0.4 mg Intravenous Q4H PRN         haloperidol (HALDOL) 2 MG/ML (HIGH CONC) solution 0.5-1 mg  0.5-1 mg Oral Q6H PRN         senna-docusate (SENOKOT-S;PERICOLACE) 8.6-50 MG per tablet 1 tablet  1 tablet Oral BID PRN         acetaminophen (TYLENOL) tablet 650 mg  650 mg Oral Q4H PRN   650 mg at 06/01/17 2222    Or     acetaminophen (TYLENOL) Suppository 650 mg  650 mg Rectal Q4H PRN         nicotine Patch in Place   Transdermal Q8H         nicotine patch REMOVAL   Transdermal Daily         nicotine (NICODERM CQ) 21 MG/24HR 24 hr patch 1 patch  1 patch Transdermal Daily   1 patch at 06/02/17 0904     nystatin (MYCOSTATIN) suspension 500,000 Units  500,000 Units Oral 4x Daily   500,000 Units at 06/02/17 0904     No current [facility]-ordered outpatient prescriptions on file.       Physical Exam   Temp: 98.2  F (36.8  C) Temp src: Oral BP: 130/84  Heart Rate: 101 Resp: 16 SpO2: 96 % O2 Device: None (Room air)    Vitals:    05/30/17 1400 05/31/17 0600 06/01/17 0400   Weight: 54.1 kg (119 lb 4.3 oz) 55.1 kg (121 lb 7.6 oz) 54.9 kg (121 lb 0.5 oz)[AW1.2]     CONSTITUTIONAL: Chronically ill, unkempt, emaciated man seen sitting up in bed watching TV in NAD, alert, oriented to name and location. He is calm and generally cooperative, yet disengaged.   HEENT: thrush on tongue   RESPIRATORY: NL respiratory effort on RA  NEUROLOGIC: Alert, able  to speak, but gives short one word answers. Left facial droop. Strength not assessed today[AW1.1]     Data   No results found for this or any previous visit (from the past 24 hour(s)).[AW1.2]       Revision History        User Key Date/Time User Provider Type Action    > AW1.2 6/2/2017 11:14 AM Gloria Bautista APRN CNP Nurse Practitioner Sign     AW1.1 6/2/2017 11:06 AM Gloria Bautista APRN CNP Nurse Practitioner             Progress Notes by Jane Cárdenas at 6/2/2017  9:42 AM     Author:  Jane Cárdenas Service:  Spiritual Health Author Type:      Filed:  6/2/2017  9:44 AM Date of Service:  6/2/2017  9:42 AM Creation Time:  6/2/2017  9:42 AM    Status:  Signed :  Jane Cárdenas ()         SPIRITUAL HEALTH SERVICES  Progress Note  [facility]    Patient had palliative care conference ( see Note) and from that note and speaking with staff at rounds today there does not appear to be a need for SH services at this time.  Plan: Of course SH is available if the needs change or family/patient requests.     Jane Cárdenas  Chaplain Resident  Pager [phone]                        [MS1.1]          Revision History        User Key Date/Time User Provider Type Action    > MS1.1 6/2/2017  9:44 AM Jane Cárdenas  Sign            Progress Notes by Nhung Lockhart at 6/1/2017  4:08 PM     Author:  Nhung Lockhart Service:  (none) Author Type:      Filed:  6/1/2017  4:22 PM Date of Service:  6/1/2017  4:08 PM Creation Time:  6/1/2017  4:08 PM    Status:  Signed :  Nhung Lockhart ()         Social Work Progress Note  Pt chart reviewed. Pt discussed in interdisciplinary rounds.     Intervention: Pt's Application for payment of Long-Term Care Services was completed by Karlos, and [place] into the county. Reid shared that he would like a referral sent to facilities within the [facility]. George[NB1.1] sent a referral to Sonia Cash of  [facility] and on [place].[NB1.2]     Team members notified: CC    Plan:  Anticipated Discharge Placement: SNF with hospice vs OLD   Barriers: bed availability.   Follow-up plan: Sw to continue to follow.[NB1.1]     Nhung Lockhart MIKIE, [facility]  [phone]  16[NB1.3]22[NB1.2]     Revision History        User Key Date/Time User Provider Type Action    > NB1.2 6/1/2017  4:22 PM Nhung Lockhart  Sign     NB1.3 6/1/2017  4:19 PM Nhung Lockhart       NB1.1 6/1/2017  4:08 PM Nhung Lockhart              Progress Notes by Deandra Salvador RN at 6/1/2017 12:46 PM     Author:  Deandra Salvador RN Service:  (none) Author Type:  Registered Nurse    Filed:  6/1/2017 12:48 PM Date of Service:  6/1/2017 12:46 PM Creation Time:  6/1/2017 12:46 PM    Status:  Signed :  Deandra Salvador RN (Registered Nurse)         Pt transitioned to comfort care following care conference with sister, brother, Palliative Service and Neurocritical Care Service present. Comfort care orders and transfer orders obtained. Report called to station 73 RN. Plan: Promote comfort and anticipate hospital discharge with palliative/hospice care.[LL1.1]      Revision History        User Key Date/Time User Provider Type Action    > LL1.1 6/1/2017 12:48 PM Deandra Salvador RN Registered Nurse Sign            Progress Notes by Nhung Lockhart at 6/1/2017 10:59 AM     Author:  Nhung Lockhart Service:  (none) Author Type:      Filed:  6/1/2017 12:36 PM Date of Service:  6/1/2017 10:59 AM Creation Time:  6/1/2017 10:59 AM    Status:  Addendum :  Nhung Lockhart ()         Social Work Progress Note  Pt chart reviewed. Pt discussed in interdisciplinary rounds.     Intervention: Per Evelia with Palliative, pt is now on comfort cares and would like a hospice meeting. Evelia also shared that family are leaning towards pt discharging to [facility] due to lack of funding for a LTC facility. George spoke with Sofía with  hospice and she is currently meeting with family. Pt has transfer orders to station 88. Sw to see family in the ICU if pt does not transfer immediately.     Team members notified: Bedside RN, CC, [facility],     Plan:  Anticipated Discharge Placement: [facility] ?  Barriers: None identified at this time.   Follow-up plan: Sw to continue to follow.     MIKIE Pena, [facility]  [phone]  1106[NB1.1]    ADDENDUM:   George spoke with  at [facility] ([facility]) and she shared that they are currently full.  does not anticipate having openings until next week.  could not give sw a day next week she would anticipate having availability. George faxed  a referral. Per Sofía's note ([facility] RN), family are interested in pt applying for MA if [facility] does not have availability.  George contacted Karlos (financial counselor) *[phone] and he shared that he is available to help family complete the Application for payment of Long-Term Care Services. Karlos requested that family contacts him directly to arrange a time to complete the application. George left voice messages for pt's siblings Nikkie and Reid.     MIKIE Pena, [facility]  [phone]  1236[NB1.2]     Revision History        User Key Date/Time User Provider Type Action    > NB1.2 6/1/2017 12:36 PM Nhung Lockhart  Addend     NB1.1 6/1/2017 11:06 AM Nhung Lockhart  Sign            Progress Notes by Mary Gruber RD, LD at 6/1/2017 12:23 PM     Author:  Mary Gruber RD, LD Service:  Nutrition Author Type:  Registered Dietitian    Filed:  6/1/2017 12:23 PM Date of Service:  6/1/2017 12:23 PM Creation Time:  6/1/2017 12:23 PM    Status:  Signed :  Mary Gruber RD, LD (Registered Dietitian)         BRIEF NUTRITION NOTE:    Note TF has been discontinued and pt made comfort care.  Will be available only prn.    Mary Gruber  ESTELLE, JED, [facility][MH1.1]     Revision History        User Key Date/Time User Provider Type Action    > MH1.1 6/1/2017 12:23 PM SharonMary velazquez, ESTELLE, LD Registered Dietitian Sign            Progress Notes by Sofía Dao RN at 6/1/2017 10:50 AM     Author:  Sofía Dao RN Service:  Hospice Author Type:  Registered Nurse    Filed:  6/1/2017 11:02 AM Date of Service:  6/1/2017 10:50 AM Creation Time:  6/1/2017 10:50 AM    Status:  Signed :  Sofía Dao RN (Registered Nurse)         [facility]: Met with patients brother Reid and his spouse Dulce along with pts sister Nikkie. We met outside pts room per request of the family. Reid shared that the comfort care option would be the best plan for his brother and that this is what his brother would choose for himself if he could make that decision. This was just an informational meeting today. Pt is eligible for the medicare hospice benefit. I explained the benefit periods, services available for support, medication and equipment coverage. At one point the family stated he had MA in 2007 but they are not sure what his current situation is. The pt is not able to go back to the previous living situation at his nephews and the family is looking to a facility. They are very interested in [facility] and would like the  to check into bed availability and to proceed from there. If there are no beds available at [facility] the pt/family will need help and guidance in the MA application or reapplication process and then pursuing a SNF bed with [facility] care. No consents signed. I left the hospice information sheet with pts brother Reid and my phone number for questions. Thank you for the referral. Sofía Dao RN, BSN  [facility] Admission nurse  [phone][HR1.1]     Revision History        User Key Date/Time User Provider Type Action    > HR1.1 6/1/2017 11:02 AM Sofía Dao, RN Registered Nurse Sign            Progress Notes by Bronson Vargas MD at  6/1/2017 10:47 AM     Author:  Bronson Vargas MD Service:  Medical ICU Author Type:  Physician    Filed:  6/1/2017 11:00 AM Date of Service:  6/1/2017 10:47 AM Creation Time:  6/1/2017 10:47 AM    Status:  Signed :  Bronson Vargas MD (Physician)         Intensivist service:    Patient's family met with palliative care and neurology this AM, plan for comfort-measures only with transfer to hospice.    On my visit patient is comfortable, breathing easily, with clear lungs.    Intensivist service will sign off.  Please re-consult as needed.[DW1.1]     Revision History        User Key Date/Time User Provider Type Action    > DW1.1 6/1/2017 11:00 AM Bronson Vargas MD Physician Sign            Progress Notes by Lance Brandt MD at 6/1/2017  7:52 AM     Author:  Lance Brandt MD Service:  Neuro ICU Author Type:  Physician    Filed:  6/1/2017  9:57 AM Date of Service:  6/1/2017  7:52 AM Creation Time:  6/1/2017  7:52 AM    Status:  Addendum :  Lance Brandt MD (Physician)         [facility]  [facility]  Neuro-ICU Progress Note       Admission Date: 5/26/2017  Date of Service:06/01/2017   Hospital Day: 7[AZ1.1]     ADDENDUM: 6/1/2017  9:55 AM  Long discussion with the family, sister and brother. Explained prognosis, probably poor long term survival and poor rehab potential  After long discussion, family decided to limit care to comfort care only and transfer patient to hospice. They understand that this will lead to natural death  Patient will be DNR/DNI    Additional neurocritical care time - 40 minutes for withdrawal of care.   ----------------------------------------------------  Lance Brandt MD, PhD, [facility][AZ1.2]      _________________________________     Main Plans for Today   --Continue supportive care  --meeting with family today  Assessment & Plan   #. (I63.9) Cerebrovascular accident (CVA), unspecified mechanism (H)  (primary  "encounter diagnosis)  --Significant acute extension of right sided stroke on MRI  --Previous multiple strokes  --Right ICA occlusion is likely chronic, given chronic right sided strokes  --no improvements  #. (G40.059) Recurrent seizures (H)  --received 4 mg of Ativan  --loaded with fosphenytoin  --EEG showed right attenuation, some left frontotemporal sharply contoured activity, but otherwise was unremarkable with no seizures  --phenytoin level 12.3 on - continue scheduled fosphenytoin 100 mg BID.  #. (M62.81) Left-sided weakness  --See above  #. (J96.00) Acute respiratory failure, unspecified whether with hypoxia or hypercapnia (HCC)  --extubated on   #. Cardiovascular  --No acute issues  #. Infection   --Normal WBC  --Management per medical intensivists.   #. Endocrine:   --Insulin protocol to keep blood sugars 100-150.  #. Heme/Onc:   --mild anemia, stable  #. Renal  --Sodium goal 140-155  #. Skin/Musculoskeletal:  --No issues  #. PT/OT/Speech  --continue evaluations   #. Nutrition  --Per speech therapy evaluation   #. ICU prophylaxis:   --Stress ulcer prophylaxis: Protonix  --VAP: per protocol  --DVT prophylaxis: mechanical + heparin      CODE STATUS: FULL        Interval History   Slightly more awake.  No movement on the left. Following commands on the right. Nodding/shaking head to communicate. Denies pain. Extubated on  evening. Off BiPAP. Was more hypertensive overnight. Meeting with family and palliative this morning (was cancelled yesterday).    Physical Exam     Vitals:[AZ1.1]  Vitals:    17 1129 17 1400 17 0600 17 0400   Weight: 54.2 kg (119 lb 6.4 oz) 54.1 kg (119 lb 4.3 oz) 55.1 kg (121 lb 7.6 oz) 54.9 kg (121 lb 0.5 oz)     Temp: 98.1  F (36.7  C) Temp src: Axillary BP: (!) 155/96  Heart Rate: 80 Resp: 13 SpO2: 96 % O2 Device: BiPAP/CPAP Oxygen Delivery: 3 LPM Height: 165.1 cm (5' 5\") Weight: 54.9 kg (121 lb 0.5 oz)[AZ1.3]        Tmax: Temp (24hrs), Av.2  F " (36.8  C), Min:97.9  F (36.6  C), Max:98.5  F (36.9  C)[AZ1.1]    BP - Mean:  [] 117[AZ1.3]   I&O:    Intake/Output Summary (Last 24 hours) at 06/01/17 0753  Last data filed at 06/01/17 0600   Gross per 24 hour   Intake             2300 ml   Output             2525 ml   Net             -225 ml    I/O since admission: 3990.28  Bowel movement: --         General Appearance:   No acute distress  Neuro:       Mental Status Exam:  Off sedation, off BiPAP, more awake, not alert.  Mute. Eyes open, not tracking. Nods/shakes head to communicate. Follows commands on the right.  Mental status is decreased        Cranial Nerves:  Pupils 3 mm, reactive. Occulocephalis reflexes are present.  Corneal reflexes present. Face symmetric. Gag/Cough reflex present.Other CN are untestable           Motor:  Left sided plegia, good strength on the right       Reflexes:  Low DTR, toes equivocal       Sensory:  Untestable                    Coordination:   Untestable       Gait:  Untestable  Cardiovascular: Regular rate and rhythm, no m/r/g  Lungs: FiO2 (%): 30 %  Resp: 13  Both hemithoraces are symmetrical, auscultation of lungs revealed no bronchovesicular sounds, expirium prolongation, wheezing, rhonci and crackles  Abdomen: Soft, not tender, not distended  Skin/Extremities: No clubbing, cyanosis, no edema     Medications   Infusions medications:    NaCl 100 mL/hr at 06/01/17 0427     propofol (DIPRIVAN) infusion Stopped (05/30/17 1504)     - MEDICATION INSTRUCTIONS -       IV fluid REPLACEMENT ONLY       Schedule medications:    methylPREDNISolone  62.5 mg Intravenous Q6H     fosphenytoin (CEREBYX) intermittent infusion (maintenance)  100 mg PE Intravenous Q12H     sodium chloride (PF)  10 mL Intracatheter Q8H     heparin  5,000 Units Subcutaneous Q8H     thiamine  200 mg Oral or Feeding Tube Daily     albuterol  2.5 mg Nebulization Q6H     PRN medications:labetalol, albuterol, fentaNYL, polyethylene glycol, bisacodyl, LORazepam,  potassium chloride, potassium chloride, potassium chloride, potassium chloride with lidocaine, potassium chloride, magnesium sulfate, magnesium sulfate, potassium phosphate (KPHOS) in D5W IV, potassium phosphate (KPHOS) in D5W IV, potassium phosphate (KPHOS) in D5W IV, potassium phosphate (KPHOS) in D5W IV, potassium phosphate (KPHOS) in D5W IV, ondansetron **OR** ondansetron, senna-docusate, sodium phosphate, naloxone, - MEDICATION INSTRUCTIONS -, lidocaine 4%, heparin lock flush, sodium chloride (PF), IV fluid REPLACEMENT ONLY  Data       Labotary Data:   All data was reviewed by me personally  CBC RESULTS:  Recent Labs   Lab Test  06/01/17 0425 05/31/17 0440 05/30/17 0445 05/29/17 0435 05/28/17   0415  05/27/17   0555   WBC  8.9  13.8*  13.1*  16.1*  18.0*  20.7*   RBC  4.10*  3.87*  3.54*  3.43*  3.81*  3.93*   HGB  12.6*  11.8*  10.8*  10.3*  11.6*  11.9*   HCT  36.6*  34.7*  32.2*  31.2*  34.7*  35.7*   PLT  309  271  240  173  169  237     Basic Metabolic Panel:  Recent Labs   Lab Test  06/01/17 0425 05/31/17 0440 05/30/17 0445 05/29/17 0435 05/28/17 0415 05/27/17   0555   NA  138  140  144  140  135  135   POTASSIUM  4.5  3.8  3.7  3.2*  5.0  3.9   CHLORIDE  106  107  112*  109  104  103   CO2  23  20  25  25  23  25   BUN  17  10  10  14  13  12   CR  0.56*  0.61*  0.70  0.83  0.87  0.83   GLC  101*  62*  104*  108*  125*  163*   DARINEL  8.6  8.4*  8.6  8.2*  8.0*  8.3*     ABG:  Recent Labs   Lab Test  05/28/17   0612  05/27/17   0529  05/26/17   1155   PH  7.40  7.44  7.34*   PCO2  38  32*  39   PO2  109*  111*  151*   HCO3  23  22  21   O2PER   --    --   40%     Liver panel:  Recent Labs   Lab Test  05/30/17   0445  05/28/17   0415  05/27/17   0555  05/26/17   1730   PROTTOTAL  6.4*  6.3*  6.7*  6.4*   ALBUMIN  2.1*  2.5*  3.2*  2.8*   BILITOTAL  0.6  0.6  0.5  0.4   ALKPHOS  209*  57  52  54   AST  56*  31  25  22   ALT  25  18  17  18     Procalcitonin:   Recent Labs   Lab Test   05/26/17   1730   PCAL  <0.05  <0.05 ng/ml  Normal  Recommendation: Very low risk of bacterial infection.   Discourage antibiotics unless strong clinical suspicion for serious infection.       Coagulation  Recent Labs   Lab Test  05/26/17   1200   INR  1.10   PTT  28      Lipid Profile:No lab results found.  Thyroid Panel:No lab results found.   Vitamin B12:   Recent Labs   Lab Test  05/28/17   1700   B12  312      Vitamin D:  Recent Labs   Lab Test  05/28/17   1700   VITDT  11*     A1C: No lab results found.  Troponin I:   Recent Labs   Lab Test  05/26/17   1730   TROPI  <0.015  The 99th percentile for upper reference range is 0.045 ug/L.  Troponin values in   the range of 0.045 - 0.120 ug/L may be associated with risks of adverse   clinical events.       Ammonia:   Recent Labs   Lab Test  05/28/17   0415   MOOK  24     Most Recent 6 Bacteria Isolates From Any Culture (See EPIC Reports for Culture Details):  Recent Labs   Lab Test  05/26/17   1730  05/26/17   1200   CULT  No growth  No growth     UA Results:  Recent Labs   Lab Test  05/26/17   1515   COLOR  Light Yellow   APPEARANCE  Clear   URINEGLC  70*   URINEBILI  Negative   URINEKETONE  Negative   SG  1.015   UBLD  Negative   URINEPH  7.0   PROTEIN  Negative   NITRITE  Negative   LEUKEST  Negative   RBCU  0   WBCU  <1          Cardiac US:   The visual ejection fraction is estimated at 60-65%. No regional wall motion abnormalities noted. The study was technically difficult. Limited views were obtained.       Neurophysiology:   5/26/17:  Encephalopathic EEG with significant asymmetry between hemispheres.        Imaging:   All imaging studies were reviewed personally  CT head 5/26/17:   1. Probable recent although not definitely acute border zone infarcts in the high right cerebral hemisphere.  2. No definite acute infarcts noted.  3. Diffuse cerebral volume loss and cerebral white matter changes consistent with chronic small vessel ischemic disease.    CT  perfusion 5/26/17:   --There is delayed arrival of the contrast bolus to the right anterior cerebral and right middle cerebral artery territories consistent with the known right internal carotid artery occlusion. This results in mildly decreased cerebral blood flow but increased cerebral blood volume to the right KELIN and right MCA territories suggesting vasodilatation of the right KELIN and right MCA territories to accommodate for decreased cerebral blood flow. There are no findings to suggest acute ischemia or infarct. Perfusion defects corresponding to chronic right frontal and right parietal infarcts are noted. No other perfusion abnormalities.    CT angiography neck/head 5/26/17:   1. Age-indeterminate complete occlusion of the right internal carotid artery from the origin to the supraclinoid segment. Given chronic appearing right cerebral hemisphere infarcts, there is likely long-standing disease in the right internal carotid artery although the time of occlusion cannot be determined.  2. Minimal calcified atherosclerotic plaque at the origin of the left internal carotid artery that does not result in stenosis.  3. Otherwise, normal neck and head CTA.    MRI brain 5/28/17:   1. Evolving infarct in the right middle cerebral artery distribution. This area of infarction is significantly larger than on the previous MR scan of 5/26/2017. It now involves most of the anterior division of the right middle cerebral. Smaller areas of infarction are seen in the right temporal lobe and the medial aspect of the right frontal lobe.  2. Right internal carotid artery is occluded.       Time Spent on this Encounter      Time:   Neurocritical care time:  I spent 35 minutes bedside and on the inpatient unit today managing the neurocritical care of Jair SERVANDO Fernández in relation to the issues listed in this note. Patient is critically ill[AZ1.1]              Revision History        User Key Date/Time User Provider Type Action    >  AZ1.2 6/1/2017  9:57 AM Lance Brandt MD Physician Addend     AZ1.3 6/1/2017  7:55 AM Lance Brandt MD Physician Sign     AZ1.1 6/1/2017  7:52 AM Lance Brandt MD Physician             Progress Notes by Larissa Walls OT at 6/1/2017  8:59 AM     Author:  Larissa Walls OT Service:  (none) Author Type:  Occupational Therapist    Filed:  6/1/2017  8:59 AM Date of Service:  6/1/2017  8:59 AM Creation Time:  6/1/2017  8:59 AM    Status:  Signed :  Larissa Walls OT (Occupational Therapist)          06/01/17 0815   Quick Adds   Type of Visit Initial Occupational Therapy Evaluation   Living Environment   Lives With other relative(s) (specify)  (nephew)   Living Arrangements house   Home Accessibility stairs to enter home;stairs within home;bed and bath are not on the first floor   Number of Stairs to Enter Home 2   Number of Stairs Within Home 5   Transportation Available car;family or friend will provide  (Pt no longer drives. Family/Friends provide as needed)   Self-Care   Dominant Hand right   Usual Activity Tolerance good   Current Activity Tolerance fair   Equipment Currently Used at Home none   Functional Level Prior   Ambulation 0-->independent   Transferring 0-->independent   Toileting 0-->independent   Bathing 0-->independent   Dressing 0-->independent   Eating 0-->independent   Communication 0-->understands/communicates without difficulty   Swallowing 0-->swallows foods/liquids without difficulty   Cognition 0 - no cognition issues reported   Fall history within last six months yes   Number of times patient has fallen within last six months 5   General Information   Onset of Illness/Injury or Date of Surgery - Date 05/26/17   Referring Physician FILOMENA Vargas MD   Patient/Family Goals Statement None stated at time of eval.    Additional Occupational Profile Info/Pertinent History of Current Problem Admitted for altered mental status due to recurrent seizures. Imaging indicated large  R sided CVA. PMH of ETOH usage.    Precautions/Limitations fall precautions;oxygen therapy device and L/min;swallowing precautions  (No supplemental O2 at baseline)   Cognitive Status Examination   Orientation person;time   Level of Consciousness alert;confused   Able to Follow Commands WNL/WFL   Personal Safety (Cognitive) at risk behaviors demonstrated   Memory impaired   Visual Perception   Visual Perception No deficits were identified   Visual Perception Comments Due to limited command following, unable to assess vision. Moderate R gaze preference noted.    Sensory Examination   Sensory Comments Pt reports L UE numbness/tingling. Unable to formally test B UE sensation due to limited command following   Pain Assessment   Patient Currently in Pain No   Range of Motion (ROM)   ROM Quick Adds Other (describe)   ROM Comment R UE WNL. No active ROM of L UE. Full PROM of L UE.    Strength   Manual Muscle Testing Quick Adds Other   Strength Comments R UE 3/5 and L UE 1/5   Hand Strength   Hand Strength Comments No L grasp noted. R grasp strong   Coordination   Upper Extremity Coordination Left UE impaired   Mobility   Bed Mobility Bed mobility skill: Rolling/Turning;Bed mobility skill: Scooting/Bridging;Bed mobility skill: Sit to supine;Bed mobility skill: Supine to sit;Bed mobility analysis   Bed Mobility Skill: Rolling/Turning   Level of [place] - Bed Mobility Skill Rolling Turning moderate assist (50% patients effort)   Physical Assist/Nonphysical Assist 2 persons   Bed Mobility Skill: Scooting/Bridging   Level of [place]: Scooting/Bridging moderate assist (50% patients effort)   Physical Assist/Nonphysical Assist: Scooting/Bridging 2 persons   Bed Mobility Skill: Sit to Supine   Level of [place]: Sit/Supine moderate assist (50% patients effort)   Physical Assist/Nonphysical Assist: Sit/Supine 2 persons   Bed Mobility Skill: Supine to Sit   Level of [place]: Supine/Sit moderate assist (50%  patients effort)   Physical Assist/Nonphysical Assist: Supine/Sit 2 persons   Bed Mobility Analysis   Bed Mobility Limitations cognitive deficits;decreased ability to use arms for pushing/pulling   Impairments Contributing to Impaired Bed Mobility impaired balance;decreased strength   Transfer Skill: Bed to Chair/Chair to Bed   Level of [place]: Bed to Chair unable to perform   Transfer Skill: Sit to Stand   Level of [place]: Sit/Stand unable to perform   Transfer Skill: Toilet Transfer   Level of [place]: Toilet unable to perform   Upper Body Dressing   Level of [place]: Dress Upper Body maximum assist (25% patients effort)   Lower Body Dressing   Level of [place]: Dress Lower Body maximum assist (25% patients effort)   Toileting   Level of [place]: Toilet maximum assist (25% patients effort)   Grooming   Level of [place]: Grooming moderate assist (50% patients effort)   Instrumental Activities of Daily Living (IADL)   Previous Responsibilities meal prep;housekeeping;laundry;finances   Activities of Daily Living Analysis   Impairments Contributing to Impaired Activities of Daily Living balance impaired;cognition impaired;coordination impaired;strength decreased   General Therapy Interventions   Planned Therapy Interventions ADL retraining;cognition;neuromuscular re-education;strengthening;transfer training   Clinical Impression   Criteria for Skilled Therapeutic Interventions Met yes, treatment indicated   OT Diagnosis Decreased I and safety with ADLs   Influenced by the following impairments L UE weakness and incoordination; Impaired cognition and safety; L side inattentiveness; Decreased balance   Assessment of Occupational Performance 5 or more Performance Deficits   Identified Performance Deficits Toileting; Dressing; Bathing; Social skills; Home mgmt; Hygiene   Clinical Decision Making (Complexity) High complexity   Therapy Frequency 2 times/day   Predicted Duration of  "Therapy Intervention (days/wks) 5 days   Anticipated Discharge Disposition Acute Rehabilitation Facility   Risks and Benefits of Treatment have been explained. Yes   Patient, Family & other staff in agreement with plan of care Yes   [facility] TM \"6 Clicks\"   2016, Trustees of [employer] [employer] [facility] [employer], under license to [employer].  All rights reserved.   6 Clicks Short Forms Daily Activity Inpatient Short Form   [facility] [employer] [facility] [employer] AM-PAC  \"6 Clicks\" Daily Activity Inpatient Short Form   1. Putting on and taking off regular lower body clothing? 1 - Total   2. Bathing (including washing, rinsing, drying)? 1 - Total   3. Toileting, which includes using toilet, bedpan or urinal? 1 - Total   4. Putting on and taking off regular upper body clothing? 2 - A Lot   5. Taking care of personal grooming such as brushing teeth? 2 - A Lot   6. Eating meals? 3 - A Little   Daily Activity Raw Score (Score out of 24.Lower scores equate to lower levels of function) 10   Total Evaluation Time   Total Evaluation Time (Minutes) 12[MH1.1]        Revision History        User Key Date/Time User Provider Type Action    > MH1.1 6/1/2017  8:59 AM Larissa Walls OT Occupational Therapist Sign            Progress Notes by Minoo Orlando RT at 6/1/2017 12:58 AM     Author:  Minoo Orlando RT Service:  (none) Author Type:  Respiratory Therapist    Filed:  6/1/2017  1:01 AM Date of Service:  6/1/2017 12:58 AM Creation Time:  6/1/2017 12:58 AM    Status:  Signed :  Minoo Orlando RT (Respiratory Therapist)         Pt on BIPAP 10/5 30% throughout the night, with SATs in the upper 90's. BBS diminished, schedule Q6 nebs given. Will continue to follow.[HA1.1]  6/1/2017  Minoo Orlando[HA1.2]       Revision History        User Key Date/Time User Provider Type Action    > HA1.2 6/1/2017  1:01 AM Minoo Orlando RT Respiratory Therapist Sign     HA1.1 6/1/2017 12:58 AM Minoo Orlando RT Respiratory Therapist             Progress Notes by " "Marquita Castle, PT at 5/31/2017  6:16 PM     Author:  Marquita Castle PT Service:  (none) Author Type:  Physical Therapist    Filed:  5/31/2017  6:17 PM Date of Service:  5/31/2017  6:16 PM Creation Time:  5/31/2017  6:16 PM    Status:  Signed :  Marquita Castle PT (Physical Therapist)            05/31/17 1500   Quick Adds   Type of Visit Initial PT Evaluation   Living Environment   Lives With (nephew)   Living Arrangements house   Home Accessibility stairs within home;stairs to enter home   Transportation Available family or friend will provide;car   Self-Care   Activity/Exercise/Self-Care Comment Subjective difficult to obtain as pt with dry mouth, soft voice, mumbled speech.   Functional Level Prior   Ambulation 0-->independent   Transferring 0-->independent   Toileting 0-->independent   Bathing 0-->independent   Dressing 0-->independent   Eating 0-->independent   Communication 0-->understands/communicates without difficulty   Swallowing 0-->swallows foods/liquids without difficulty   Cognition 0 - no cognition issues reported   Prior Functional Level Comment Pt nods \"yes\" to questions of independence prior to admit with ADLs and mobility   General Information   Onset of Illness/Injury or Date of Surgery - Date 05/26/17   Referring Physician Bronson Vargas MD   Patient/Family Goals Statement None stated.   Pertinent History of Current Problem (include personal factors and/or comorbidities that impact the POC) 67 yo male found down at a gas station, intubated in the field. 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 Difficult to discern baseline as pt with difficulty speaking, but does appear to be nodding \"yes\" and \"no \" appropriately.    Precautions/Limitations fall precautions;oxygen therapy device and L/min  (3L oxy mask)   General Observations rectal tube "   Cognitive Status Examination   Orientation person   Level of Consciousness alert;lethargic/somnolent   Follows Commands and Answers Questions 100% of the time  (Pt follows, needs repeat of cues at times and slow process)   Cognitive Comment Not formally assessed defer to OT   Pain Assessment   Patient Currently in Pain No   Integumentary/Edema   Integumentary/Edema Comments Thin and frail inappearance   Posture    Posture Forward head position;Protracted shoulders;Kyphosis   Range of Motion (ROM)   ROM Comment R LE WLF with AAROM, PROM on the L WFL slight tone noted. L UE WFL PROM, R UE WFL    Strength   Strength Comments Significant weakness at the core and demonstrates minimal muscle contractions proximally at the hip and shouder on natalie L. No active muscle strength noted distally on natalie L. R UE and LE with at least antigravity strength.   Bed Mobility   Bed Mobility Comments Max A x 2 sup>sit    Transfer Skills   Transfer Comments Unable to stand secondary to BP and decreased posture/strength   Balance   Balance Comments Sitting balance rquires at least Moderate assist at the trunk, variable at time Max A x 2.   General Therapy Interventions   Planned Therapy Interventions balance training;bed mobility training;gait training;neuromuscular re-education;strengthening;ROM;stretching;transfer training;progressive activity/exercise   Clinical Impression   Criteria for Skilled Therapeutic Intervention yes, treatment indicated   PT Diagnosis Difficulty wth gait   Influenced by the following impairments L sided weakness, fatigue, decreased activity tolerance poor balance   Functional limitations due to impairments Decreaed functional independence   Clinical Presentation Stable/Uncomplicated   Clinical Decision Making (Complexity) Moderate complexity   Therapy Frequency` daily   Predicted Duration of Therapy Intervention (days/wks) 1 week   Anticipated Discharge Disposition Transitional Care Facility   Risk & Benefits of  "therapy have been explained Yes   Patient, Family & other staff in agreement with plan of care Yes   [facility] AM-PAC TM \"6 Clicks\"   2016, Trustees of [facility], under license to [employer].  All rights reserved.   6 Clicks Short Forms Basic Mobility Inpatient Short Form   [facility] AM-PAC  \"6 Clicks\" V.2 Basic Mobility Inpatient Short Form   1. Turning from your back to your side while in a flat bed without using bedrails? 2 - A Lot   2. Moving from lying on your back to sitting on the side of a flat bed without using bedrails? 2 - A Lot   3. Moving to and from a bed to a chair (including a wheelchair)? 1 - Total   4. Standing up from a chair using your arms (e.g., wheelchair, or bedside chair)? 1 - Total   5. To walk in hospital room? 1 - Total   6. Climbing 3-5 steps with a railing? 1 - Total   Basic Mobility Raw Score (Score out of 24.Lower scores equate to lower levels of function) 8   Total Evaluation Time   Total Evaluation Time (Minutes) 10[SS1.1]        Revision History        User Key Date/Time User Provider Type Action    > SS1.1 5/31/2017  6:17 PM Marquita Castle, PT Physical Therapist Sign            Progress Notes by Serena De Guzman RT at 5/31/2017  1:51 PM     Author:  Serena De Guzman RT Service:  (none) Author Type:  Respiratory Therapist    Filed:  5/31/2017  2:07 PM Date of Service:  5/31/2017  1:51 PM Creation Time:  5/31/2017  1:51 PM    Status:  Signed :  Serena De Guzman RT (Respiratory Therapist)         Pt was audibly wheezy this am and given albuterol nebs X3, placed on bipap 10/5 10 50%. Pt has recovered with exacerbation and placed on 3L Oxy plus mask with clear/diminished breath sounds. Will continue to follow and give Q6 albuterol nebs.[RN1.1]     Revision History        User Key Date/Time User Provider Type Action    > RN1.1 5/31/2017  2:07 PM Serena De Guzman RT Respiratory Therapist Sign            Progress Notes by Bronson Vargas MD " at 5/31/2017 12:09 PM     Author:  Bronson Vargas MD Service:  Medical ICU Author Type:  Physician    Filed:  5/31/2017 12:34 PM Date of Service:  5/31/2017 12:09 PM Creation Time:  5/31/2017 12:09 PM    Status:  Addendum :  Bronson Vargas MD (Physician)         Critical Care Progress Note      05/31/2017    Name: Jair Fernández MRN#: [id number]   Age: 66 year old YOB: 1950     [facility] Day# 5      Pt seen/evaluated at 8 am.           Problem List:   Active Problems:    Altered mental status, unspecified altered mental status type    Cerebrovascular accident (H)           Summary/Hospital Course:     Jair Fernández is a 66 year old male who presents with AMS.  EMS  Found pt unresponsive outside on the ground of a local [employer]. Brought to the ED  The patient was also hypotensive with systolic pressures in the 70s.  Intubated there.  Per pt's sister he has a history of neck cancer and surgery, heavy EtOH use, smoker. Weaned off pressors, now hypertensive, EEG showed no seizure, MRI 5/28 showed acutely worsened stroke--R sided infarcts.  Successfully extubated on 5/30, but wheezy morning of 5/31 and respiratory distress.  Bipap and continuous neb started.      Assessment and plan :     Jair Fernández IS a 66 year old male admitted on 5/26/2017 for altered mental status/CVA.   I have personally reviewed the daily labs, imaging studies, cultures and discussed the case with referring physician and consulting physicians.   My assessment and plan by system for this patient is as follows:    Neurology/Psychiatry:   1. Large R CVA:  Appreciate neurology/neuro CC input.    2. Analgesia:  Fentanyl prn  3. Anxiety:  Prn ativan  4.  H/o etoh use:  Monitor for withdrawal. Ativan prn.  Thiamine supplementation.    Cardiovascular:   1.Hypotension:  Resolved.  Allow permissive hypertension in setting of recent CVA.    Pulmonary/Ventilator Management:   1. Respiratory distress:  Very wheezy and tight,  most likely COPD exacerbation.  Improved somewhat with albuterol neb, but still in distress.  Will trial albuterol continuous neb and bipap.  High probability of need for re-intubation.  2. COPD-exacerbation:  Continue nebs as above.  Will also burst steroids.     GI and Nutrition :   1. Malnutrition:  TF.    2.  PPI for PUD prophy  3.  Refeeding syndrome:  Hypophos resolved.  aggressvie phos repletion.  Slow TF if needed.    Renal/Fluids/Electrolytes:   1. KENYATTA resolved.  Monitor.    Infectious Disease:   1. Leukocytosis improving.  cx neg x4d, procal neg.  Off abx.    Endocrine:   1. Monitor fsg:  Insulin vs dextrose prn.    Hematology/Oncology:   1. Leukocytosis:  Resolving  2.  Anemia: stable, no active bleeding.  3. pltlts ok.    ICU Prophylaxis:   1. DVT: Hep Subq/mechanical[DW1.1]  2[DW1.2]. IV Access - central access required and necessary for continued patient cares[DW1.1]  3[DW1.2]. Feeding - tf[DW1.1]  4[DW1.2]. Family Update: per neuro CC:  Made full code by sister Nikkie. Family mtg tomorrow at 9 am.[DW1.1]   5[DW1.2]. Disposition - critically ill        Key goals for next 24 hours:   1. Stopping zoysn  2. Family meeting tomorrow to discuss goals of care  3. Aggressive phos repletion.         Interim History:     Successfully extubated on 5/30, but wheezy morning of 5/31 and respiratory distress.  Bipap and continuous neb started.         Key Medications:       fosphenytoin (CEREBYX) intermittent infusion (maintenance)  100 mg PE Intravenous Q12H     sodium chloride (PF)  10 mL Intracatheter Q8H     heparin  5,000 Units Subcutaneous Q8H     thiamine  200 mg Oral or Feeding Tube Daily     albuterol  2.5 mg Nebulization Q6H       NaCl 100 mL/hr at 05/31/17 0909     propofol (DIPRIVAN) infusion Stopped (05/30/17 1504)     - MEDICATION INSTRUCTIONS -       IV fluid REPLACEMENT ONLY                 Physical Examination:   Temp:  [98.2  F (36.8  C)-99.1  F (37.3  C)] 98.2  F (36.8  C)  Heart Rate:  []  95  Resp:  [10-45] 22  BP: (119-219)/() 122/75  FiO2 (%):  [40 %-50 %] 50 %  SpO2:  [89 %-100 %] 95 %[DW1.1]        Intake/Output Summary (Last 24 hours) at 05/31/17 1224  Last data filed at 05/31/17 1100   Gross per 24 hour   Intake             2276 ml   Output             3590 ml   Net            -1314 ml[DW1.3]       Wt Readings from Last 4 Encounters:   05/31/17 55.1 kg (121 lb 7.6 oz)     BP - Mean:  [] 87  Ventilation Mode: PS  FiO2 (%): 50 %  Rate Set (breaths/minute): 16 breaths/min  Tidal Volume Set (mL): 450 mL  PEEP (cm H2O): 5 cmH2O  Pressure Support (cm H2O): 5 cmH2O  Oxygen Concentration (%): 40 %  Resp: 22    Recent Labs  Lab 05/28/17  0612 05/27/17  0529 05/26/17  1155   PH 7.40 7.44 7.34*   PCO2 38 32* 39   PO2 109* 111* 151*   HCO3 23 22 21   O2PER  --   --  40%       GEN: mild resp distress.  Tachycardic/hypertensive.  HEENT: head ncat, sclera anicteric, OP patent, trachea midline   PULM: somewhat labored, wheezy, very tight.    CV/COR: RRR S1S2 no gallop,  No rub, no murmur  ABD: soft nontender, no mass  EXT:  No Edema ;  Warm x4  NEURO: awake, alert follows.  Moves R well, hemiplegic on L.  SKIN: no obvious rash  LINES: clean, dry intact         Data:   All data and imaging reviewed     ROUTINE ICU LABS (Last four results)  CMP    Recent Labs  Lab 05/31/17  0440 05/30/17  0445 05/29/17  0435 05/28/17  0415 05/27/17  0555 05/26/17  1730    144 140 135 135  --    POTASSIUM 3.8 3.7 3.2* 5.0 3.9  --    CHLORIDE 107 112* 109 104 103  --    CO2 20 25 25 23 25  --    ANIONGAP 13 7 6 8 7  --    GLC 62* 104* 108* 125* 163*  --    BUN 10 10 14 13 12  --    CR 0.61* 0.70 0.83 0.87 0.83  --    GFRESTIMATED >90Non  GFR Calc >90Non  GFR Calc >90Non  GFR Calc 89 >90Non  GFR Calc  --    GFRESTBLACK >90African American GFR Calc >90African American GFR Calc >90African American GFR Calc >90African American GFR Calc >90African  American GFR Calc  --    DARINEL 8.4* 8.6 8.2* 8.0* 8.3*  --    MAG 1.8 2.0 2.1 2.3 1.8 1.9   PHOS 3.3 3.0 1.9* 2.6 2.9 3.3   PROTTOTAL  --  6.4*  --  6.3* 6.7* 6.4*   ALBUMIN  --  2.1*  --  2.5* 3.2* 2.8*   BILITOTAL  --  0.6  --  0.6 0.5 0.4   ALKPHOS  --  209*  --  57 52 54   AST  --  56*  --  31 25 22   ALT  --  25  --  18 17 18     CBC    Recent Labs  Lab 05/31/17  0440 05/30/17  0445 05/29/17  0435 05/28/17  0415   WBC 13.8* 13.1* 16.1* 18.0*   RBC 3.87* 3.54* 3.43* 3.81*   HGB 11.8* 10.8* 10.3* 11.6*   HCT 34.7* 32.2* 31.2* 34.7*   MCV 90 91 91 91   MCH 30.5 30.5 30.0 30.4   MCHC 34.0 33.5 33.0 33.4   RDW 15.6* 15.7* 15.3* 15.0    240 173 169     INR    Recent Labs  Lab 05/26/17  1200   INR 1.10     Arterial Blood Gas    Recent Labs  Lab 05/28/17  0612 05/27/17  0529 05/26/17  1155   PH 7.40 7.44 7.34*   PCO2 38 32* 39   PO2 109* 111* 151*   HCO3 23 22 21   O2PER  --   --  40%       All cultures:    Recent Labs  Lab 05/26/17  1730 05/26/17  1200   CULT No growth after 5 days No growth after 5 days     No results found for this or any previous visit (from the past 24 hour(s)).    [facility]    AM labs (personally reviewed):  Lytes/creatinine acceptable/stable.  Leukocytosis stable/resolving 13.8.  hgb stable 11.8.  pltlts acceptable 271.    VBG (personally reviewed):  7.29/41/44/20:  Mild metabolic acidosis, but expect a lower ph because venous draw.  Normocapnic.    Billing: This patient is critically ill: Yes--respiratory distress with high probability of need for re-intubation. Total critical care time today 35 min.        ADDENDUM 1230:  Respiratory status improved.  Neb completed.  Weaning off bipap.  Will monitor off bipap for now.[DW1.1]         Revision History        User Key Date/Time User Provider Type Action    > DW1.2 5/31/2017 12:34 PM Bronson Vargas MD Physician Addend     DW1.3 5/31/2017 12:32 PM Bronson Vargas MD Physician Sign     DW1.1 5/31/2017 12:09 PM  Bronson Vargas MD Physician             Progress Notes by Ayesha Jacob APRN CNP at 5/31/2017  8:15 AM     Author:  Ayesha Jacob APRN CNP Service:  Neuro ICU Author Type:  Nurse Practitioner    Filed:  5/31/2017  8:23 AM Date of Service:  5/31/2017  8:15 AM Creation Time:  5/31/2017  8:15 AM    Status:  Attested :  Ayesha Jacob APRN CNP (Nurse Practitioner)    Cosigner:  Lance Brandt MD at 5/31/2017  9:10 AM        Attestation signed by Lance Brandt MD at 5/31/2017  9:10 AM        Physician Attestation   I, Lance Brandt, saw and evaluated Jair Fernández as part of a shared visit.  I have reviewed and discussed with the advanced practice provider their history, physical and plan.    I personally reviewed the vital signs, medications, labs and imaging.    My key history or physical exam findings: Extubated, no BiPAP    Key management decisions made by me: Awaiting for family conference.     Lance Brandt  Date of Service (when I saw the patient): 05/31/17                               [facility]  [facility] [place]  Neuro-ICU Progress Note       Admission Date: 5/26/2017  Date of Service:05/31/2017   Hospital Day: 6     _________________________________     Main Plans for Today   --Continue supportive care  --meeting with family today  Assessment & Plan   #. (I63.9) Cerebrovascular accident (CVA), unspecified mechanism (H)  (primary encounter diagnosis)  --Significant acute extension of right sided stroke on MRI  --Previous multiple strokes  --Right ICA occlusion is likely chronic, given chronic right sided strokes  --no improvements  #. (G40.909) Recurrent seizures (H)  --received 4 mg of Ativan  --loaded with fosphenytoin  --EEG showed right attenuation, some left frontotemporal sharply contoured activity, but otherwise was unremarkable with no seizures  --phenytoin level 12.3 on 5/28- continue scheduled fosphenytoin 100 mg  "BID.  -----Repeat phenytoin level  is 7  #. (M62.81) Left-sided weakness  --See above  #. (J96.00) Acute respiratory failure, unspecified whether with hypoxia or hypercapnia (HCC)  --[MC1.1]extubated on   ----on BiPAP[MC1.2]  #. Cardiovascular  --No acute issues  #. Infection   --Elevated WBC  --Management per medical intensivists.   #. Endocrine:   --Insulin protocol to keep blood sugars 100-150.  #. Heme/Onc:   --mild anemia, stable  #. Renal  --Sodium goal 140-155  #. Skin/Musculoskeletal:  --No issues  #. PT/OT/Speech  --hold evaluations   #. Nutrition  --Per speech therapy evaluation   #. ICU prophylaxis:   --Stress ulcer prophylaxis: Protonix  --VAP: per protocol  --DVT prophylaxis: mechanical + heparin      CODE STATUS: FULL        Interval History   No changes. No improvement. No movement on the left. Following commands on the right. Nodding/shaking head to communicate. Denies pain.[MC1.1] Extubated on  evening. On BiPAP currently.[MC1.2] Meeting with family and palliative this morning.    Physical Exam     Vitals: Blood pressure (!) 184/106, pulse (!) 0, temperature 98.2  F (36.8  C), temperature source Axillary, resp. rate (!) 36, height 1.651 m (5' 5\"), weight 55.1 kg (121 lb 7.6 oz), SpO2 93 %.  Tmax: Temp (24hrs), Av.7  F (37.1  C), Min:98.2  F (36.8  C), Max:99  F (37.2  C)    Vital Signs with Ranges  Temp:  [95.4  F (35.2  C)-99.1  F (37.3  C)] 98.2  F (36.8  C)  Heart Rate:  [] 133  Resp:  [10-45] 36  BP: (139-219)/() 184/106  FiO2 (%):  [40 %-50 %] 50 %  SpO2:  [89 %-100 %] 93 %   I&O:  I/O this shift:  In: 150 [I.V.:150]  Out: 200 [Urine:200]  I/O last 3 completed shifts:  In: 2700 [I.V.:2480; NG/GT:60]  Out: 4015 [Urine:4015] I/O since admission: +4115.28         General Appearance:   No acute distress  Neuro:       Mental Status Exam:[MC1.1]  Off sedation[MC1.2],[MC1.1] on BiPAP[MC1.2],[MC1.1] lethargic[MC1.2]. S/L untestable due to[MC1.1] BiPAP[MC1.2]. Eyes open, not " tracking. Nods/shakes head to communicate. Follows commands on the right.  Mental status is decreased        Cranial Nerves:  Pupils 3 mm, reactive. Occulocephalis reflexes are present.  Corneal reflexes present. Face symmetric. Gag/Cough reflex present.Other CN are untestable           Motor:  Left sided plegia, good strength on the right       Reflexes:  Low DTR, toes equivocal       Sensory:  Untestable                    Coordination:   Untestable       Gait:  Untestable  Cardiovascular: Regular rate and rhythm, no m/r/g  Lungs: Ventilation Mode: PS  FiO2 (%): 50 %  Rate Set (breaths/minute): 16 breaths/min  Tidal Volume Set (mL): 450 mL  PEEP (cm H2O): 5 cmH2O  Pressure Support (cm H2O): 5 cmH2O  Oxygen Concentration (%): 40 %  Resp: 36  Both hemithoraces are symmetrical, auscultation of lungs revealed no bronchovesicular sounds, expirium prolongation, wheezing, rhonci and crackles  Abdomen: Soft, not tender, not distended  Skin/Extremities: No clubbing, cyanosis, no edema     Medications   Infusions medications:    NaCl 100 mL/hr at 05/30/17 2050     propofol (DIPRIVAN) infusion Stopped (05/30/17 1504)     - MEDICATION INSTRUCTIONS -       IV fluid REPLACEMENT ONLY       Schedule medications:    albuterol  15 mg Nebulization Once     pantoprazole  40 mg Intravenous QAM AC     lidocaine         fosphenytoin (CEREBYX) intermittent infusion (maintenance)  100 mg PE Intravenous Q12H     sodium chloride (PF)  10 mL Intracatheter Q8H     heparin  5,000 Units Subcutaneous Q8H     thiamine  200 mg Oral or Feeding Tube Daily     albuterol  2.5 mg Nebulization Q6H     PRN medications:fentaNYL, polyethylene glycol, bisacodyl, LORazepam, potassium chloride, potassium chloride, potassium chloride, potassium chloride with lidocaine, potassium chloride, magnesium sulfate, magnesium sulfate, potassium phosphate (KPHOS) in D5W IV, potassium phosphate (KPHOS) in D5W IV, potassium phosphate (KPHOS) in D5W IV, potassium phosphate  (KPHOS) in D5W IV, potassium phosphate (KPHOS) in D5W IV, ondansetron **OR** ondansetron, senna-docusate, sodium phosphate, naloxone, - MEDICATION INSTRUCTIONS -, lidocaine 4%, heparin lock flush, sodium chloride (PF), IV fluid REPLACEMENT ONLY  Data       Labotary Data:   All data was reviewed by me personally  CBC RESULTS:  Recent Labs   Lab Test  05/31/17 0440 05/30/17 0445 05/29/17 0435  05/28/17   0415  05/27/17   0555  05/26/17   1200   WBC  13.8*  13.1*  16.1*  18.0*  20.7*  12.2*   RBC  3.87*  3.54*  3.43*  3.81*  3.93*  3.90*   HGB  11.8*  10.8*  10.3*  11.6*  11.9*  11.8*   HCT  34.7*  32.2*  31.2*  34.7*  35.7*  35.8*   PLT  271  240  173  169  237  236     Basic Metabolic Panel:  Recent Labs   Lab Test  05/31/17   0440 05/30/17   0445 05/29/17 0435  05/28/17   0415  05/27/17   0555  05/26/17   1200   NA  140  144  140  135  135  137   POTASSIUM  3.8  3.7  3.2*  5.0  3.9  4.2   CHLORIDE  107  112*  109  104  103  105   CO2  20  25  25  23  25  22   BUN  10  10  14  13  12  15   CR  0.61*  0.70  0.83  0.87  0.83  1.20   GLC  62*  104*  108*  125*  163*  156*   DARINEL  8.4*  8.6  8.2*  8.0*  8.3*  7.7*     ABG:  Recent Labs   Lab Test  05/28/17   0612  05/27/17   0529  05/26/17   1155   PH  7.40  7.44  7.34*   PCO2  38  32*  39   PO2  109*  111*  151*   HCO3  23  22  21   O2PER   --    --   40%     Liver panel:  Recent Labs   Lab Test  05/30/17 0445 05/28/17 0415 05/27/17   0555  05/26/17   1730   PROTTOTAL  6.4*  6.3*  6.7*  6.4*   ALBUMIN  2.1*  2.5*  3.2*  2.8*   BILITOTAL  0.6  0.6  0.5  0.4   ALKPHOS  209*  57  52  54   AST  56*  31  25  22   ALT  25  18  17  18     Procalcitonin:   Recent Labs   Lab Test  05/26/17   1730   PCAL  <0.05  <0.05 ng/ml  Normal  Recommendation: Very low risk of bacterial infection.   Discourage antibiotics unless strong clinical suspicion for serious infection.       Coagulation  Recent Labs   Lab Test  05/26/17   1200   INR  1.10   PTT  28      Lipid  Profile:No lab results found.  Thyroid Panel:No lab results found.   Vitamin B12:   Recent Labs   Lab Test  05/28/17   1700   B12  312      Vitamin D:  Recent Labs   Lab Test  05/28/17   1700   VITDT  11*     A1C: No lab results found.  Troponin I:   Recent Labs   Lab Test  05/26/17   1730   TROPI  <0.015  The 99th percentile for upper reference range is 0.045 ug/L.  Troponin values in   the range of 0.045 - 0.120 ug/L may be associated with risks of adverse   clinical events.       Ammonia:   Recent Labs   Lab Test  05/28/17   0415   MOOK  24     Most Recent 6 Bacteria Isolates From Any Culture (See EPIC Reports for Culture Details):  Recent Labs   Lab Test  05/26/17   1730  05/26/17   1200   CULT  No growth after 5 days  No growth after 5 days     UA Results:  Recent Labs   Lab Test  05/26/17   1515   COLOR  Light Yellow   APPEARANCE  Clear   URINEGLC  70*   URINEBILI  Negative   URINEKETONE  Negative   SG  1.015   UBLD  Negative   URINEPH  7.0   PROTEIN  Negative   NITRITE  Negative   LEUKEST  Negative   RBCU  0   WBCU  <1          Cardiac US:   The visual ejection fraction is estimated at 60-65%. No regional wall motion abnormalities noted. The study was technically difficult. Limited views were obtained.       Neurophysiology:   5/26/17:  Encephalopathic EEG with significant asymmetry between hemispheres.        Imaging:   All imaging studies were reviewed personally  CT head 5/26/17:   1. Probable recent although not definitely acute border zone infarcts in the high right cerebral hemisphere.  2. No definite acute infarcts noted.  3. Diffuse cerebral volume loss and cerebral white matter changes consistent with chronic small vessel ischemic disease.    CT perfusion 5/26/17:   --There is delayed arrival of the contrast bolus to the right anterior cerebral and right middle cerebral artery territories consistent with the known right internal carotid artery occlusion. This results in mildly decreased cerebral  blood flow but increased cerebral blood volume to the right KELIN and right MCA territories suggesting vasodilatation of the right KELIN and right MCA territories to accommodate for decreased cerebral blood flow. There are no findings to suggest acute ischemia or infarct. Perfusion defects corresponding to chronic right frontal and right parietal infarcts are noted. No other perfusion abnormalities.    CT angiography neck/head 5/26/17:   1. Age-indeterminate complete occlusion of the right internal carotid artery from the origin to the supraclinoid segment. Given chronic appearing right cerebral hemisphere infarcts, there is likely long-standing disease in the right internal carotid artery although the time of occlusion cannot be determined.  2. Minimal calcified atherosclerotic plaque at the origin of the left internal carotid artery that does not result in stenosis.  3. Otherwise, normal neck and head CTA.    MRI brain 5/28/17:   1. Evolving infarct in the right middle cerebral artery distribution. This area of infarction is significantly larger than on the previous MR scan of 5/26/2017. It now involves most of the anterior division of the right middle cerebral. Smaller areas of infarction are seen in the right temporal lobe and the medial aspect of the right frontal lobe.  2. Right internal carotid artery is occluded.       Time Spent on this Encounter      Time:   Neurocritical care time:  I spent 50 minutes bedside and on the inpatient unit today managing the neurocritical care of Jair Fernández in relation to the issues listed in this note. Patient is critically ill       FELA Walters[MC1.1]       Revision History        User Key Date/Time User Provider Type Action    > MC1.2 5/31/2017  8:23 AM Ayesha Jacob APRN CNP Nurse Practitioner Sign     MC1.1 5/31/2017  8:15 AM Ayesha Jacob APRN CNP Nurse Practitioner                      Procedure Notes      Procedures signed by Karly  Narciso CHRISTIANSON MD at 2017  9:52 AM      Author:  Narciso Brandt MD Service:  Neurology Author Type:  Physician    Filed:  2017  9:52 AM Date of Service:  2017  1:50 PM Creation Time:  2017  4:23 PM    Status:  Signed :  Narciso Brandt MD (Physician)     Procedure Orders:    1. EEG [[id number]] ordered by Narciso Brandt MD at 17 1623                ONE-HOUR ELECTROENCEPHALOGRAM WITH VIDEO      ORDERING PHYSICIAN:  Narciso Brandt MD      EEG # , One hour with video       EEG on Jair Fernández demonstrated significant suppression of the left side of the hemisphere with 5-6 Hz activity on the left side of the brain.  There are no clear epileptiform discharges seen on this EEG and no electrographic seizures seen on this EEG.      IMPRESSION:  Encephalopathic EEG with significant asymmetry between hemispheres.  Clinical correlation required.         NARCISO BRANDT MD, PHD, [facility]             D: 2017 13:50   T: 2017 09:17   MT: sandrita      Name:     JAIR FERNÁNDEZ   MRN:      [id number]        Account:        [id number]   :      1950           Procedure Date: 2017      Document: [id number]    [AZ1.1]   Revision History        User Key Date/Time User Provider Type Action    > AZ1.1 2017  9:52 AM Narciso Brandt MD Physician Sign                     Progress Notes - Therapies (Notes from 17 through 17)      Progress Notes by Larissa Walls OT at 2017  8:59 AM     Author:  Larissa Walls OT Service:  (none) Author Type:  Occupational Therapist    Filed:  2017  8:59 AM Date of Service:  2017  8:59 AM Creation Time:  2017  8:59 AM    Status:  Signed :  Larissa Walls OT (Occupational Therapist)          17 0815   Quick Adds   Type of Visit Initial Occupational Therapy Evaluation   Living Environment   Lives With other relative(s) (specify)  (nephew)   Living Arrangements house   Home  Accessibility stairs to enter home;stairs within home;bed and bath are not on the first floor   Number of Stairs to Enter Home 2   Number of Stairs Within Home 5   Transportation Available car;family or friend will provide  (Pt no longer drives. Family/Friends provide as needed)   Self-Care   Dominant Hand right   Usual Activity Tolerance good   Current Activity Tolerance fair   Equipment Currently Used at Home none   Functional Level Prior   Ambulation 0-->independent   Transferring 0-->independent   Toileting 0-->independent   Bathing 0-->independent   Dressing 0-->independent   Eating 0-->independent   Communication 0-->understands/communicates without difficulty   Swallowing 0-->swallows foods/liquids without difficulty   Cognition 0 - no cognition issues reported   Fall history within last six months yes   Number of times patient has fallen within last six months 5   General Information   Onset of Illness/Injury or Date of Surgery - Date 05/26/17   Referring Physician FILOMENA Vargas MD   Patient/Family Goals Statement None stated at time of eval.    Additional Occupational Profile Info/Pertinent History of Current Problem Admitted for altered mental status due to recurrent seizures. Imaging indicated large R sided CVA. PMH of ETOH usage.    Precautions/Limitations fall precautions;oxygen therapy device and L/min;swallowing precautions  (No supplemental O2 at baseline)   Cognitive Status Examination   Orientation person;time   Level of Consciousness alert;confused   Able to Follow Commands WNL/WFL   Personal Safety (Cognitive) at risk behaviors demonstrated   Memory impaired   Visual Perception   Visual Perception No deficits were identified   Visual Perception Comments Due to limited command following, unable to assess vision. Moderate R gaze preference noted.    Sensory Examination   Sensory Comments Pt reports L UE numbness/tingling. Unable to formally test B UE sensation due to limited command following   Pain  Assessment   Patient Currently in Pain No   Range of Motion (ROM)   ROM Quick Adds Other (describe)   ROM Comment R UE WNL. No active ROM of L UE. Full PROM of L UE.    Strength   Manual Muscle Testing Quick Adds Other   Strength Comments R UE 3/5 and L UE 1/5   Hand Strength   Hand Strength Comments No L grasp noted. R grasp strong   Coordination   Upper Extremity Coordination Left UE impaired   Mobility   Bed Mobility Bed mobility skill: Rolling/Turning;Bed mobility skill: Scooting/Bridging;Bed mobility skill: Sit to supine;Bed mobility skill: Supine to sit;Bed mobility analysis   Bed Mobility Skill: Rolling/Turning   Level of [place] - Bed Mobility Skill Rolling Turning moderate assist (50% patients effort)   Physical Assist/Nonphysical Assist 2 persons   Bed Mobility Skill: Scooting/Bridging   Level of [place]: Scooting/Bridging moderate assist (50% patients effort)   Physical Assist/Nonphysical Assist: Scooting/Bridging 2 persons   Bed Mobility Skill: Sit to Supine   Level of [place]: Sit/Supine moderate assist (50% patients effort)   Physical Assist/Nonphysical Assist: Sit/Supine 2 persons   Bed Mobility Skill: Supine to Sit   Level of [place]: Supine/Sit moderate assist (50% patients effort)   Physical Assist/Nonphysical Assist: Supine/Sit 2 persons   Bed Mobility Analysis   Bed Mobility Limitations cognitive deficits;decreased ability to use arms for pushing/pulling   Impairments Contributing to Impaired Bed Mobility impaired balance;decreased strength   Transfer Skill: Bed to Chair/Chair to Bed   Level of [place]: Bed to Chair unable to perform   Transfer Skill: Sit to Stand   Level of [place]: Sit/Stand unable to perform   Transfer Skill: Toilet Transfer   Level of [place]: Toilet unable to perform   Upper Body Dressing   Level of [place]: Dress Upper Body maximum assist (25% patients effort)   Lower Body Dressing   Level of [place]: Dress Lower Body maximum  "assist (25% patients effort)   Toileting   Level of [place]: Toilet maximum assist (25% patients effort)   Grooming   Level of [place]: Grooming moderate assist (50% patients effort)   Instrumental Activities of Daily Living (IADL)   Previous Responsibilities meal prep;housekeeping;laundry;finances   Activities of Daily Living Analysis   Impairments Contributing to Impaired Activities of Daily Living balance impaired;cognition impaired;coordination impaired;strength decreased   General Therapy Interventions   Planned Therapy Interventions ADL retraining;cognition;neuromuscular re-education;strengthening;transfer training   Clinical Impression   Criteria for Skilled Therapeutic Interventions Met yes, treatment indicated   OT Diagnosis Decreased I and safety with ADLs   Influenced by the following impairments L UE weakness and incoordination; Impaired cognition and safety; L side inattentiveness; Decreased balance   Assessment of Occupational Performance 5 or more Performance Deficits   Identified Performance Deficits Toileting; Dressing; Bathing; Social skills; Home mgmt; Hygiene   Clinical Decision Making (Complexity) High complexity   Therapy Frequency 2 times/day   Predicted Duration of Therapy Intervention (days/wks) 5 days   Anticipated Discharge Disposition Acute Rehabilitation Facility   Risks and Benefits of Treatment have been explained. Yes   Patient, Family & other staff in agreement with plan of care Yes   [facility] TM \"6 Clicks\"   2016, Trustees of [facility], under license to [employer].  All rights reserved.   6 Clicks Short Forms Daily Activity Inpatient Short Form   [facility]  \"6 Clicks\" Daily Activity Inpatient Short Form   1. Putting on and taking off regular lower body clothing? 1 - Total   2. Bathing (including washing, rinsing, drying)? 1 - Total   3. Toileting, which includes using toilet, bedpan or urinal? 1 - Total   4. Putting on and taking " off regular upper body clothing? 2 - A Lot   5. Taking care of personal grooming such as brushing teeth? 2 - A Lot   6. Eating meals? 3 - A Little   Daily Activity Raw Score (Score out of 24.Lower scores equate to lower levels of function) 10   Total Evaluation Time   Total Evaluation Time (Minutes) 12[MH1.1]        Revision History        User Key Date/Time User Provider Type Action    > MH1.1 6/1/2017  8:59 AM Larissa Walls OT Occupational Therapist Sign            Progress Notes by Minoo Orlando RT at 6/1/2017 12:58 AM     Author:  Minoo Orlando, RT Service:  (none) Author Type:  Respiratory Therapist    Filed:  6/1/2017  1:01 AM Date of Service:  6/1/2017 12:58 AM Creation Time:  6/1/2017 12:58 AM    Status:  Signed :  Minoo Orlando RT (Respiratory Therapist)         Pt on BIPAP 10/5 30% throughout the night, with SATs in the upper 90's. BBS diminished, schedule Q6 nebs given. Will continue to follow.[HA1.1]  6/1/2017  Minoo Orlando[HA1.2]       Revision History        User Key Date/Time User Provider Type Action    > HA1.2 6/1/2017  1:01 AM Minoo Orlando, RT Respiratory Therapist Sign     HA1.1 6/1/2017 12:58 AM Minoo Orlando, RT Respiratory Therapist             Progress Notes by Marquita Castle, PT at 5/31/2017  6:16 PM     Author:  Marquita Castle PT Service:  (none) Author Type:  Physical Therapist    Filed:  5/31/2017  6:17 PM Date of Service:  5/31/2017  6:16 PM Creation Time:  5/31/2017  6:16 PM    Status:  Signed :  Marquita Castle PT (Physical Therapist)            05/31/17 1500   Quick Adds   Type of Visit Initial PT Evaluation   Living Environment   Lives With (nephew)   Living Arrangements house   Home Accessibility stairs within home;stairs to enter home   Transportation Available family or friend will provide;car   Self-Care   Activity/Exercise/Self-Care Comment Subjective difficult to obtain as pt with dry mouth, soft voice, mumbled speech.   Functional Level  "Prior   Ambulation 0-->independent   Transferring 0-->independent   Toileting 0-->independent   Bathing 0-->independent   Dressing 0-->independent   Eating 0-->independent   Communication 0-->understands/communicates without difficulty   Swallowing 0-->swallows foods/liquids without difficulty   Cognition 0 - no cognition issues reported   Prior Functional Level Comment Pt nods \"yes\" to questions of independence prior to admit with ADLs and mobility   General Information   Onset of Illness/Injury or Date of Surgery - Date 05/26/17   Referring Physician Bronson Vargas MD   Patient/Family Goals Statement None stated.   Pertinent History of Current Problem (include personal factors and/or comorbidities that impact the POC) 65 yo male found [place] a gas station, intubated in the field. 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 Difficult to discern baseline as pt with difficulty speaking, but does appear to be nodding \"yes\" and \"no \" appropriately.    Precautions/Limitations fall precautions;oxygen therapy device and L/min  (3L oxy mask)   General Observations rectal tube   Cognitive Status Examination   Orientation person   Level of Consciousness alert;lethargic/somnolent   Follows Commands and Answers Questions 100% of the time  (Pt follows, needs repeat of cues at times and slow process)   Cognitive Comment Not formally assessed defer to OT   Pain Assessment   Patient Currently in Pain No   Integumentary/Edema   Integumentary/Edema Comments Thin and frail inappearance   Posture    Posture Forward head position;Protracted shoulders;Kyphosis   Range of Motion (ROM)   ROM Comment R LE WLF with AAROM, PROM on the L WFL slight tone noted. L UE WFL PROM, R UE WFL    Strength   Strength Comments Significant weakness at the core and demonstrates minimal muscle contractions proximally at the hip and shouder on " "natalie L. No active muscle strength noted distally on natalie L. R UE and LE with at least antigravity strength.   Bed Mobility   Bed Mobility Comments Max A x 2 sup>sit    Transfer Skills   Transfer Comments Unable to stand secondary to BP and decreased posture/strength   Balance   Balance Comments Sitting balance rquires at least Moderate assist at the trunk, variable at time Max A x 2.   General Therapy Interventions   Planned Therapy Interventions balance training;bed mobility training;gait training;neuromuscular re-education;strengthening;ROM;stretching;transfer training;progressive activity/exercise   Clinical Impression   Criteria for Skilled Therapeutic Intervention yes, treatment indicated   PT Diagnosis Difficulty wth gait   Influenced by the following impairments L sided weakness, fatigue, decreased activity tolerance poor balance   Functional limitations due to impairments Decreaed functional independence   Clinical Presentation Stable/Uncomplicated   Clinical Decision Making (Complexity) Moderate complexity   Therapy Frequency` daily   Predicted Duration of Therapy Intervention (days/wks) 1 week   Anticipated Discharge Disposition Transitional Care Facility   Risk & Benefits of therapy have been explained Yes   Patient, Family & other staff in agreement with plan of care Yes   [facility] [employer] TM \"6 Clicks\"   2016, Trustees of [facility], under license to [employer].  All rights reserved.   6 Clicks Short Forms Basic Mobility Inpatient Short Form   [facility] [employer]  \"6 Clicks\" V.2 Basic Mobility Inpatient Short Form   1. Turning from your back to your side while in a flat bed without using bedrails? 2 - A Lot   2. Moving from lying on your back to sitting on the side of a flat bed without using bedrails? 2 - A Lot   3. Moving to and from a bed to a chair (including a wheelchair)? 1 - Total   4. Standing up from a chair using your arms (e.g., wheelchair, or bedside chair)? 1 - Total "   5. To walk in hospital room? 1 - Total   6. Climbing 3-5 steps with a railing? 1 - Total   Basic Mobility Raw Score (Score out of 24.Lower scores equate to lower levels of function) 8   Total Evaluation Time   Total Evaluation Time (Minutes) 10[SS1.1]        Revision History        User Key Date/Time User Provider Type Action    > SS1.1 5/31/2017  6:17 PM Marquita Castle, PT Physical Therapist Sign            Progress Notes by Serena De Guzman RT at 5/31/2017  1:51 PM     Author:  Serena De Guzman RT Service:  (none) Author Type:  Respiratory Therapist    Filed:  5/31/2017  2:07 PM Date of Service:  5/31/2017  1:51 PM Creation Time:  5/31/2017  1:51 PM    Status:  Signed :  Serena De Guzman RT (Respiratory Therapist)         Pt was audibly wheezy this am and given albuterol nebs X3, placed on bipap 10/5 10 50%. Pt has recovered with exacerbation and placed on 3L Oxy plus mask with clear/diminished breath sounds. Will continue to follow and give Q6 albuterol nebs.[RN1.1]     Revision History        User Key Date/Time User Provider Type Action    > RN1.1 5/31/2017  2:07 PM Serena De Guzman RT Respiratory Therapist Sign

## 2017-05-26 NOTE — IP AVS SNAPSHOT
"          [facility] [address] [facility]: [phone]                                              INTERAGENCY TRANSFER FORM - LAB / IMAGING / EKG / EMG RESULTS   2017                    Hospital Admission Date: 2017  TAMIKA GRIDER   : 1950  Sex: Male        Attending Provider: Lance Brandt MD     Allergies:  No Known Allergies    Infection:  None   Service:  ICU    Ht:  1.651 m (5' 5\")   Wt:  54.9 kg (121 lb 0.5 oz)   Admission Wt:  54.2 kg (119 lb 6.4 oz)    BMI:  20.14 kg/m 2   BSA:  1.59 m 2            Patient PCP Information     Provider PCP Type    None Doctor, MD General         Lab Results - 3 Days      Blood culture [[id number]]  Resulted: 17 0716, Result status: Final result    Ordering provider: Claudia Ashby APRN CNP  17 1525 Resulting lab: [facility]    Specimen Information    Type Source Collected On   Blood  17 1730          Components       Value Reference Range Flag Lab   Specimen Description Blood PICC Right   75   Special Requests Aerobic and anaerobic bottles received   FrStHsLb   Culture Micro No growth   75   Micro Report Status FINAL 2017   75            Blood culture [[id number]]  Resulted: 17 0716, Result status: Final result    Ordering provider: Elly Sullivan MD  17 1200 Resulting lab: [facility]    Specimen Information    Type Source Collected On     17 1200          Components       Value Reference Range Flag Lab   Specimen Description Blood Right Arm   FrStHsLb   Special Requests Aerobic and anaerobic bottles received   FrStHsLb   Culture Micro No growth   75   Micro Report Status FINAL 2017   75            Phosphorus (AM Draw) [[id number]]  Resulted: 17 0446, Result status: Final result    Ordering provider: Bronson Vargas MD  17 0000 Resulting lab: [facility]    Specimen Information    Type Source " Collected On   Blood  06/01/17 0425          Components       Value Reference Range Flag Lab   Phosphorus 3.9 2.5 - 4.5 mg/dL  FrStHsLb            Basic metabolic panel (AM Draw) [[id number]] (Abnormal)  Resulted: 06/01/17 0446, Result status: Final result    Ordering provider: Bronson Vargas MD  06/01/17 0000 Resulting lab: [facility]    Specimen Information    Type Source Collected On   Blood  06/01/17 0425          Components       Value Reference Range Flag Lab   Sodium 138 133 - 144 mmol/L  FrStHsLb   Potassium 4.5 3.4 - 5.3 mmol/L  FrStHsLb   Chloride 106 94 - 109 mmol/L  FrStHsLb   Carbon Dioxide 23 20 - 32 mmol/L  FrStHsLb   Anion Gap 9 3 - 14 mmol/L  FrStHsLb   Glucose 101 70 - 99 mg/dL H FrStHsLb   Urea Nitrogen 17 7 - 30 mg/dL  FrStHsLb   Creatinine 0.56 0.66 - 1.25 mg/dL L FrStHsLb   GFR Estimate -- >60 mL/min/1.7m2  FrStHsLb   Result:         >90  Non  GFR Calc     GFR Estimate If Black -- >60 mL/min/1.7m2  FrStHsLb   Result:         >90   GFR Calc     Calcium 8.6 8.5 - 10.1 mg/dL  FrStHsLb   Result:              Magnesium (AM Draw) [[id number]] (Abnormal)  Resulted: 06/01/17 0446, Result status: Final result    Ordering provider: Bronson Vargas MD  06/01/17 0000 Resulting lab: [facility]    Specimen Information    Type Source Collected On   Blood  06/01/17 0425          Components       Value Reference Range Flag Lab   Magnesium 2.5 1.6 - 2.3 mg/dL H FrStHsLb            Glucose by meter [[id number]]  Resulted: 06/01/17 0440, Result status: Final result    Ordering provider: Lance Brandt MD  06/01/17 0437 Resulting lab: POINT OF CARE TEST, GLUCOSE    Specimen Information    Type Source Collected On     06/01/17 0437          Components       Value Reference Range Flag Lab   Glucose 98 70 - 99 mg/dL  170            CBC (AM Draw) [[id number]] (Abnormal)  Resulted: 06/01/17 0433, Result status: Final result    Ordering provider:  Bronson Vargas MD  06/01/17 0000 Resulting lab: [facility]    Specimen Information    Type Source Collected On   Blood  06/01/17 0425          Components       Value Reference Range Flag Lab   WBC 8.9 4.0 - 11.0 10e9/L  FrStHsLb   RBC Count 4.10 4.4 - 5.9 10e12/L L FrStHsLb   Hemoglobin 12.6 13.3 - 17.7 g/dL L FrStHsLb   Hematocrit 36.6 40.0 - 53.0 % L FrStHsLb   MCV 89 78 - 100 fl  FrStHsLb   MCH 30.7 26.5 - 33.0 pg  FrStHsLb   MCHC 34.4 31.5 - 36.5 g/dL  FrStHsLb   RDW 15.1 10.0 - 15.0 % H FrStHsLb   Platelet Count 309 150 - 450 10e9/L  FrStHsLb            Vitamin B6 [[id number]] (Abnormal)  Resulted: 05/31/17 2258, Result status: Final result    Ordering provider: Lance Brandt MD  05/28/17 1717 Resulting lab: [facility]    Specimen Information    Type Source Collected On     05/28/17 1717          Components       Value Reference Range Flag Lab   Vitamin B6 10.6  L FrStHsLb   Comment:         Reference range: 20.0 to 125.0  Unit: nmol/L  (Note)  INTERPRETIVE INFORMATION: Vitamin B6 (Pyridoxal 5-Phosphate)  Pyridoxal 5'-phosphate measured in a specimen collected  following an 8-hour or overnight fast accurately indicates  vitamin B6 nutritional status. Non-fasting specimen  concentration reflects recent vitamin intake.  Test developed and characteristics determined by [employer]. See Compliance Statement B: [employer]/CS  Performed by [employer],  [address] [place] [address] [phone]  [email or web address], Negrito Mason MD, Lab. Director              Phenytoin free [[id number]]  Resulted: 05/31/17 1334, Result status: Final result    Ordering provider: Chelsea James MD  05/30/17 0000 Resulting lab: [facility]    Specimen Information    Type Source Collected On   Blood  05/30/17 0445          Components       Value Reference Range Flag Lab   Phenytoin Free 1.35   FrStHsLb   Comment:         Analysis performed by [facility]  [employer] [place] [address]  Reference range: 1.00  to  2.00  Unit: ug/ml              Magnesium RBC [[id number]]  Resulted: 05/31/17 0809, Result status: Final result    Ordering provider: Lance Brandt MD  05/28/17 1625 Resulting lab: [facility]    Specimen Information    Type Source Collected On   Blood  05/28/17 1700          Components       Value Reference Range Flag Lab   Magnesium RBC 2.6   FrStHsLb   Comment:         Reference range: 1.5 to 3.1  Unit: mmol/L  (Note)  INTERPRETIVE INFORMATION: MG RBC  RBC magnesium results reflect the intracellular stores and  general homeostasis of magnesium. Results may be falsely  low if RBCs in the submitted specimen are lysed or not  promptly  from plasma. To convert to mg/dL,  multiply mmol/L by 2.43.  Test developed and characteristics determined by [employer]. See Compliance Statement B: [employer]/CS  Performed by [employer],  [address] [place] [address] [phone]  [email or web address], Negrito Mason MD, Lab. Director              Blood gas venous with oxyhemoglobin (AM Draw) [[id number]] (Abnormal)  Resulted: 05/31/17 0755, Result status: Final result    Ordering provider: Bronson Vargas MD  05/31/17 0728 Resulting lab: [facility]    Specimen Information    Type Source Collected On   Blood  05/31/17 0735          Components       Value Reference Range Flag Lab   Ph Venous 7.29 7.32 - 7.43 pH L FrStHsLb   PCO2 Venous 41 40 - 50 mm Hg  FrStHsLb   PO2 Venous 44 25 - 47 mm Hg  FrStHsLb   Bicarbonate Venous 20 21 - 28 mmol/L L FrStHsLb   Oxyhemoglobin Venous 72 %  FrStHsLb   Base Deficit Venous 6.4 mmol/L  FrStHsLb   Comment:  Reference range:  -7.7 to 1.9            Basic metabolic panel (AM Draw) [[id number]] (Abnormal)  Resulted: 05/31/17 0511, Result status: Final result    Ordering provider: Bronson Vargas MD  05/31/17 0001 Resulting lab: [facility]    Specimen  Information    Type Source Collected On   Blood  05/31/17 0440          Components       Value Reference Range Flag Lab   Sodium 140 133 - 144 mmol/L  FrStHsLb   Potassium 3.8 3.4 - 5.3 mmol/L  FrStHsLb   Chloride 107 94 - 109 mmol/L  FrStHsLb   Carbon Dioxide 20 20 - 32 mmol/L  FrStHsLb   Anion Gap 13 3 - 14 mmol/L  FrStHsLb   Glucose 62 70 - 99 mg/dL L FrStHsLb   Urea Nitrogen 10 7 - 30 mg/dL  FrStHsLb   Creatinine 0.61 0.66 - 1.25 mg/dL L FrStHsLb   GFR Estimate -- >60 mL/min/1.7m2  FrStHsLb   Result:         >90  Non  GFR Calc     GFR Estimate If Black -- >60 mL/min/1.7m2  FrStHsLb   Result:         >90   GFR Calc     Calcium 8.4 8.5 - 10.1 mg/dL L FrStHsLb   Result:              Magnesium (AM Draw) [[id number]]  Resulted: 05/31/17 0511, Result status: Final result    Ordering provider: Bronson Vargas MD  05/31/17 0001 Resulting lab: [facility]    Specimen Information    Type Source Collected On   Blood  05/31/17 0440          Components       Value Reference Range Flag Lab   Magnesium 1.8 1.6 - 2.3 mg/dL  FrStHsLb            Phosphorus (AM Draw) [[id number]]  Resulted: 05/31/17 0511, Result status: Final result    Ordering provider: Bronson Vargas MD  05/31/17 0001 Resulting lab: [facility]    Specimen Information    Type Source Collected On   Blood  05/31/17 0440          Components       Value Reference Range Flag Lab   Phosphorus 3.3 2.5 - 4.5 mg/dL  FrStHsLb            CBC (AM Draw) [[id number]] (Abnormal)  Resulted: 05/31/17 0456, Result status: Final result    Ordering provider: Bronson Vargas MD  05/31/17 0001 Resulting lab: [facility]    Specimen Information    Type Source Collected On   Blood  05/31/17 0440          Components       Value Reference Range Flag Lab   WBC 13.8 4.0 - 11.0 10e9/L H FrStHsLb   RBC Count 3.87 4.4 - 5.9 10e12/L L FrStHsLb   Hemoglobin 11.8 13.3 - 17.7 g/dL L FrStHsLb   Hematocrit 34.7 40.0 -  53.0 % L FrStHsLb   MCV 90 78 - 100 fl  FrStHsLb   MCH 30.5 26.5 - 33.0 pg  FrStHsLb   MCHC 34.0 31.5 - 36.5 g/dL  FrStHsLb   RDW 15.6 10.0 - 15.0 % H FrStHsLb   Platelet Count 271 150 - 450 10e9/L  FrStHsLb            Testing Performed By     Lab - Abbreviation Name Director Address Valid Date Range    14 - FrStHsLb [facility] Unknown [address] [place] [address] 05/08/15 1057 - Present    75 - Unknown [facility] Unknown [address] [facility] [address] 01/15/15 1019 - Present    170 - Unknown POINT OF CARE TEST, GLUCOSE Unknown Unknown 10/31/11 1114 - Present            Unresulted Labs     None         ECG/EMG Results      Echo Complete with Lumason [[id number]]  Resulted: 17 0753, Result status: Edited Result - FINAL    Ordering provider: Claudia Ashby APRN CNP  17 1712 Resulted by: Hernan Jesus MD    Performed: 17 0859 - 17 0900 Resulting lab: [facility]    Narrative:       [id number]  [facility]  [id number]  [id number]^DANIELLE^CLAUDIA^KAEL           [facility]  Echocardiography Laboratory  [address] [place] [address]        Name: TAMIKA GRIDER  MRN: [id number]  : 1950  Study Date: 2017 07:53 AM  Age: 66 yrs  Gender: Male  Patient Location: [facility]  Reason For Study: Shock  Ordering Physician: CLAUDIA ASHBY  Referring Physician: CLAUDIA ASHBY  Performed By: Denise Menjivar RDCS     BSA: 1.6 m2  Height: 65 in  Weight: 119 lb  HR: 88  BP: 117/57 mmHg  _____________________________________________________________________________  __        Procedure  Complete Echo Adult. Contrast Lumason.  _____________________________________________________________________________  __        Interpretation Summary     The visual ejection fraction is estimated at 60-65%.  No regional wall motion abnormalities noted.  The study was technically difficult. Limited views were  obtained.  _____________________________________________________________________________  __        Left Ventricle  The left ventricle is normal in size. There is normal left ventricular wall  thickness. The visual ejection fraction is estimated at 60-65%. E by E prime  ratio is less than 8, that likely suggests normal left ventricular filling  pressures. No regional wall motion abnormalities noted.     Right Ventricle  The right ventricle is normal in size and function.     Atria  The left atrium is not well visualized. Right atrial size is normal. There is  no atrial shunt seen. The atrial septum is aneurysmal.     Mitral Valve  The mitral valve is normal in structure and function. There is trace mitral  regurgitation.        Tricuspid Valve  The tricuspid valve is not well visualized, but is grossly normal.     Aortic Valve  The aortic valve is trileaflet. No aortic regurgitation is present. No  hemodynamically significant valvular aortic stenosis.     Pulmonic Valve  The pulmonic valve is not well visualized.     Vessels  The aortic root is normal size. The inferior vena cava is not dilated.     Pericardium  There is no pericardial effusion.        Rhythm  Probable SR.  _____________________________________________________________________________  __  MMode/2D Measurements & Calculations  IVSd: 0.74 cm     LVIDd: 3.9 cm  LVIDs: 3.0 cm  LVPWd: 0.64 cm  FS: 23.7 %  EDV(Teich): 67.8 ml  ESV(Teich): 35.3 ml  LV mass(C)d: 75.4 grams  LV mass(C)dI: 47.5 grams/m2  Ao root diam: 3.7 cm  LA dimension: 2.7 cm  LA/Ao: 0.73  LVOT diam: 2.4 cm  LVOT area: 4.4 cm2        Doppler Measurements & Calculations  MV E max iveth: 67.9 cm/sec  MV A max iveth: 52.4 cm/sec  MV E/A: 1.3  MV dec time: 0.23 sec  TR max iveth: 233.7 cm/sec  TR max P.8 mmHg  Lateral E/e': 6.6  Medial E/e': 9.2              _____________________________________________________________________________  __        Report approved by: Hernan Medina 2017  01:37 PM       1    Type Source Collected On     05/27/17 0753          View Image (below)        Echocardiogram Complete [[id number]]  Resulted: 05/27/17 0859, Result status: In process    Ordering provider: Claudia Ashby APRN CNP  05/26/17 1712 Performed: 05/27/17 0859 - 05/27/17 0859    Resulting lab: [facility]                   Encounter-Level Documents:     There are no encounter-level documents.      Order-Level Documents:     There are no order-level documents.

## 2017-05-26 NOTE — IP AVS SNAPSHOT
"[facility] [address]: [phone]                                              INTERAGENCY TRANSFER FORM - PHYSICIAN ORDERS   2017                    Hospital Admission Date: 2017  TAMIKA GRIDER   : 1950  Sex: Male        Attending Provider: Lance Brandt MD     Allergies:  No Known Allergies    Infection:  None   Service:  ICU    Ht:  1.651 m (5' 5\")   Wt:  54.9 kg (121 lb 0.5 oz)   Admission Wt:  54.2 kg (119 lb 6.4 oz)    BMI:  20.14 kg/m 2   BSA:  1.59 m 2            Patient PCP Information     Provider PCP Type    None Doctor, MD General      ED Clinical Impression     Diagnosis Description Comment Added By Time Added    Left-sided weakness [M62.81] Left-sided weakness [M62.81]  Elly Sullivan MD 2017 11:58 AM      Hospital Problems as of 6/3/2017              Priority Class Noted POA    Altered mental status, unspecified altered mental status type Medium  2017 Yes    Cerebrovascular accident (H) Medium  2017 Yes      Non-Hospital Problems as of 6/3/2017              Priority Class Noted    ACP (advance care planning) Medium  2017      Code Status History     Date Active Date Inactive Code Status Order ID Comments User Context    6/3/2017 11:50 AM 6/3/2017 12:07 PM DNR/DNI [id number]  Foster Rosa MD Outpatient    2017  9:54 AM 6/3/2017 11:50 AM DNR/DNI [id number] Code status discussion is appropriately documented in the chart. Lance Brandt MD Inpatient    2017 12:24 PM 2017  9:53 AM Full Code [id number]  Lance Brandt MD Inpatient    2017  4:19 PM 2017 12:24 PM DNR [id number]  Claudia Ashby APRN CNP Inpatient    2017  1:44 PM 2017  4:19 PM Full Code [id number]  Claudia Ashby APRN CNP Inpatient         Medication Review      START taking        Dose / Directions Comments    acetaminophen 325 MG tablet   Commonly known as:  TYLENOL        Dose:  650 mg   Take 2 tablets (650 " mg) by mouth every 4 hours as needed for mild pain   Quantity:  100 tablet   Refills:  0        atropine 1 % ophthalmic solution        Dose:  1-2 drop   Place 1-2 drops under the tongue every hour as needed for other (secretions)   Quantity:  1 Bottle   Refills:  0        bisacodyl 10 MG Suppository   Commonly known as:  DULCOLAX   Used for:  Drug-induced constipation        Dose:  10 mg   Start taking on:  6/4/2017   Place 1 suppository (10 mg) rectally once as needed for other (for no bowel movement for 72 hours)   Quantity:  30 suppository   Refills:  0        haloperidol 2 MG/ML (HIGH CONC) solution   Commonly known as:  HALDOL        Dose:  0.5-1 mg   Take 0.25-0.5 mLs (0.5-1 mg) by mouth every 6 hours as needed for agitation or other (nausea)   Quantity:  120 mL   Refills:  0        LORazepam 0.5 MG tablet   Commonly known as:  ATIVAN        Dose:  0.5-1 mg   Place 1-2 tablets (0.5-1 mg) under the tongue every 3 hours as needed   Quantity:  90 tablet   Refills:  0    For air hunger, anxiety or nausea.       morphine sulfate HIGH CONCENTRATE 100 MG/5ML (HIGH CONC) solution   Commonly known as:  ROXANOL *CONCENTRATED*        Dose:  5-10 mg   Place 0.25-0.5 mLs (5-10 mg) under the tongue every 2 hours as needed for moderate to severe pain or other (or dyspnea)   Quantity:  240 mL   Refills:  0    - 5 mg for pain level 1-5    - 10 mg for pain level 6-10       nicotine 21 MG/24HR 24 hr patch   Commonly known as:  NICODERM CQ   Used for:  Cigarette nicotine dependence with nicotine-induced disorder        Dose:  1 patch   Place 1 patch onto the skin daily   Quantity:  28 patch   Refills:  0        nystatin 592142 UNIT/ML suspension   Commonly known as:  MYCOSTATIN   Used for:  Thrush        Dose:  695385 Units   Take 5 mLs (500,000 Units) by mouth 4 times daily   Quantity:  240 mL   Refills:  0    Continue until oral thrush clears       senna-docusate 8.6-50 MG per tablet   Commonly known as:  SENOKOT-S;PERICOLACE    Used for:  Drug-induced constipation        Dose:  1 tablet   Take 1 tablet by mouth 2 times daily as needed for constipation   Quantity:  60 tablet   Refills:  0          STOP taking     NONFORMULARY                     Further instructions from your care team       Pt to d/c to [facility] (P: [phone], F: [phone])  With hospice at 1400 via HE.     Summary of Visit     Reason for your hospital stay       CVA             After Care     Activity - Up with assistive device           Additional Discharge Instructions       Discharge to hospice.       Advance Diet as Tolerated       Follow this diet upon discharge: Orders Placed This Encounter      Regular Diet Adult       Diaz catheter       To straight gravity drainage. Change catheter every 2 weeks and PRN for leaking or decreased uring output with signs of bladder distention. DO NOT change catheter without a specific MD order IF diagnosis of benign prostatic hypertrophy (BPH), neurogenic bladder, or other urological conditions       General info for SNF       Length of Stay Estimate: Short Term Care: Estimated # of Days 31-90  Condition at Discharge: Stable  Level of care:board and care  Rehabilitation Potential: Poor  Admission H&P remains valid and up-to-date: Yes  Recent Chemotherapy: N/A  Use Nursing Home Standing Orders: Yes       Mantoux instructions       Give two-step Mantoux (PPD) Per Facility Policy Yes             Statement of Approval     Ordered          06/03/17 4438  I have reviewed and agree with all the recommendations and orders detailed in this document.  EFFECTIVE NOW     Approved and electronically signed by:  Foster Rosa MD

## 2017-05-26 NOTE — CONSULTS
[facility]  [facility]    NeuroCritical Care Consultation Note     Jair Fernández MRN# [id number]   YOB: 1950 Age: 66 year old    Code Status:No Order   Date of Admission: 5/26/2017  Date of Consult: 05/26/2017    _________________________________   Primary Care Physician   None Doctor  ______________________________________________         Assessment & Plan   ______________________________________________  (G40.909) Recurrent seizures (H)  --received 4 mg of Ativan  --Will load with fosphenytoin  --MRI brain is negative, will get stat EEG  -------May be in status epilepticus.   (I63.9) Cerebrovascular accident (CVA), unspecified mechanism (H)  (primary encounter diagnosis)  --No acute findings  --Previous multiple strokes  ------poor IV tPA candidate   ---Recommend urine drug test  ---MRI brain - no stroke  --Right ICA occlusion is likely chronic, given chronic right sided strokes  (J96.01) Acute respiratory failure with hypoxia (H)  --Intubated for airway protection  --Management per medical intensivists.   #. DVT Prophylaxis  --Mechanical only  #. PT/OT/Speech  --Start  evaluations  #. Nutrition / GI Prophylaxis  --Per recommendations of speech therapy      #. Code Status: Full Code     TIME:   Neurocritical care time:  60 minutes for evaluation and management of code stroke evaluation. Patient is critically ill and has a very high risk of deterioration  ----------------------------------------------------------------------------------  ----------------------------------------------------------------------------------  Reason for consult: Code stroke    Chief Complaint   ______________________________________________  Left sided weakness  History is obtained from the patient    History of Present Illness   ______________________________________________  Jair AL Pradeep is a 66 year old male who presents with left sided weakness and decreased mental status  "about 9:30 while at gas station. Patient was normal prior to that. Nothing else is knows about this patient. He was not able to handle his secretions and was emergently intubated. Patient may have had a seizure prior to intubation and received 4 mg of Ativan.   MRI brain did not show any acute change.   Past Medical History    ______________________________________________  Unknown  Past Surgical History   ______________________________________________  Unknown  Prior to Admission Medications   ______________________________________________  None     Allergies   No Known Allergies    Social History   ______________________________________________    Unknown    Family History   ______________________________________________  Reviewed and not felt to be contributory.     Review of Systems   ______________________________________________  Review of systems is not obtainable due to patient factors - intubation      Physical Exam   ______________________________________________  Weight:119 lbs 6.4 oz; Height:5' 5\"  Temp: 97.1  F (36.2  C) Temp src: Temporal BP: 141/90 Pulse: 81 Heart Rate: 81 Resp: (!) 34 SpO2: 97 % O2 Device: None (Room air)    General Appearance:  No acute distress  Neuro:       Mental Status Exam:  Somnolent, slurred speech, not much speech. Mental status decreased       Cranial Nerves:  Pupils 3 mm,  reactive. Occulocephalis reflexes are present.  Corneal reflexes present. Face - left facial droop Gag/Cough reflex present. Other CN are untestable           Motor:  Left sided weakness       Reflexes:  Low DTR, toes equivocal       Sensory:  Untestable                    Coordination:   Untestable       Gait:  Untestable  Neck: no nuchal rigidity, normal thyroid. No carotid bruits.    Cardiovascular: Regular rate and rhythm, no m/r/g  Lungs: Clear to auscultation  Abdomen: Soft, not tender, not distended  Extremities: No clubbing, no cyanosis, no edema    Data "   ______________________________________________  All Data personally reviewed:       Labs:   CBC RESULTS:       Recent Labs  Lab 05/26/17  1200   WBC 12.2*   RBC 3.90*   HGB 11.8*   HCT 35.8*        Basic Metabolic Panel:   No lab results found.  Liver panel:  No lab results found.  INR:No lab results found.   Lipid Profile:No lab results found.  Thyroid Panel:No lab results found.   Vitamin B12: No lab results found.   Vitamin D level: No lab results found.  A1C: No lab results found.  Troponin I: No lab results found.  Ammonia: No lab results found.  CK: No lab results found.     CRP inflammation: No lab results found.  ESR: No lab results found.    LAUREL: No lab results found.    ANCA: No lab results found.   Drug Screen: No lab results found.  Alcohol level:No lab results found.  UA Results:  No lab results found.  Most Recent 6 Bacteria Isolates From Any Culture (See EPIC Reports for Culture Details):No lab results found.     Cardiac US:   ---           Imaging:   All imaging studies were reviewed personally  CT head:   Chronic small infarcts in the right cerebral hemisphere as  detailed above. Diffuse cerebral volume loss and cerebral white matter  changes consistent with chronic small vessel ischemic disease. No  evidence for acute intracranial pathology  CTA neck/head:  1. Age-indeterminate complete occlusion of the right internal carotid  artery from the origin to the supraclinoid segment. Given chronic  appearing right cerebral hemisphere infarcts, there is likely  long-standing disease in the right internal carotid artery although  the time of occlusion cannot be determined.  2. Minimal calcified atherosclerotic plaque at the origin of the left  internal carotid artery that does not result in stenosis.  3. Otherwise, normal neck and head CTA.    CTP:  There is delayed arrival of the contrast bolus to the  right anterior cerebral and right middle cerebral artery territories  consistent with the known  right internal carotid artery occlusion.  This results in mildly decreased cerebral blood flow but increased  cerebral blood volume to the right KELIN and right MCA territories  suggesting vasodilatation of the right KELIN and right MCA territories  to accommodate for decreased cerebral blood flow. There are no  findings to suggest acute ischemia or infarct. Perfusion defects  corresponding to chronic right frontal and right parietal infarcts are  noted. No other perfusion abnormalities.  MRI brain:   No acute stroke

## 2017-05-26 NOTE — ED PROVIDER NOTES
"  History     Chief Complaint:  Cerebrovascular Accident    HPI   Jair Fernández is a 66 year old male who presents to the emergency department for evaluation of a cerebrovascular accident. The EMS reported that the patient was found unresponsive outside on the ground of a local [employer] at approximately 0945. Per EMS, the patient has left sided deficits and left sided weakness. The patient was also hypotension with systolic pressures in the 70s prior to the 250 NS Bolus administered, which raised systolic pressures to the 90s. EMS also noted the patient's blood glucose was 163. The patient was unresponsive in the ambulance to EMS questions, but was able to give few slurred words in the ED. The patient also vomited in the ED and did not speak after.     Allergies:  No Known Drug Allergies      Medications:    The patient is not currently taking any prescribed medications.     Past Medical History:    The patient denies any relevant past medical history.     Past Surgical History:    History reviewed. No pertinent past surgical history.     Family History:    The patient denies any relevant family medical history.     Social History:  The patient was accompanied to the ED by EMS.  Smoking Status: N/A  Smokeless Tobacco: N/A  Alcohol Use: N/A      Review of Systems   Unable to perform ROS: Patient unresponsive     Physical Exam   First Vitals:  Patient Vitals for the past 24 hrs:   BP Temp Temp src Pulse Heart Rate Resp SpO2 Height Weight   05/26/17 1156 - - - - - - 95 % - -   05/26/17 1155 126/78 - - - - - - - -   05/26/17 1151 - - - - - - 100 % - -   05/26/17 1150 140/79 - - - - - 100 % - -   05/26/17 1129 141/90 97.1  F (36.2  C) Temporal 81 81 (!) 34 100 % 1.651 m (5' 5\") 54.2 kg (119 lb 6.4 oz)   05/26/17 1126 141/90 - - - 80 9 99 % - -   05/26/17 1125 - - - - 53 14 100 % - -   05/26/17 1121 - - - - - - 94 % - -   05/26/17 1119 - - - - - - 97 % - -         Physical Exam  General: Resting on the gurney, " left-sided flaccid paralysis.    Facial symmetry but unable to fully assess. Secondary to dificulty to following   commands. Subsequent act of vomiting without ability to protect his airway.  Head:  The scalp, face, and head appear normal  Mouth/Throat: Mucus membranes are moist  CV:  Regular rate    Normal S1 and S2  No pathological murmur   Resp:  Breath sounds clear and equal bilaterally    Non-labored, no retractions or accessory muscle use    No coarseness    No wheezing   GI:  Abdomen is soft, no rigidity    No tenderness to palpation  MS:  Normal motor assessment of all extremities.    Good capillary refill noted.  Skin:  No skin rashes or lesions.No evidence of trauma  Neuro: Nearly aphasic with fixed speech.  Psych:  Unable to assess  Emergency Department Course     Imaging:  Radiology findings were communicated with the Admitting MD who voiced understanding of the findings.  MR Brain w/o Contrast:  IMPRESSION:    1. Probable recent although not definitely acute border zone infarcts  in the high right cerebral hemisphere.  2. No definite acute infarcts noted.  3. Diffuse cerebral volume loss and cerebral white matter changes  consistent with chronic small vessel ischemic disease.  Reading per radiology.     CTA Angiogram Head Neck:  IMPRESSION:  1. Age-indeterminate complete occlusion of the right internal carotid  artery from the origin to the supraclinoid segment. Given chronic  appearing right cerebral hemisphere infarcts, there is likely  long-standing disease in the right internal carotid artery although  the time of occlusion cannot be determined.  2. Minimal calcified atherosclerotic plaque at the origin of the left  internal carotid artery that does not result in stenosis.  3. Otherwise, normal neck and head CTA.  Reading per radiology.    CT Head w/ Contrast:  IMPRESSION: There is delayed arrival of the contrast bolus to the  right anterior cerebral and right middle cerebral artery  territories  consistent with the known right internal carotid artery occlusion.  This results in mildly decreased cerebral blood flow but increased  cerebral blood volume to the right KELIN and right MCA territories  suggesting vasodilatation of the right KELIN and right MCA territories  to accommodate for decreased cerebral blood flow. There are no  findings to suggest acute ischemia or infarct. Perfusion defects  corresponding to chronic right frontal and right parietal infarcts are  noted. No other perfusion abnormalities.  Reading per radiology.     CT Head w/o Contrast:  IMPRESSION: Chronic small infarcts in the right cerebral hemisphere as  detailed above. Diffuse cerebral volume loss and cerebral white matter  changes consistent with chronic small vessel ischemic disease. No  evidence for acute intracranial pathology.  Reading per radiology.     Laboratory:  Laboratory findings were communicated with the Admitting MD who voiced understanding of the findings.  CBC: WBC 12.2 (H), HGB 11.8 (L) o/w WNL ()   BMP: Glucose 156 (H), Calcium 7.7 (L) o/w WNL (Creatinine 1.20)   INR: 1.10   Lactic Acid: 3.6 (H)  Partial thromboplastin time: 28  Blood Culture: AWNL  Blood Gas Arterial and Oxyhgb: pH: 7.34, PCO2 aterial: 39, PO2 arterial: 151 (H), Bicarbonate arterial: 21, FlO2: 40%, Oxyhemoglobin arterial: 97, Base Deficit Art: 3.9     Interventions:  1121 Zofran 4 mg IV  1124 Anectine 150 mg IV  1125 Ativan 4 mg IV  1125 Amidate 20 mg IV  1131 NaCl Bolus 1000 mL IV  1140 Versed 2 mg IV  1140 Ativan 2 mg IV  1147 Isovue-370 120 mL IV  1157 Diprivan 20 mg/ml IV  1200 Diprivan 10 mcg/kg/min IV  1216 NaCl Bolus 1000 mL IV     Emergency Department Course:  Nursing notes and vitals reviewed.  I performed an exam of the patient as documented above.   IV was inserted and blood was drawn for laboratory testing, results above.  The patient was sent for a CT head, CTA head and neck, MR brain while in the emergency department,  results above.      1115 EMS Arrival  1116 Code stroke called  1117 Dr. Calzada arrived to the ED to evaluate the patient.  1118 Normal Saline 2000 mL IV   1120  20 etomidate ordered   150 of succinylcholine ordered  1124 20 of etomidate administered  1125 150 of succinylcholine administered   Patient intubated and bagged  1126  4 of Ativan administered  1127  Chest x-ray and EKG ordered - delayed/deffered in order to go to CT.  1131  Patient to CT  1137  2 mg of versed administered  1140 2 mg of Ativan administered  1154 Propofol drip administered   Normal Saline 1000 mL IV   1159 Patient left for MRI    I discussed the treatment plan with the patient. They expressed understanding of this plan and consented to admission. I discussed the patient with Dr. Vidales, who will admit the patient to a monitored bed for further evaluation and treatment.     I personally reviewed the laboratory results with the Patient and answered all related questions prior to admission.    Impression & Plan      Medical Decision Making:  The patient presents for evaluation of sudden left sided weakness. He did have evidence of a seizure in the emergency department. Initial concern was for intercranial hemorrhage. CT was obtained and was negative. The patient was being evaluated by Dr. Calzada who wanted him to go immediately to MRI and then be admitted to the ICU. Patient was intubated prior to obtaining CT, secondary to vomiting, concerned for ability to protect his airway as he was largely aphasic with left-sided jacey-paresis and facial droop. He did have ongoing evidence of seizure activity despite very high doses of benzodiazepines. Phenytoin was ordered by Dr. Calzada. Patient was admitted to the ICU in critical condition.    Critical Care time was 90 minutes for this patient excluding procedures.     Diagnosis:    ICD-10-CM    1. Cerebrovascular accident (CVA), unspecified mechanism (H) I63.9 Blood gas arterial and oxyhgb     Basic  metabolic panel     Basic metabolic panel     CBC with platelets differential     CBC with platelets differential     INR     INR     Lactic acid whole blood     Lactic acid whole blood     Partial thromboplastin time     Partial thromboplastin time     Blood culture     Blood culture     Glucose by meter     Glucose by meter   2. Acute respiratory failure with hypoxia (H) J96.01    3. Recurrent seizures (H) G40.909    4. Left-sided weakness M62.81       Disposition:   Admission    Scribe Disclosure:  Zee BARRETO, am serving as a scribe at 11:19 AM on 5/26/2017 to document services personally performed by MD Nate,  based on my observations and the provider's statements to me. 5/26/2017    EMERGENCY DEPARTMENT       Elly Sullivan MD  05/27/17 [id number]

## 2017-05-26 NOTE — ED NOTES
Bed: ST01  Expected date:   Expected time:   Means of arrival:   Comments:  millicent - 516 - 55 weakness eta 1114

## 2017-05-26 NOTE — PROGRESS NOTES
ETT tube was withdraw 2cm H20 per MD order and ETT tube is currently at 22cm at the lip.  5/26/2017  Nazanin Lindquist

## 2017-05-26 NOTE — IP AVS SNAPSHOT
MRN:[id number]                      After Visit Summary   5/26/2017    Jair Fernández    MRN: [id number]           Thank you!     Thank you for choosing [facility] for your care. Our goal is always to provide you with excellent care. Hearing back from our patients is one way we can continue to improve our services. Please take a few minutes to complete the written survey that you may receive in the mail after you visit with us. Thank you!        Patient Information     Date Of Birth          1950        Designated Caregiver       Most Recent Value    Caregiver    Will someone help with your care after discharge? no      About your hospital stay     You were admitted on:  May 26, 2017 You last received care in the:  [facility] [address] [facility]    You were discharged on:  Fela 3, 2017        Reason for your hospital stay       CVA                  Who to Call     For medical emergencies, please call 911.  For non-urgent questions about your medical care, please call your primary care provider or clinic, None          Attending Provider     Provider Specialty    Elly Sullivan MD Emergency Medicine    Helio Vidales MD Anesthesiology    AGAPITOLance gandara MD Neurology       Primary Care Provider    None Doctor, MD      After Care Instructions     Activity - Up with assistive device           Additional Discharge Instructions       Discharge to hospice.            Advance Diet as Tolerated       Follow this diet upon discharge: Orders Placed This Encounter      Regular Diet Adult            Diaz catheter       To straight gravity drainage. Change catheter every 2 weeks and PRN for leaking or decreased uring output with signs of bladder distention. DO NOT change catheter without a specific MD order IF diagnosis of benign prostatic hypertrophy (BPH), neurogenic bladder, or other urological conditions            General info for SNF       Length of Stay Estimate: Long term  "care  Condition at Discharge: Stable  Level of care:board and care  Rehabilitation Potential: Poor  Admission H&P remains valid and up-to-date: Yes  Recent Chemotherapy: N/A  Use Nursing Home Standing Orders: Yes            Mantoux instructions       Give two-step Mantoux (PPD) Per Facility Policy Yes                  Further instructions from your care team       Pt to d/c to [facility] (P: [phone], F: [phone])  With hospice at 1400 via HE.     Pending Results     No orders found from 2017 to 2017.            Statement of Approval     Ordered          17 1151  I have reviewed and agree with all the recommendations and orders detailed in this document.  EFFECTIVE NOW     Approved and electronically signed by:  Foster Rosa MD             Admission Information     Date & Time Provider Department Dept. Phone    2017 Lance Brandt MD [address] [facility] [phone]      Your Vitals Were     Blood Pressure Pulse Temperature Respirations Height Weight    130/89 0 98.3  F (36.8  C) (Oral) 16 1.651 m (5' 5\") 54.9 kg (121 lb 0.5 oz)    Pulse Oximetry BMI (Body Mass Index)                92% 20.14 kg/m2          SmartRx Information     SmartRx lets you send messages to your doctor, view your test results, renew your prescriptions, schedule appointments and more. To sign up, go to [email or web address] . Click on \"Log in\" on the left side of the screen, which will take you to the Welcome page. Then click on \"Sign up Now\" on the right side of the page.     You will be asked to enter the access code listed below, as well as some personal information. Please follow the directions to create your username and password.     Your access code is: [email or web address]  Expires: 2017 10:03 AM     Your access code will  in 90 days. If you need help or a new code, please call your [place] clinic or [phone].        Care EveryWhere ID     This is your Care " EveryWhere ID. This could be used by other organizations to access your [facility] medical records  [id number]           Review of your medicines      START taking        Dose / Directions    acetaminophen 325 MG tablet   Commonly known as:  TYLENOL        Dose:  650 mg   Take 2 tablets (650 mg) by mouth every 4 hours as needed for mild pain   Quantity:  100 tablet   Refills:  0       atropine 1 % ophthalmic solution        Dose:  1-2 drop   Place 1-2 drops under the tongue every hour as needed for other (secretions)   Quantity:  1 Bottle   Refills:  0       bisacodyl 10 MG Suppository   Commonly known as:  DULCOLAX   Used for:  Drug-induced constipation        Dose:  10 mg   Start taking on:  6/4/2017   Place 1 suppository (10 mg) rectally once as needed for other (for no bowel movement for 72 hours)   Quantity:  30 suppository   Refills:  0       haloperidol 2 MG/ML (HIGH CONC) solution   Commonly known as:  HALDOL        Dose:  0.5-1 mg   Take 0.25-0.5 mLs (0.5-1 mg) by mouth every 6 hours as needed for agitation or other (nausea)   Quantity:  120 mL   Refills:  0       LORazepam 0.5 MG tablet   Commonly known as:  ATIVAN        Dose:  0.5-1 mg   Place 1-2 tablets (0.5-1 mg) under the tongue every 3 hours as needed   Quantity:  90 tablet   Refills:  0       morphine sulfate HIGH CONCENTRATE 100 MG/5ML (HIGH CONC) solution   Commonly known as:  ROXANOL *CONCENTRATED*        Dose:  5-10 mg   Place 0.25-0.5 mLs (5-10 mg) under the tongue every 2 hours as needed for moderate to severe pain or other (or dyspnea)   Quantity:  240 mL   Refills:  0       nicotine 21 MG/24HR 24 hr patch   Commonly known as:  NICODERM CQ   Used for:  Cigarette nicotine dependence with nicotine-induced disorder        Dose:  1 patch   Place 1 patch onto the skin daily   Quantity:  28 patch   Refills:  0       nystatin 404222 UNIT/ML suspension   Commonly known as:  MYCOSTATIN   Used for:  Thrush        Dose:  915019 Units   Take 5 mLs  (500,000 Units) by mouth 4 times daily   Quantity:  240 mL   Refills:  0       senna-docusate 8.6-50 MG per tablet   Commonly known as:  SENOKOT-S;PERICOLACE   Used for:  Drug-induced constipation        Dose:  1 tablet   Take 1 tablet by mouth 2 times daily as needed for constipation   Quantity:  60 tablet   Refills:  0         STOP taking     NONFORMULARY                Where to get your medicines      Some of these will need a paper prescription and others can be bought over the counter. Ask your nurse if you have questions.     Bring a paper prescription for each of these medications     atropine 1 % ophthalmic solution    bisacodyl 10 MG Suppository    haloperidol 2 MG/ML (HIGH CONC) solution    LORazepam 0.5 MG tablet    morphine sulfate HIGH CONCENTRATE 100 MG/5ML (HIGH CONC) solution    nicotine 21 MG/24HR 24 hr patch    nystatin [id number] UNIT/ML suspension       You don't need a prescription for these medications     acetaminophen 325 MG tablet    senna-docusate 8.6-50 MG per tablet                Protect others around you: Learn how to safely use, store and throw away your medicines at www.disposemymeds.org.             Medication List: This is a list of all your medications and when to take them. Check marks below indicate your daily home schedule. Keep this list as a reference.      Medications           Morning Afternoon Evening Bedtime As Needed    acetaminophen 325 MG tablet   Commonly known as:  TYLENOL   Take 2 tablets (650 mg) by mouth every 4 hours as needed for mild pain   Last time this was given:  650 mg on 6/1/2017 10:22 PM                                atropine 1 % ophthalmic solution   Place 1-2 drops under the tongue every hour as needed for other (secretions)                                bisacodyl 10 MG Suppository   Commonly known as:  DULCOLAX   Place 1 suppository (10 mg) rectally once as needed for other (for no bowel movement for 72 hours)   Start taking on:  6/4/2017   Last time  this was given:  10 mg on 5/30/2017  1:18 PM                                haloperidol 2 MG/ML (HIGH CONC) solution   Commonly known as:  HALDOL   Take 0.25-0.5 mLs (0.5-1 mg) by mouth every 6 hours as needed for agitation or other (nausea)                                LORazepam 0.5 MG tablet   Commonly known as:  ATIVAN   Place 1-2 tablets (0.5-1 mg) under the tongue every 3 hours as needed                                morphine sulfate HIGH CONCENTRATE 100 MG/5ML (HIGH CONC) solution   Commonly known as:  ROXANOL *CONCENTRATED*   Place 0.25-0.5 mLs (5-10 mg) under the tongue every 2 hours as needed for moderate to severe pain or other (or dyspnea)                                nicotine 21 MG/24HR 24 hr patch   Commonly known as:  NICODERM CQ   Place 1 patch onto the skin daily   Last time this was given:  1 patch on 6/3/2017  9:42 AM                                nystatin [id number] UNIT/ML suspension   Commonly known as:  MYCOSTATIN   Take 5 mLs (500,000 Units) by mouth 4 times daily   Last time this was given:  500,000 Units on 6/3/2017  1:37 PM                                senna-docusate 8.6-50 MG per tablet   Commonly known as:  SENOKOT-S;PERICOLACE   Take 1 tablet by mouth 2 times daily as needed for constipation

## 2017-05-26 NOTE — IP AVS SNAPSHOT
` ` Patient Information     Patient Name Sex     Jair Fernández ([id number]) Male 1950       Room Bed    704      Patient Demographics     Address Phone    [address]  [facility] [address] [phone] (Home)  [phone] (Work)      Patient Ethnicity & Race     Ethnic Group Patient Race    American White      Emergency Contact(s)     Name Relation Home Work Mobile    Nikkie  Sister [phone]      Reid Fernández Brother   [phone]      Documents on File        Status Date Received Description       Documents for the Patient    Affiliate Privacy placeholder   phase3    Consent for EHR Access Received 17     Insurance Card       External Medication Information Consent       Patient ID Received 17     [facility] Specified Other       Consent for Services/Privacy Notice - Hospital/Clinic Received 17     Privacy Notice - [place] Received 17     Consent for Services/Privacy Notice - Hospital/Clinic       Advance Directives and Living Will Not Received  Validation of AD  2017    Advance Directives and Living Will Received 17 Health Care Directive  2017       Documents for the Encounter    CMS IM for Patient Signature Received 17 1MM    EMS/Ambulance Record  [facility]    CE Auth Form (Scanned)  17 [facility] EVERYWHERE[facility] IM for Patient Signature Received 17 2MM    CE Point of Care Auth Received        Admission Information     Attending Provider Admitting Provider Admission Type Admission Date/Time    Lance Brandt MD Zubkov, Alexander Y, MD Emergency 17  1118    Discharge Date Hospital Service Auth/Cert Status Service Area     ICU Incomplete [facility]    Unit Room/Bed Admission Status       [facility]  Admission (Confirmed)       Admission     Complaint    Stroke (H)      Hospital Account     Name Acct ID Class Status Primary Coverage     Jair Fernández [id number] Inpatient Open MEDICARE - MEDICARE            Guarantor Account (for Hospital Account #[id number])     Name Relation to Pt Service Area Active? Acct Type    Jair Fernández  FCS Yes Personal/Family    Address Phone          [address]  [place], [place] [address] [phone](H)  [phone](O)              Coverage Information (for Hospital Account #[id number])     F/O Payor/Plan Precert #    MEDICARE/MEDICARE     Subscriber Subscriber #    Jair Fernández [id number]    Address Phone    [employer] CLAIMS  [address]  [facility] [place] [address] [phone]

## 2017-05-26 NOTE — IP AVS SNAPSHOT
"` `           [facility]: [phone]                                              INTERAGENCY TRANSFER FORM - NURSING   2017                    Hospital Admission Date: 2017  TAMIKA GRIDER   : 1950  Sex: Male        Attending Provider: Lance Brandt MD     Allergies:  No Known Allergies    Infection:  None   Service:  ICU    Ht:  1.651 m (5' 5\")   Wt:  54.9 kg (121 lb 0.5 oz)   Admission Wt:  54.2 kg (119 lb 6.4 oz)    BMI:  20.14 kg/m 2   BSA:  1.59 m 2            Patient PCP Information     Provider PCP Type    None Doctor, MD General      Current Code Status     Date Active Code Status Order ID Comments User Context       6/3/2017 12:07 PM DNR/DNI [id number]  Foster Rosa MD Inpatient       Code Status History     Date Active Date Inactive Code Status Order ID Comments User Context    6/3/2017 11:50 AM 6/3/2017 12:07 PM DNR/DNI [id number]  Foster Rosa MD Outpatient    2017  9:54 AM 6/3/2017 11:50 AM DNR/DNI [id number] Code status discussion is appropriately documented in the chart. Lance Brandt MD Inpatient    2017 12:24 PM 2017  9:53 AM Full Code [id number]  Lance Brandt MD Inpatient    2017  4:19 PM 2017 12:24 PM DNR [id number]  Claudia Ashby APRN CNP Inpatient    2017  1:44 PM 2017  4:19 PM Full Code [id number]  Claudia Ashby APRN CNP Inpatient      Advance Directives        Does patient have a scanned Advance Directive/ACP document in EPIC?           No        Hospital Problems as of 6/3/2017              Priority Class Noted POA    Altered mental status, unspecified altered mental status type Medium  2017 Yes    Cerebrovascular accident (H) Medium  2017 Yes      Non-Hospital Problems as of 6/3/2017              Priority Class Noted    ACP (advance care planning) Medium  2017      Immunizations     None         END      ASSESSMENT     Discharge Profile Flowsheet     EXPECTED " "DISCHARGE     PAS Number  [id number] 06/03/17 1146    Expected Discharge Date  06/02/17 (hospice when bed and financing available) 06/01/17 1501   Senior Linkage Line Referral Placed  06/03/17 06/03/17 1146    DISCHARGE NEEDS ASSESSMENT     Referrals Placed  Emergent Admission to SNF/TCU 06/03/17 1146    Equipment Currently Used at Home  none 06/01/17 0859   SKIN      Transportation Available  car;family or friend will provide (Pt no longer drives. Family/Friends provide as needed) 06/01/17 0859   Inspection  Full 06/01/17 1740    GASTROINTESTINAL (ADULT,PEDIATRIC,OB)     Skin WDL  ex;color 06/01/17 1740    GI WDL  ex 06/02/17 2052   Skin Integrity  scab(s);bruise(s);skin tear(s) 06/01/17 1740    Last Bowel Movement  06/01/17 06/01/17 0048   Skin Color/Characteristics  redness blanchable (Coccyx) 06/01/17 1740    GI Signs/Symptoms  fecal incontinence 06/02/17 2052   Skin Moisture  flaky;dry 06/01/17 1740    COMMUNICATION ASSESSMENT     SAFETY      Patient's communication style  spoken language (English or Bilingual) 06/01/17 1403   Safety WDL  WDL 06/03/17 1355    FINAL RESOURCES     Safety Equipment  suction equipment 06/03/17 0309                 Assessment WDL (Within Defined Limits) Definitions           Safety WDL     Effective: 09/28/15    Row Information: <b>WDL Definition:</b> Bed in low position, wheels locked; call light in reach; upper side rails up x 2; ID band on<br> <font color=\"gray\"><i>Item=AS safety wdl>>List=AS safety wdl>>Version=F14</i></font>      Skin WDL     Effective: 09/28/15    Row Information: <b>WDL Definition:</b> Warm; dry; intact; elastic; without discoloration; pressure points without redness<br> <font color=\"gray\"><i>Item=AS skin wdl>>List=AS skin wdl>>Version=F14</i></font>      Vitals     Vital Signs Flowsheet     COMMENTS     Side Effects Monitoring: Respiratory Quality  R 06/03/17 1348    Comments  MRI 05/28/17 1434   Side Effects Monitoring: Respiratory Depth  N 06/03/17 1348    " "VITAL SIGNS     Side Effects Monitoring: Sedation Level  1 06/03/17 1348    Temp  98.3  F (36.8  C) 06/03/17 1348   HEIGHT AND WEIGHT      Temp src  Oral 06/03/17 1348   Height  1.651 m (5' 5\") 05/26/17 1130    Resp  16 06/03/17 1348   Height Method  Estimated 05/26/17 1130    Pulse  (!)  0 05/27/17 2048   Weight  54.9 kg (121 lb 0.5 oz) 06/01/17 0447    Heart Rate  101 06/03/17 1348   BSA (Calculated - sq m)  1.58 05/26/17 1130    Pulse/Heart Rate Source  Monitor 06/03/17 1348   BMI (Calculated)  19.91 05/26/17 1130    BP  130/89 06/03/17 1348   RESPIRATORY MONITORING      BP Location  Left arm 06/03/17 0829   Respiratory Monitoring (EtCO2)  0 mmHg 05/26/17 1137    OXYGEN THERAPY     JACINTO COMA SCALE      SpO2  92 % 06/03/17 1348   Best Eye Response  4-->(E4) spontaneous 06/02/17 1603    O2 Device  None (Room air) 06/03/17 1348   Best Motor Response  6-->(M6) obeys commands 06/02/17 1603    FiO2 (%)  35 % 06/01/17 0807   Best Verbal Response  5-->(V5) oriented 06/02/17 1603    Oxygen Delivery  10 LPM 06/01/17 0807   Jacinto Coma Scale Score  15 06/02/17 1603    PAIN/COMFORT     EKG MONITORING      Patient Currently in Pain  no 06/03/17 1348   Cardiac Regularity  Regular 05/26/17 1221    Preferred Pain Scale  number (Numeric Rating Pain Scale) 06/02/17 1603   POSITIONING      0-10 Pain Scale  5 05/30/17 1705   Body Position  supine 06/03/17 1348    Pain Location  Generalized 06/01/17 2239   Head of Bed (HOB)  HOB at 30-45 degrees 06/03/17 1348    Pain Descriptors  Discomfort 06/01/17 2239   Positioning/Transfer Devices  pillows 06/03/17 1348    Pain Intervention(s)  Medication (See eMAR);Repositioned 06/01/17 2239   DAILY CARE      Response to Interventions  Absence of nonverbal indicators of pain 06/01/17 0624   Activity Type  bedrest 06/03/17 1348    CRITICAL-CARE PAIN OBSERVATION TOOL (CPOT)     Activity Level of Assistance  assistance, 2 people 06/03/17 1348    Facial Expression  0 06/01/17 0624   ECG      " Body Movements  0 06/01/17 0624   ECG Rhythm  Sinus tachycardia 05/31/17 1835    Compliance w/ventilator (intubated patients)  Extubated 06/01/17 0624   Ectopy  None 05/30/17 1902    Vocalization (extubated patients)  0 06/01/17 0624   POINT OF CARE TESTING      Muscle Tension  0 06/01/17 0624   Puncture Site  fingertip 06/01/17 0447    Total  0 06/01/17 0624   Bedside Glucose (mg/dl )   98 mg/dl 06/01/17 0447    ANALGESIA SIDE EFFECTS MONITORING                   Patient Lines/Drains/Airways Status    Active LINES/DRAINS/AIRWAYS     Name: Placement date: Placement time: Site: Days: Last dressing change:    Urethral Catheter Coude 16 fr 05/26/17   1330   Coude   8     Wound 06/01/17 Left Arm Skin tear Small skin tears x2  06/01/17   0800   Arm   2             Patient Lines/Drains/Airways Status    Active PICC/CVC     None            Intake/Output Detail Report     Date Intake           Output   Net    Shift P.O. I.V. NG/GT IV Piggyback Colloid Enteral Total Urine Emesis/NG output Total       Noc 06/01/17 2300 - 06/02/17 0659 -- -- -- -- -- -- -- -- -- -- 0    Day 06/02/17 0700 - 06/02/17 1459 -- -- -- -- -- -- -- 1050 -- 1050 -1050    Coreen 06/02/17 1500 - 06/02/17 2259 -- -- -- -- -- -- -- 250 -- 250 -250    Noc 06/02/17 2300 - 06/03/17 0659 -- -- -- -- -- -- -- 225 -- 225 -225    Day 06/03/17 0700 - 06/03/17 1459 -- -- -- -- -- -- -- 100 -- 100 -100      Last Void/BM       Most Recent Value    Urine Occurrence     Stool Occurrence 1 at 06/03/2017 0945      Case Management/Discharge Planning     Case Management/Discharge Planning Flowsheet     REFERRAL INFORMATION     Expected Discharge Date  06/02/17 (hospice when bed and financing available) 06/01/17 1501    Admission Type  inpatient 05/26/17 1608   DISCHARGE PLANNING      Arrived From  other (see comments) (found down) 05/26/17 0292   Transportation Available  car;family or friend will provide (Pt no longer drives. Family/Friends provide as needed) 06/01/17  0859    Referral Source  physician 05/26/17 1608   FINAL RESOURCES      New Steerage to  Clinics?  No 05/26/17 1608   Equipment Currently Used at Home  none 06/01/17 0859    Reason For Consult  discharge planning 06/03/17 1146   PAS Number  [id number] 06/03/17 1146    Record Reviewed  medical record 06/03/17 1146   Senior Linkage Line Referral Placed  06/03/17 06/03/17 1146     Assigned to Case  Nhung Lockhart 06/03/17 1146   Referrals Placed  Emergent Admission to SNF/TCU 06/03/17 1146    Primary Care Clinic Name  [facility] 05/26/17 1608   ABUSE RISK SCREEN      LIVING ENVIRONMENT     QUESTION TO PATIENT:  Has a member of your family or a partner(now or in the past) intimidated, hurt, manipulated, or controlled you in any way?  no 06/01/17 1403    Lives With  other relative(s) (specify) (nephew) 06/01/17 0859   QUESTION TO PATIENT: Do you feel safe going back to the place where you are living?  yes 06/01/17 1403    Living Arrangements  [place] 06/01/17 0859   OBSERVATION: Is there reason to believe there has been maltreatment of a vulnerable adult (ie. Physical/Sexual/Emotional abuse, self neglect, lack of adequate food, shelter, medical care, or financial exploitation)?  no 06/01/17 1403    COPING/STRESS     (R) MENTAL HEALTH SUICIDE RISK      Major Change/Loss/Stressor  none 06/01/17 1406   Are you depressed or being treated for depression?  No 06/01/17 1403    EXPECTED DISCHARGE

## 2017-05-26 NOTE — CONSULTS
"CLINICAL NUTRITION SERVICES  -  ASSESSMENT NOTE    Recommendations Ordered by Registered Dietitian (RD):   Recommend TF: Peptamen Intense VHP @ 40 mL/hr x 24 hours (960 mL) to provide 960 kcal, 89 g pro (1.6 g/kg), 75 g CHO, 3.8 g Fiber, 806 mL free H2O.   - 60 mL fluid flush q 4 hrs for tube patency     Total (TF + D5 + Prop) = 1534 kcal (28 kcal/kg), 89 g pro (1.6 g/kg)    Begin TF @ 15 ml/hr. After 12 hours with good tolerance and Phos >1.9, begin advancement 15 ml q 12 hours until goal is met.    Malnutrition: Based on physical assessment pt likely with severe malnutrition  In Context of:  Acute illness or injury  Chronic illness or disease     REASON FOR ASSESSMENT  Jair Fernández is a 66 year old male seen by Registered Dietitian for Provider Order - Registered Dietitian to Assess and Recommend TF.  Provider responsible for entering TF orders.   Verbal order received for RD to enter TF orders, tube not yet placed, likely gastric.     NUTRITION HISTORY  - No medical history available. Pt intubated, sedated unable to obtain nutrition history.     CURRENT NUTRITION ORDERS  Diet Order:     NPO     Current Intake/Tolerance:  NA    PHYSICAL FINDINGS  Observed  Pt covered up to shoulders with blanket - observed face, shoulders, chest areas. Bones pronounced in shoulder/chest  Muscle Wasting - facial, shoulders, clavicle, severe wasting  Subcutaneous fat loss - facial, chest area, severe wasting  Obtained from Chart/Interdisciplinary Team  Appears cachectic   FT to be placed - likely gastric    ANTHROPOMETRICS  Height: 5' 5\"  Weight: 119 lbs 6.4 oz (54.2 kg)  Body mass index is 19.87 kg/(m^2).  Weight Status:  Normal BMI  IBW: 56.8 kg  % IBW: 95%  Weight History:   Wt Readings from Last 10 Encounters:   05/26/17 54.2 kg (119 lb 6.4 oz)       LABS  Labs reviewed  K 4.2 (NL)  BGs 156, 196 - trending high    MEDICATIONS  Medications reviewed  D5 @ 100 ml/hr -> 120 g CHO, 408 kcal daily from dextrose   Propofol @ 3.3 " ml/hr --> 166 kcal daily from lipid  Electrolyte replacement protocols ordered    Dosing Weight 54.2 kg - admit    ASSESSED NUTRITION NEEDS PER APPROVED PRACTICE GUIDELINES:  Estimated Energy Needs: 9939-2362 kcals (25-30 Kcal/Kg)  Justification: repletion  Estimated Protein Needs:  grams protein (1.5-2 g pro/Kg)  Justification: Repletion  Estimated Fluid Needs: 7070-7846 mL (1 mL/Kcal)  Justification: maintenance    MALNUTRITION:  % Weight Loss:  No weight history on file  % Intake:  Unable to evaluate  Subcutaneous Fat Loss:  Orbital region severe depletion and Upper arm region severe depletion  Muscle Loss:  Temporal region severe depletion, Clavicle bone region severe depletion and Acromion bone region severe depletion  Fluid Retention:  None noted    Malnutrition Diagnosis: Severe malnutrition  In Context of:  Acute illness or injury  Chronic illness or disease    NUTRITION DIAGNOSIS:  Inadequate protein-energy intake related to inability for PO 2/2 intubation as evidenced by plan for TF as pt currently meeting ~40% est energy needs and 0% protein needs via D5 and Propofol administration.      NUTRITION INTERVENTIONS  Recommendations / Nutrition Prescription  Recommend TF: Peptamen Intense VHP @ 40 mL/hr x 24 hours (960 mL) to provide 960 kcal, 89 g pro (1.6 g/kg), 75 g CHO, 3.8 g Fiber, 806 mL free H2O.   - 60 mL fluid flush q 4 hrs for tube patency     Total (TF + D5 + Prop) = 1534 kcal (28 kcal/kg), 89 g pro (1.6 g/kg)    Begin TF @ 15 ml/hr. After 12 hours with good tolerance and Phos >1.9, begin advancement 15 ml q 12 hours until goal is met.     Implementation  Nutrition education: Not appropriate at this time due to patient condition  EN Composition, EN Schedule and Feeding Tube Flush: ordered in EPIC per Verbal order as above.     Nutrition Goals  Peptamen Intense VHP + Prop + D5 to meet % estimated needs in the next 48-60 hours.      MONITORING AND EVALUATION:  Progress towards goals will  be monitored and evaluated per protocol and Practice Guidelines    Xiao Fermin RD, LD

## 2017-05-26 NOTE — PROGRESS NOTES
"Care Coordination:    Care Coordination requested to assist in potentially finding a contact for patient.  The phone numbers in [facility] and also in [facility] via Care Everywhere did not result in an answered call. The  listed in pt's wallet with last name Pradeep denies having a relative by the name of patient.  Located pt's cell phone.  Last person called was \"Kvein.\"  Called Kevin ([phone]), who identifies himself as a friend.  He states pt has a sister, but doesn't know her name.  He says pt's father's name is also Kevin, and pt has a nephew Kevin who lives on [address] [place] [address].      Called contact listed as \"dad\" [phone], which went to voicemail.      Called second-most-recently-called contact Nikolai ([phone]), who stated pt is his uncle, and lives with him.  I explained we were looking for a family contact who we could update, and who could help guide care.  Nikolai states his mother, pt's sisterNikkie would be best contact: [phone].        Belle Tellez RN, BSN  [facility] Care Coordinator   Mobile Phone: [phone]    "

## 2017-05-26 NOTE — IP AVS SNAPSHOT
` `     [place] [address] [facility]: [phone]            Medication Administration Report for Jair Fernández as of 06/03/17 1888   Legend:    Given Hold Not Given Due Canceled Entry Other Actions    Time Time (Time) Time  Time-Action       Inactive    Active    Linked        Medications 05/28/17 05/29/17 05/30/17 05/31/17 06/01/17 06/02/17 06/03/17    acetaminophen (TYLENOL) tablet 650 mg  Dose: 650 mg Freq: EVERY 4 HOURS PRN Route: PO  PRN Reason: mild pain  Start: 06/01/17 1004   Admin Instructions: Maximum acetaminophen dose from all sources = 75 mg/kg/day not to exceed 4 grams/day.         2222 (650 mg)-Given            Or  acetaminophen (TYLENOL) Suppository 650 mg  Dose: 650 mg Freq: EVERY 4 HOURS PRN Route: RE  PRN Reason: mild pain  Start: 06/01/17 1004   Admin Instructions: Maximum acetaminophen dose from all sources = 75 mg/kg/day not to exceed 4 grams/day.                      atropine 1 % ophthalmic solution 1-2 drop  Dose: 1-2 drop Freq: EVERY 1 HOUR PRN Route: SL  PRN Reason: other  PRN Comment: secretions  Start: 06/01/17 1003              glycopyrrolate (ROBINUL) injection 0.2-0.4 mg  Dose: 0.2-0.4 mg Freq: EVERY 4 HOURS PRN Route: IV  PRN Reason: other  PRN Comment: secretions  Start: 06/01/17 1003              haloperidol (HALDOL) 2 MG/ML (HIGH CONC) solution 0.5-1 mg  Dose: 0.5-1 mg Freq: EVERY 6 HOURS PRN Route: PO  PRN Reasons: agitation,other  PRN Comment: nausea  Start: 06/01/17 1003              LORazepam (ATIVAN) injection 0.5-1 mg  Dose: 0.5-1 mg Freq: EVERY 3 HOURS PRN Route: IV  PRN Reasons: anxiety,seizures,nausea  PRN Comment: dyspnea  Start: 06/01/17 1002   Admin Instructions: Avoid if delirium present.  Use fourth line for nausea.  For IV PUSH: Dilute with equal volume of NS.              Or  LORazepam (ATIVAN) tablet 0.5-1 mg  Dose: 0.5-1 mg Freq: EVERY 3 HOURS PRN Route: PO  PRN Reasons: anxiety,seizures,nausea  PRN Comment: dyspnea  Start: 06/01/17 1002   Admin  Instructions: Avoid if delirium present.  Use fourth line for nausea.              Or  LORazepam (ATIVAN) tablet 0.5-1 mg  Dose: 0.5-1 mg Freq: EVERY 3 HOURS PRN Route: SL  PRN Reasons: anxiety,seizures,nausea  PRN Comment: dyspnea  Start: 06/01/17 1002   Admin Instructions: Avoid if delirium present.  Use fourth line for nausea.               morphine solution 5-10 mg  Dose: 5-10 mg Freq: EVERY 2 HOURS PRN Route: PO  PRN Reason: moderate to severe pain  PRN Comment: or dyspnea  Start: 06/01/17 1001             Or  morphine sulfate HIGH CONCENTRATE (ROXANOL *CONCENTRATED*) 100 MG/5ML (HIGH CONC) solution 5-10 mg  Dose: 5-10 mg Freq: EVERY 2 HOURS PRN Route: SL  PRN Reasons: moderate to severe pain,other  PRN Comment: or dyspnea  Start: 06/01/17 1001   Admin Instructions: Caution: Solution is 10 times more concentrated than standard solution. Verify dose volume.               nicotine (NICODERM CQ) 21 MG/24HR 24 hr patch 1 patch  Dose: 1 patch Freq: DAILY Route: TD  Start: 06/01/17 1115        1124 (1 patch)-Given        0904 (1 patch)-Given        0942 (1 patch)-Given           nicotine Patch in Place  Freq: EVERY 8 HOURS Route: TD  Start: 06/01/17 1115   Admin Instructions: Chart every shift, confirming that patch is still in place on patient (no barcode scan needed). See patch order for dose information.         1126 ( )-Given       2039 ( )-Patch in Place        0449 ( )-Patch in Place       1200 ( )-Given       1900 ( )-Patch in Place        0505 ( )-Patch in Place       1141 ( )-Patch in Place       [ ] 1915           nicotine patch REMOVAL  Freq: DAILY Route: TD  Start: 06/02/17 0900   Admin Instructions: Remove patch when new patch is applied or patch is discontinued.          0910 ( )-Patch Removed        0930 ( )-Patch Removed           nystatin (MYCOSTATIN) suspension 500,000 Units  Dose: 500,000 Units Freq: 4 TIMES DAILY Route: PO  Start: 06/01/17 1300   Admin Instructions: Shake Well.         1315  (500,000 Units)-Given       1752 (500,000 Units)-Given       2222 (500,000 Units)-Given        0904 (500,000 Units)-Given       1310 (500,000 Units)-Given       1903 (500,000 Units)-Given       (2234)-Not Given        (0944)-Not Given [C]       0958 (500,000 Units)-Given       1337 (500,000 Units)-Given       [ ] 1800       [ ] 2200           senna-docusate (SENOKOT-S;PERICOLACE) 8.6-50 MG per tablet 1 tablet  Dose: 1 tablet Freq: 2 TIMES DAILY PRN Route: PO  PRN Reason: constipation  Start: 06/01/17 1003             Future Medications  Medications 05/28/17 05/29/17 05/30/17 05/31/17 06/01/17 06/02/17 06/03/17       bisacodyl (DULCOLAX) Suppository 10 mg  Dose: 10 mg Freq: ONCE PRN Route: RE  PRN Reason: other  PRN Comment: for no bowel movement for 72 hours  Start: 06/04/17 0000   Admin Instructions: Give only after rectal check for impacted stool.              Discontinued Medications  Medications 05/28/17 05/29/17 05/30/17 05/31/17 06/01/17 06/02/17 06/03/17         Rate: 100 mL/hr Freq: CONTINUOUS Route: IV  Start: 05/30/17 0815   End: 06/01/17 0953      1004 ( )-New Bag       2050 ( )-New Bag        0909 ( )-New Bag       1815 ( )-New Bag        0427 ( )-New Bag       0801 ( )-Rate/Dose Verify       0953-Med Discontinued           Dose: 2.5 mg Freq: EVERY 2 HOURS PRN Route: NEBULIZATION  PRN Reason: other  PRN Comment: dyspnea  Start: 05/31/17 1105   End: 06/01/17 0953        0953-Med Discontinued           Dose: 2.5 mg Freq: EVERY 6 HOURS Route: NEBULIZATION  Start: 05/26/17 1900   End: 06/01/17 0953    0025 (2.5 mg)-Given       0807 (2.5 mg)-Given       1207 (2.5 mg)-Given       1922 (2.5 mg)-Given        0047 (2.5 mg)-Given       0721 (2.5 mg)-Given       1518 (2.5 mg)-Given [C]       2029 (2.5 mg)-Given        0213 (2.5 mg)-Given       0737 (2.5 mg)-Given       1402 (2.5 mg)-Given       2013 (2.5 mg)-Given        0031 (2.5 mg)-Given       0721 (2.5 mg)-Given       1304 (2.5 mg)-Given       1932 (2.5  mg)-Given        0054 (2.5 mg)-Given       0800 (2.5 mg)-Given       0953-Med Discontinued           Dose: 10 mg Freq: DAILY PRN Route: RE  PRN Reason: constipation  Start: 05/30/17 1044   End: 06/01/17 0953      1318 (10 mg)-Given         0953-Med Discontinued           Freq: CONTINUOUS PRN Route: IV  PRN Comment: Hypoglycemia prevention  Start: 05/26/17 1447   End: 06/01/17 0953   Admin Instructions: For Hypoglycemia Prevention for patients on long-acting subcutaneous basal insulin (Glargine, Detemir, NPH) or continuous insulin infusion. Whenever nutrition support is held or interrupted:   1) Infuse IV D10W at nutrition support rate  2) Notify provider for further instructions         0953-Med Discontinued           Dose:  mcg Freq: EVERY 1 HOUR PRN Route: IV  PRN Reason: moderate to severe pain  Start: 05/30/17 0127   End: 06/01/17 0953      0135 (100 mcg)-Given       0442 (100 mcg)-Given       1322 (100 mcg)-Given       1703 (100 mcg)-Given         0038 (100 mcg)-Given       0253 (100 mcg)-Given       0507 (100 mcg)-Given       0953-Med Discontinued           Dose: 100 mg PE Freq: EVERY 12 HOURS Route: IV  Start: 05/27/17 1015   End: 06/01/17 0953   Admin Instructions: Maintenance dose is to be given over 20-30 minutes. *For Adults: Max rate is 150 mg PE/min. * For Pediatrics: Max loading dose rate 1 to 3 mg PE/kg/minute. Max fosphenytoin concentration of 25 mg PE/mL.     1014 (100 mg PE)-New Bag       2148 (100 mg PE)-New Bag        0953 (100 mg PE)-New Bag       2156 (100 mg PE)-New Bag        1004 (100 mg PE)-New Bag       2227 (100 mg PE)-New Bag        0924 (100 mg PE)-New Bag       2244 (100 mg PE)-New Bag        0953-Med Discontinued           Dose: 2-5 mL Freq: ONCE PRN Route: IK  PRN Reason: line flush  PRN Comment: for locking each dormant lumen with line placement  Start: 05/26/17 1400   End: 06/01/17 0953   Admin Instructions: May repeat x 1         0953-Med Discontinued           Dose: 5,000  Units Freq: EVERY 8 HOURS Route: SC  Start: 05/26/17 1430   End: 06/01/17 0953    0526 (5,000 Units)-Given       1508 (5,000 Units)-Given       2147 (5,000 Units)-Given        0536 (5,000 Units)-Given       1351 (5,000 Units)-Given       2156 (5,000 Units)-Given        0530 (5,000 Units)-Given       1705 (5,000 Units)-Given       2231 (5,000 Units)-Given        0642 (5,000 Units)-Given       1337 (5,000 Units)-Given       2244 (5,000 Units)-Given        0644 (5,000 Units)-Given       0953-Med Discontinued           Dose: 20 mg Freq: EVERY 6 HOURS PRN Route: IV  PRN Reason: high blood pressure  Start: 06/01/17 0751   End: 06/01/17 0953   Admin Instructions: For BP<180 mm Hg         0953-Med Discontinued           Freq: ONCE PRN Route: Top  PRN Reason: moderate pain  PRN Comment: for local anesthetic during PICC insertion  Start: 05/26/17 1400   End: 06/01/17 0953   Admin Instructions: Apply to PICC Insertion Site. Apply 30 minutes prior to procedure. MAX Dose: 2.5 gm  (1/2 of 5 gm tube)           0953-Med Discontinued           Start: 05/30/17 2044   End: 06/01/17 1309   Admin Instructions: RADU Lindquist, Fanny: cabinet override       (7984)-Not Given [C]         1309-Med Discontinued           Dose: 1-4 mg Freq: EVERY 15 MIN PRN Route: IV  PRN Reason: other  PRN Comment: Alcohol withdrawal symptoms. Frequency and dose vary according to CIWA-Ar score.  Start: 05/30/17 1852   End: 06/01/17 0953   Admin Instructions: For Score less than 4 give NO lorazepam and repeat scale in 2 hours and prn  For Score 4-6 give lorazepam 1 mg IV and repeat scale in 1 hour.  For Score 7-9 give lorazepam 2 mg IV and repeat scale in 1 hour.  For Score 10 - 14 give lorazepam 2 mg IV and repeat scale in 30 minutes.  For Score greater than 14 give lorazepam 4 mg IV and repeat scale in 15 minutes.  For IV PUSH: Dilute with equal volume of NS.       2045 (4 mg)-Given       2230 (2 mg)-Given        0122 (1 mg)-Given       0258 (1 mg)-Given        0532 (1 mg)-Given       0654 (2 mg)-Given       1521 (2 mg)-Given       1712 (4 mg)-Given       2335 (4 mg)-Given        0953-Med Discontinued           Dose: 2 g Freq: DAILY PRN Route: IV  PRN Reason: magnesium supplementation  Start: 05/28/17 1609   End: 06/01/17 0953   Admin Instructions: For Serum Mg++ 1.6 - 2 mg/dL  Give 2 g and recheck magnesium level next AM.        0528 (2 g)-New Bag        0953-Med Discontinued           Dose: 4 g Freq: EVERY 4 HOURS PRN Route: IV  PRN Reason: magnesium supplementation  Start: 05/28/17 1609   End: 06/01/17 0953   Admin Instructions: For serum Mg++ less than 1.6 mg/dL  Give 4 g and recheck magnesium level 2 hours after dose, and next AM.         0953-Med Discontinued           Freq: CONTINUOUS PRN Route: XX  Start: 05/26/17 1339   End: 06/01/17 0953   Admin Instructions: No D5W IV solutions unless patient hypoglycemic.  Pharmacy to remove all dextrose from iv fluid orders as able.         0953-Med Discontinued           Dose: 62.5 mg Freq: EVERY 6 HOURS Route: IV  Start: 05/31/17 1230   End: 06/01/17 0953   Admin Instructions: Doses greater than 125 mg need to be in at least 50 mL IVPB        1336 (62.5 mg)-Given       1819 (62.5 mg)-Given        0032 (62.5 mg)-Given       0645 (62.5 mg)-Given       0953-Med Discontinued           Dose: 0.1-0.4 mg Freq: EVERY 2 MIN PRN Route: IV  PRN Reason: opioid reversal  Start: 05/26/17 1335   End: 06/01/17 0953   Admin Instructions: For respiratory rate LESS than or EQUAL to 8.  Partial reversal dose:  0.1 mg titrated q 2 minutes for Analgesia Side Effects Monitoring Sedation Level of 3 (frequently drowsy, arousable, drifts to sleep during conversation).Full reversal dose:  0.4 mg bolus for Analgesia Side Effects Monitoring Sedation Level of 4 (somnolent, minimal or no response to stimulation).         0953-Med Discontinued           Dose: 4 mg Freq: EVERY 6 HOURS PRN Route: PO  PRN Reason: nausea  Start: 05/26/17 1334   End: 06/01/17  0953   Admin Instructions: This is Step 1 of nausea and vomiting management.  If nausea not resolved in 15 minutes, go to Step 2 prochlorperazine (COMPAZINE). Do not push through foil backing. Peel back foil and gently remove. Place on tongue immediately. Administration with liquid unnecessary         0953-Med Discontinued        Or    Dose: 4 mg Freq: EVERY 6 HOURS PRN Route: IV  PRN Reasons: nausea,vomiting  Start: 05/26/17 1334   End: 06/01/17 0953   Admin Instructions: This is Step 1 of nausea and vomiting management.  If nausea not resolved in 15 minutes, go to Step 2 prochlorperazine (COMPAZINE).  Irritant.         0953-Med Discontinued           Dose: 17 g Freq: 2 TIMES DAILY PRN Route: PO  PRN Reason: constipation  Start: 05/30/17 1043   End: 06/01/17 0953   Admin Instructions: 1 Packet = 17 grams. Mixed prescribed dose in 8 ounces of water. Follow with 8 oz. of water.         0953-Med Discontinued           Dose: 20-40 mEq Freq: EVERY 2 HOURS PRN Route: ORAL OR FEED  PRN Reason: potassium supplementation  Start: 05/28/17 1609   End: 06/01/17 0953   Admin Instructions: Use if unable to tolerate tablets.    If Serum K+ 3.4-4.0, dose = 20 mEq x1. Recheck K+ level the next AM.  If Serum K+ 3.0-3.3, dose = 60 mEq po total dose (40 mEq x 1 followed in 2 hours by 20 mEq X1). Recheck K+ level 4 hours after dose and the next AM.  If Serum K+ 2.5-2.9, dose = 80 mEq po total dose (40 mEq Q2H x2). Recheck K+ level 4 hours after dose and the next AM.  If Serum K+ less than 2.5, See IV order.  Dissolve packet contents in 4-8 ounces of cold water or juice.      0535 (40 mEq)-Given       0751 (20 mEq)-Given        0659 (20 mEq)-Given         0953-Med Discontinued           Dose: 10 mEq Freq: EVERY 1 HOUR PRN Route: IV  PRN Reason: potassium supplementation  Start: 05/28/17 1609   End: 06/01/17 0953   Admin Instructions: Infuse via PERIPHERAL LINE or CENTRAL LINE. Use for central line replacement if patient weight less than  65 kg, if patient is on TPN with high potassium content or if unit does not stock 20 mEq bags.  If Serum K+ 3.4-4.0, dose = 10 mEq/hr x2 doses. Recheck K+ level the next AM.  If Serum K+ 3.0-3.3, dose = 10 mEq/hr x4 doses (40 mEq IV total dose). Recheck K+ level 2 hours after dose and the next AM.  If Serum K+ less than 3.0, dose = 10 mEq/hr x6 doses (60 mEq IV total dose). Recheck K+ level 2 hours after dose and the next AM.         0953-Med Discontinued           Dose: 10 mEq Freq: EVERY 1 HOUR PRN Route: IV  PRN Reason: potassium supplementation  Start: 05/28/17 1609   End: 06/01/17 0953   Admin Instructions: Infuse via PERIPHERAL LINE. Use potassium with lidocaine for pain with peripheral administration.  If Serum K+ 3.4-4.0, dose = 10 mEq/hr x2 doses. Recheck K+ level the next AM.  If Serum K+ 3.0-3.3, dose = 10 mEq/hr x4 doses (40 mEq IV total dose). Recheck K+ level 2 hours after dose and the next AM.  If Serum K+ less than 3.0, dose = 10 mEq/hr x6 doses (60 mEq IV total dose). Recheck K+ level 2 hours after dose and the next AM.         0953-Med Discontinued           Dose: 20 mEq Freq: EVERY 1 HOUR PRN Route: IV  PRN Reason: potassium supplementation  Last Dose: 20 mEq (05/31/17 0731)  Start: 05/28/17 1609   End: 06/01/17 0953   Admin Instructions: Infuse via CENTRAL LINE Only.  May need EKG if less than 65 kg or on TPN - Max rate is 0.3 mEq/kg/hr for patients not on EKG monitoring.    If Serum K+ 3.4-4.0, dose = 20 mEq/hr x1 doses. Recheck K+ level the next AM.  If Serum K+ 3.0-3.3, dose = 20 mEq/hr x2 doses (40 mEq IV total dose).  Recheck K+ level 2 hours after dose and the next AM.  If Serum K+ less than 3.0, dose = 20 mEq/hr x3 doses (60 mEq IV total dose). Recheck K+ level 2 hours after dose and the next AM.       1012 (20 mEq)-New Bag        0731 (20 mEq)-New Bag        0953-Med Discontinued           Dose: 20-40 mEq Freq: EVERY 2 HOURS PRN Route: PO  PRN Reason: potassium supplementation  Start:  05/28/17 1609   End: 06/01/17 0953   Admin Instructions: Use if able to take PO.   If Serum K+ 3.4-4.0, dose = 20 mEq x1. Recheck K+ level the next AM.  If Serum K+ 3.0-3.3, dose = 60 mEq po total dose (40 mEq x1 followed in 2 hours by 20 mEq x1). Recheck K+ level 4 hours after dose and the next AM.  If Serum K+ 2.5-2.9, dose = 80 mEq po total dose (40 mEq Q2H x2). Recheck K+ level 4 hours after dose and the next AM.  If Serum K+ less than 2.5, See IV order.  DO NOT CRUSH         0953-Med Discontinued           Dose: 10 mmol Freq: DAILY PRN Route: IV  PRN Reason: phosphorous supplementation  Start: 05/28/17 1609   End: 06/01/17 0953   Admin Instructions: For serum phosphorus level 2.5-2.7  Do not infuse Phosphorus in the same line as TPN.   Give 10 mmol and recheck phosphorus level the next AM.      0954 (10 mmol)-New Bag          0953-Med Discontinued           Dose: 15 mmol Freq: DAILY PRN Route: IV  PRN Reason: phosphorous supplementation  Start: 05/28/17 1609   End: 06/01/17 0953   Admin Instructions: For serum phosphorus level 2.0-2.4  Do not infuse Phosphorus in the same line as TPN.   Give 15 mmol and recheck phosphorus level next AM.         0953-Med Discontinued           Dose: 20 mmol Freq: EVERY 6 HOURS PRN Route: IV  PRN Reason: phosphorous supplementation  Start: 05/28/17 1609   End: 06/01/17 0953   Admin Instructions: For serum phosphorus level 1.1-1.9  For CENTRAL Line ONLY  Do not infuse Phosphorus in the same line as TPN.   Give 20 mmol and recheck phosphorus level 2 hours after dose and next AM.         0953-Med Discontinued           Dose: 20 mmol Freq: EVERY 6 HOURS PRN Route: IV  PRN Reason: phosphorous supplementation  Start: 05/28/17 1609   End: 06/01/17 0953   Admin Instructions: For serum phosphorus level 1.1-1.9  For peripheral line  Do not infuse Phosphorus in the same line as TPN.   Give 20 mmol and recheck phosphorus level 2 hours after dose and next AM. Repeat if necessary.          0953-Med Discontinued           Dose: 25 mmol Freq: EVERY 8 HOURS PRN Route: IV  PRN Reason: phosphorous supplementation  Start: 05/28/17 1609   End: 06/01/17 0953   Admin Instructions: For serum phosphorus level less than 1.1  Do not infuse Phosphorus in the same line as TPN.   Give 25 mmol and recheck phosphorus level 2 hours after dose and next AM.         0953-Med Discontinued           Rate: 0-13 mL/hr Freq: CONTINUOUS Route: IV  Last Dose: Stopped (05/30/17 1504)  Start: 05/29/17 1015   End: 06/01/17 0953   Admin Instructions: For sedation of mechanically ventilated patients.  For range orders: start at lowest dose ordered. Titrate by 5-10 mcg/kg/min every 5 minutes to achieve the defined goal sedation score. If ordered, consider using bolus doses of propofol for acute agitation before titrating infusion.      1042 (40 mcg/kg/min)-New Bag       1100 (40 mcg/kg/min)-Rate/Dose Verify       1200 (40 mcg/kg/min)-Rate/Dose Verify       1300 (40 mcg/kg/min)-Rate/Dose Verify       1400 (40 mcg/kg/min)-Rate/Dose Verify       1500 (40 mcg/kg/min)-Rate/Dose Verify       1559 (40 mcg/kg/min)-New Bag       1600 (40 mcg/kg/min)-Rate/Dose Verify       1700 (40 mcg/kg/min)-Rate/Dose Verify       1800 (40 mcg/kg/min)-Rate/Dose Verify       1939 (40 mcg/kg/min)-Rate/Dose Verify       2004 (40 mcg/kg/min)-New Bag        0530 (40 mcg/kg/min)-New Bag       1203 (40 mcg/kg/min)-New Bag       1504-Stopped         0953-Med Discontinued           Dose: 1 tablet Freq: 2 TIMES DAILY Route: PO  Start: 06/01/17 1015   End: 06/01/17 1003        1003-Med Discontinued           Dose: 1-2 tablet Freq: 2 TIMES DAILY PRN Route: PO  PRN Comment: constipation   Start: 05/26/17 1334   End: 06/01/17 0953   Admin Instructions: If no bowel movement in 24 hours, increase to 2 tablets PO BID.  Hold for loose stools.   This is the first step of a three step constipation treatment protocol.         0953-Med Discontinued           Dose: 10 mL Freq: EVERY  8 HOURS Route: IK  Start: 05/26/17 1415   End: 06/01/17 0953   Admin Instructions: And Q1H PRN, to lock CVC - Open Ended (Tunneled and Non-Tunneled) dormant lumen.     0526 (10 mL)-Given       1508 (10 mL)-Given       2148 (10 mL)-Given        0536 (10 mL)-Given       1352 (10 mL)-Given       2157 (10 mL)-Given        0533 (10 mL)-Given       1319 (10 mL)-Given       (2322)-Not Given        0642 (10 mL)-Given       1322 (10 mL)-Given       2335 (10 mL)-Given        0645 (10 mL)-Given       0953-Med Discontinued           Dose: 10-20 mL Freq: EVERY 1 HOUR PRN Route: IK  PRN Reasons: line flush,post meds or blood draw  Start: 05/26/17 1405   End: 06/01/17 0953   Admin Instructions: to flush CVC - Open Ended (Tunneled and Non-Tunneled).  10 mL post IV meds; 20 mL post blood draw.         0953-Med Discontinued           Dose: 1 enema Freq: ONCE PRN Route: RE  PRN Reason: constipation  Start: 05/26/17 1335   End: 06/01/17 0953   Admin Instructions: Use caution in renal failure, CHF, electrolyte disturbances, or age greater than 55 due to increased risk of acute phosphate nephropathy.  Hold for loose stools.  This is the third step of a three step constipation treatment protocol.         0953-Med Discontinued           Dose: 200 mg Freq: DAILY Route: ORAL OR FEED  Start: 05/31/17 2000   End: 06/01/17 0953       (2051)-Not Given        (0953)-Not Given       0953-Med Discontinued      Medications 05/28/17 05/29/17 05/30/17 05/31/17 06/01/17 06/02/17 06/03/17

## 2017-05-26 NOTE — CONSULTS
[facility]    History and Physical/ Consult  Critical Care Service     Date of Admission:  5/26/2017  Date of Service (when I saw the patient): 05/26/17    Assessment & Plan   Jair Fernández is a 66 year old male who presents with AMS.  EMS  Found pt unresponsive outside on the ground of a local [employer] [place]..Brought to the ED  The patient was also hypotension with systolic pressures in the 70s.  Intubated there.  No medical history is available.  Pt admitted to ICU for further cares.    Neuro  1. AMS, question of etiology, eval for seizures  2. Left sided hemiparesis   3. Hx multiple strokes and chronic right carotid occlusion per MRI  Plan:  -- Neuro cares managed by Dr Brandt and neurocrit team  -- Propofol for sedation as needed until extubation  -- Reassess after intubation meds have metabolized   -- Start thiamine, given unknown hx  -- EEG in process    CV  1. Hypotension with lactic acidosis,   Plan:  -- Most likely hypovolemic.  Keep MAP > 65, fluids as needed to achieve this  -- Recheck lactate at 16:00, watch for other s/s of infection    Resp:  1. Acute respiratory failure, per report, pt intubated 2nd to emesis and AMS in ED  Plan:  -- CXR ordered to check ETT placement now  -- AC ventilation 16* 450 +5  -- PST later today, and daily     GI/Nutrition  1. Malnutrition, raul/pro deficit  Plan:  -- NPO  -- PPI  -- RD consult, request recommendations for TF given evidence of malnutrition, will place tube of some variety after pt settled and start feeds asap    Renal  No known hx.  Creatinine 1.2 on admit  Plan:  -- Monitor  -- Check UA and urine tox    Endocrine  1. Hyperglycemia  Plan:  --  Insulin gtt per protocol if indicated  --  Keep BG  <180 for optimal healing    Heme:  1. Leukocytosis  Plan:  -- Monitor for other s/s infection    General cares:  DVT Prophylaxis: Pneumatic Compression Devices, heparin sq  GI Prophylaxis: PPI  Restraints: Restraints for medical healing needed:  YES  Family update by me today: No.  SW informed of lack of family/hx, will look into it.  Current lines are required for patient management  Access:  PIV, will ask for PICC emergently as unable to get more access.      Claudia Ashby    Time Spent on this Encounter   Billing:  I spent 60 minutes bedside and on the inpatient unit today managing the critical care of Jair Fernández in relation to the issues listed in this note.    Code Status   Full Code    Primary Care Physician   None Doctor    Chief Complaint   AMS    History of Present Illness   Jair Fernández is a 66 year old male who presents with AMS.  EMS reported that the patient was found unresponsive outside on the ground of a local [employer] [place] at approximately 0945. Per EMS, the patient has left sided deficits and left sided weakness. The patient was also hypotension with systolic pressures in the 70s.  He was brought to the ED intubated for airway protection, resuscitated with 2L NS, and assessed by CT head/MRI brain.  He was then transferred to the ICU for further cares    Past Medical History  .   No past medical history on file.    Past Surgical History   No past surgical history on file.    Prior to Admission Medications   None     Allergies   No Known Allergies    Social History   None on file    Family History   No family history on file.    Review of Systems   Review of systems not obtained due to patient factors - intubation and sedation    Physical Exam   Temp: 97.1  F (36.2  C) Temp src: Temporal Temp  Min: 97.1  F (36.2  C)  Max: 97.1  F (36.2  C) BP: 126/78 Pulse: 81 Heart Rate: 81 Resp: (!) 34 SpO2: 95 % O2 Device: None (Room air)    Vital Signs with Ranges  Temp:  [97.1  F (36.2  C)] 97.1  F (36.2  C)  Pulse:  [81] 81  Heart Rate:  [53-81] 81  Resp:  [9-34] 34  BP: (126-141)/(78-90) 126/78  SpO2:  [94 %-100 %] 95 %  119 lbs 6.4 oz    Constitutional: sedate, slightly agitated, no commands  Eyes: Lids and lashes normal, pupils  equal, round and reactive to light, sclera clear, conjunctiva normal.  ENT: Normocephalic, without obvious abnormality, atraumatic, temporal wasting  Respiratory: No increased work of breathing, good air exchange, clear to auscultation bilaterally, no crackles or wheezing.  Cardiovascular: Normal apical impulse, regular rate and rhythm, normal S1 and S2, and no murmur noted.  GI: normal bowel sounds, soft, non-distended, non-tender, no guarding  Genitourinary:  Normal external anatomy, orona, urine yellow and clear  Skin: very dry, nails unkept,   Musculoskeletal: No movement on left noted, no deformities  Neurologic: eyes not open to pain, right side moves very purposefully, no commands  Neuropsychiatric: ANN    Data   Results for orders placed or performed during the hospital encounter of 05/26/17 (from the past 24 hour(s))   CT Head w/o Contrast    Narrative    CT OF THE HEAD WITHOUT CONTRAST 5/26/2017 11:41 AM     COMPARISON: None.    HISTORY: Stroke.    TECHNIQUE: 5 mm thick axial CT images of the head were acquired  without IV contrast material.    FINDINGS: There are small chronic infarcts at the anterolateral aspect  of the right frontal lobe and at the posterolateral aspect of the  right parietal lobe as well as within the lateral aspect of the right  temporooccipital junction. There is moderate diffuse cerebral volume  loss. There are subtle patchy areas of decreased density in the  cerebral white matter bilaterally that are consistent with sequela of  chronic small vessel ischemic disease.    The ventricles and basal cisterns are within normal limits in  configuration given the degree of cerebral volume loss.  There is no  midline shift. There are no extra-axial fluid collections.    No intracranial hemorrhage, mass or recent infarct.    The visualized paranasal sinuses are well-aerated. There is no  mastoiditis. There are no fractures of the visualized bones.      Impression    IMPRESSION: Chronic small  infarcts in the right cerebral hemisphere as  detailed above. Diffuse cerebral volume loss and cerebral white matter  changes consistent with chronic small vessel ischemic disease. No  evidence for acute intracranial pathology.      Radiation dose for this scan was reduced using automated exposure  control, adjustment of the mA and/or kV according to patient size, or  iterative reconstruction technique   CTA Angiogram Head Neck    Narrative    CT ANGIOGRAM OF THE HEAD AND NECK WITHOUT AND WITH CONTRAST  5/26/2017  11:52 AM     COMPARISON: None    HISTORY: Stroke    TECHNIQUE:  Precontrast localizing scans were followed by CT  angiography with an injection of 70 mL Isovue-370 nonionic intravenous  contrast material with scans through the head and neck.  Images were  transferred to a separate 3-D workstation where multiplanar  reformations and 3-D images were created.  Estimates of carotid  stenoses are made relative to the distal internal carotid artery  diameters except as noted.      FINDINGS:   Neck CTA: The common carotid arteries bilaterally are patent without  stenosis. There is age indeterminate complete occlusion of the right  internal carotid artery from the origin to the supraclinoid segment.  The left internal carotid artery is patent without stenosis. The  dominant left and tiny right vertebral arteries are patent without  stenosis.    Head CTA: The basilar, distal left internal carotid, bilateral  anterior cerebral, bilateral middle cerebral and bilateral posterior  cerebral arteries are patent and unremarkable. The anterior  communicating and right posterior communicating arteries are patent  and unremarkable.      Impression    IMPRESSION:  1. Age-indeterminate complete occlusion of the right internal carotid  artery from the origin to the supraclinoid segment. Given chronic  appearing right cerebral hemisphere infarcts, there is likely  long-standing disease in the right internal carotid artery  although  the time of occlusion cannot be determined.  2. Minimal calcified atherosclerotic plaque at the origin of the left  internal carotid artery that does not result in stenosis.  3. Otherwise, normal neck and head CTA.      Radiation dose for this scan was reduced using automated exposure  control, adjustment of the mA and/or kV according to patient size, or  iterative reconstruction technique   CT Head w Contrast    Narrative    CT BRAIN PERFUSION 5/26/2017 11:54 AM    COMPARISON: None    HISTORY: Stroke    TECHNIQUE: Time sequential axial CT images of the head were acquired  during the administration of intravenous contrast (50 mL Isovue-370).  CTA images of the [place] as well as color perfusion maps of  the brain were created from this time sequential axial source data.      Impression    IMPRESSION: There is delayed arrival of the contrast bolus to the  right anterior cerebral and right middle cerebral artery territories  consistent with the known right internal carotid artery occlusion.  This results in mildly decreased cerebral blood flow but increased  cerebral blood volume to the right KELIN and right MCA territories  suggesting vasodilatation of the right KELIN and right MCA territories  to accommodate for decreased cerebral blood flow. There are no  findings to suggest acute ischemia or infarct. Perfusion defects  corresponding to chronic right frontal and right parietal infarcts are  noted. No other perfusion abnormalities.    Radiation dose for this scan was reduced using automated exposure  control, adjustment of the mA and/or kV according to patient size, or  iterative reconstruction technique   Blood gas arterial and oxyhgb   Result Value Ref Range    pH Arterial 7.34 (L) 7.35 - 7.45 pH    pCO2 Arterial 39 35 - 45 mm Hg    pO2 Arterial 151 (H) 80 - 105 mm Hg    Bicarbonate Arterial 21 21 - 28 mmol/L    FIO2 40%     Oxyhemoglobin Arterial 97 92 - 100 %    Base Deficit Art 3.9 mmol/L   Basic  metabolic panel   Result Value Ref Range    Sodium 137 133 - 144 mmol/L    Potassium 4.2 3.4 - 5.3 mmol/L    Chloride 105 94 - 109 mmol/L    Carbon Dioxide 22 20 - 32 mmol/L    Anion Gap 10 3 - 14 mmol/L    Glucose 156 (H) 70 - 99 mg/dL    Urea Nitrogen 15 7 - 30 mg/dL    Creatinine 1.20 0.66 - 1.25 mg/dL    GFR Estimate 61 >60 mL/min/1.7m2    GFR Estimate If Black 74 >60 mL/min/1.7m2    Calcium 7.7 (L) 8.5 - 10.1 mg/dL   CBC with platelets differential   Result Value Ref Range    WBC 12.2 (H) 4.0 - 11.0 10e9/L    RBC Count 3.90 (L) 4.4 - 5.9 10e12/L    Hemoglobin 11.8 (L) 13.3 - 17.7 g/dL    Hematocrit 35.8 (L) 40.0 - 53.0 %    MCV 92 78 - 100 fl    MCH 30.3 26.5 - 33.0 pg    MCHC 33.0 31.5 - 36.5 g/dL    RDW 14.4 10.0 - 15.0 %    Platelet Count 236 150 - 450 10e9/L    Diff Method Automated Method     % Neutrophils 84.6 %    % Lymphocytes 10.8 %    % Monocytes 3.7 %    % Eosinophils 0.2 %    % Basophils 0.2 %    % Immature Granulocytes 0.5 %    Nucleated RBCs 0 0 /100    Absolute Neutrophil 10.3 (H) 1.6 - 8.3 10e9/L    Absolute Lymphocytes 1.3 0.8 - 5.3 10e9/L    Absolute Monocytes 0.5 0.0 - 1.3 10e9/L    Absolute Eosinophils 0.0 0.0 - 0.7 10e9/L    Absolute Basophils 0.0 0.0 - 0.2 10e9/L    Abs Immature Granulocytes 0.1 0 - 0.4 10e9/L    Absolute Nucleated RBC 0.0    INR   Result Value Ref Range    INR 1.10 0.86 - 1.14   Lactic acid whole blood   Result Value Ref Range    Lactic Acid 3.6 (H) 0.7 - 2.1 mmol/L   Partial thromboplastin time   Result Value Ref Range    PTT 28 22 - 37 sec   Blood culture   Result Value Ref Range    Specimen Description Blood Right Arm     Special Requests Aerobic and anaerobic bottles received     Culture Micro No growth after 1 hour     Micro Report Status Pending    MR Brain w/o Contrast    Narrative    MRI OF THE BRAIN WITHOUT CONTRAST 5/26/2017 12:51 PM     COMPARISON: Head CT same day.    HISTORY:  Evaluation right internal carotid artery occlusion.    TECHNIQUE:   Brain: Axial  diffusion-weighted with ADC map, T2-weighted with fat  saturation, T1-weighted and turboFLAIR and coronal T1-weighted images  of the brain were obtained without intravenous contrast.     FINDINGS: There are recent, though not definitely acute, border zone  infarcts in the parasagittal aspect of the high right cerebral  hemisphere involving portions of the right frontal and right parietal  lobes. These are consistent with the patient's known right internal  carotid artery occlusion.    Chronic infarcts in the right frontal and right parietal lobes are  again noted.    There is moderate diffuse cerebral volume loss. There are numerous  scattered focal and patchy periventricular areas of abnormal T2 signal  hyperintensity in the cerebral white matter bilaterally that are  consistent with sequela of chronic small vessel ischemic disease. The  ventricles and basal cisterns are within normal limits in  configuration given the degree of cerebral volume loss.  There is no  midline shift.  There are no extra-axial fluid collections.  There is  no evidence for acute intracranial hemorrhage.    There is no sinusitis or mastoiditis.      Impression    IMPRESSION:    1. Probable recent although not definitely acute border zone infarcts  in the high right cerebral hemisphere.  2. No definite acute infarcts noted.  3. Diffuse cerebral volume loss and cerebral white matter changes  consistent with chronic small vessel ischemic disease.

## 2017-05-27 NOTE — PROGRESS NOTES
Patient is responsive to pain. Patient does not follow commands. Bear hugger on patient to maintain normo-temperature.   NSR.   Hypertensive with IV hydralazine given to maintain systolic <140.   Diaz in place. U/O >100/HR  Tube feed running at 15 with goal of 40/HR. Q 4 hour flush.   Report given to oncoming nurse. WIll continue to monitor. Will continue with plan of care.

## 2017-05-27 NOTE — PROGRESS NOTES
[facility]    Daily Progress Note  Critical Care Service     Date of Admission:  5/26/2017  Date of Service (when I saw the patient): May 27, 2017      Assessment & Plan   Jair Fernández is a 66 year old male who presents with AMS.  EMS  Found pt unresponsive outside on the ground of a local [employer]. Brought to the ED  The patient was also hypotensive with systolic pressures in the 70s.  Intubated there.  Per pt's sister he has a history of neck cancer and surgery, heavy EtOH use, smoker. Overnight weaned off pressors, now hypertensive, EEG shoed no seizure, MRI did not show obvious acute stroke.    Neuro  1. AMS, question of etiology, no seizures on EEG  2. Left sided weakness   3. Hx multiple strokes and chronic right carotid occlusion per MRI  Plan:  -- Neuro cares managed by Neurology  -- Wean sedation and assess MS      CV  1. Hypotension resolved, lactate remains mildly elevated  Plan:  -- Most likely hypovolemic.  Keep MAP > 65, fluids as needed to achieve this  -- Cont IV fluids    Resp:  1. Acute respiratory failure, per report, pt intubated 2nd to emesis and AMS in ED  Plan:  -- CXR ordered to check ETT placement now  -- AC ventilation 16* 450 +5  -- PST later today, and daily   -- Empiric abx for aspiration    GI/Nutrition  1. Malnutrition, raul/pro deficit  Plan:  -- PPI  -- On TF    Renal  No known hx.  Creatinine 1.2 on admit, now 0.8  Plan:  -- Monitor  -- Check UA and urine tox    Endocrine  1. Hyperglycemia  Plan:  --  Insulin gtt per protocol if indicated  --  Keep BG  <180 for optimal healing    Heme:  1. Leukocytosis  Plan:  -- Monitor for other s/s infection    ID  1. ? Sepsis with elevated WBC, lactate, no clear source, procal neg  -Empiric zosyn for possible aspiration pneumonia.      General cares:  DVT Prophylaxis: Pneumatic Compression Devices, heparin sq  GI Prophylaxis: PPI  Restraints: Restraints for medical healing needed: YES  Family update by me today: No.  ROSMERY  informed of lack of family/hx, will look into it.  Current lines are required for patient management  Access:  PIV, will ask for PICC emergently as unable to get more access.      David Morris Perlman    Time Spent on this Encounter   Billing:  I spent 45 minutes bedside and on the inpatient unit today managing the critical care of Jair Fernández in relation to the issues listed in this note.    Code Status   DNR per discussion with patients sister    Primary Care Physician   None Doctor    Chief Complaint   AMS    Interval History last 24h  No events overnight, no seizure on EEG, sedated on propofol.  DNR per discussion with patient's daughter    Review of Systems   Review of systems not obtained due to patient factors - intubation and sedation    Physical Exam   Temp: 99.1  F (37.3  C) Temp src: Temporal Temp  Min: 94.3  F (34.6  C)  Max: 99.9  F (37.7  C) BP: 119/60 Pulse: 81 Heart Rate: 87 Resp: 21 SpO2: 98 % O2 Device: Mechanical Ventilator    Vital Signs with Ranges  Temp:  [94.3  F (34.6  C)-99.9  F (37.7  C)] 99.1  F (37.3  C)  Pulse:  [81] 81  Heart Rate:  [51-99] 87  Resp:  [7-34] 21  BP: ()/(35-98) 119/60  FiO2 (%):  [40 %-50 %] 40 %  SpO2:  [91 %-100 %] 98 %  119 lbs 6.4 oz    Constitutional: sedated, intubated  Eyes:  pupils equal, round and reactive to light, sclera clear, conjunctiva normal.  Respiratory:  clear to auscultation bilaterally, no crackles or wheezing.  Cardiovascular: Normal apical impulse, regular rate and rhythm, normal S1 and S2, and no murmur noted.  GI: normal bowel sounds, soft, non-distended, non-tender, no guarding  Genitourinary:  Normal external anatomy, orona, urine yellow and clear  Skin: very dry, nails unkept,   Musculoskeletal: MC but weaker on left, still with decreased LOC,   Neurologic: eyes not open to pain, right side moves very purposefully, no commands  Neuropsychiatric: ANN    Data   Results for orders placed or performed during the hospital encounter of  05/26/17 (from the past 24 hour(s))   CT Head w/o Contrast    Narrative    CT OF THE HEAD WITHOUT CONTRAST 5/26/2017 11:41 AM     COMPARISON: None.    HISTORY: Stroke.    TECHNIQUE: 5 mm thick axial CT images of the head were acquired  without IV contrast material.    FINDINGS: There are small chronic infarcts at the anterolateral aspect  of the right frontal lobe and at the posterolateral aspect of the  right parietal lobe as well as within the lateral aspect of the right  temporooccipital junction. There is moderate diffuse cerebral volume  loss. There are subtle patchy areas of decreased density in the  cerebral white matter bilaterally that are consistent with sequela of  chronic small vessel ischemic disease.    The ventricles and basal cisterns are within normal limits in  configuration given the degree of cerebral volume loss.  There is no  midline shift. There are no extra-axial fluid collections.    No intracranial hemorrhage, mass or recent infarct.    The visualized paranasal sinuses are well-aerated. There is no  mastoiditis. There are no fractures of the visualized bones.      Impression    IMPRESSION: Chronic small infarcts in the right cerebral hemisphere as  detailed above. Diffuse cerebral volume loss and cerebral white matter  changes consistent with chronic small vessel ischemic disease. No  evidence for acute intracranial pathology.      Radiation dose for this scan was reduced using automated exposure  control, adjustment of the mA and/or kV according to patient size, or  iterative reconstruction technique    MILLI ODOM MD   CTA Angiogram Head Neck    Narrative    CT ANGIOGRAM OF THE HEAD AND NECK WITHOUT AND WITH CONTRAST  5/26/2017  11:52 AM     COMPARISON: None    HISTORY: Stroke    TECHNIQUE:  Precontrast localizing scans were followed by CT  angiography with an injection of 70 mL Isovue-370 nonionic intravenous  contrast material with scans through the head and neck.  Images  were  transferred to a separate 3-D workstation where multiplanar  reformations and 3-D images were created.  Estimates of carotid  stenoses are made relative to the distal internal carotid artery  diameters except as noted.      FINDINGS:   Neck CTA: The common carotid arteries bilaterally are patent without  stenosis. There is age indeterminate complete occlusion of the right  internal carotid artery from the origin to the supraclinoid segment.  The left internal carotid artery is patent without stenosis. The  dominant left and tiny right vertebral arteries are patent without  stenosis.    Head CTA: The basilar, distal left internal carotid, bilateral  anterior cerebral, bilateral middle cerebral and bilateral posterior  cerebral arteries are patent and unremarkable. The anterior  communicating and right posterior communicating arteries are patent  and unremarkable.      Impression    IMPRESSION:  1. Age-indeterminate complete occlusion of the right internal carotid  artery from the origin to the supraclinoid segment. Given chronic  appearing right cerebral hemisphere infarcts, there is likely  long-standing disease in the right internal carotid artery although  the time of occlusion cannot be determined.  2. Minimal calcified atherosclerotic plaque at the origin of the left  internal carotid artery that does not result in stenosis.  3. Otherwise, normal neck and head CTA.      Radiation dose for this scan was reduced using automated exposure  control, adjustment of the mA and/or kV according to patient size, or  iterative reconstruction technique    MILLI ODOM MD   CT Head w Contrast    Narrative    CT BRAIN PERFUSION 5/26/2017 11:54 AM    COMPARISON: None    HISTORY: Stroke    TECHNIQUE: Time sequential axial CT images of the head were acquired  during the administration of intravenous contrast (50 mL Isovue-370).  CTA images of the [place] as well as color perfusion maps of  the brain were created  from this time sequential axial source data.      Impression    IMPRESSION: There is delayed arrival of the contrast bolus to the  right anterior cerebral and right middle cerebral artery territories  consistent with the known right internal carotid artery occlusion.  This results in mildly decreased cerebral blood flow but increased  cerebral blood volume to the right KELIN and right MCA territories  suggesting vasodilatation of the right KELIN and right MCA territories  to accommodate for decreased cerebral blood flow. There are no  findings to suggest acute ischemia or infarct. Perfusion defects  corresponding to chronic right frontal and right parietal infarcts are  noted. No other perfusion abnormalities.    Radiation dose for this scan was reduced using automated exposure  control, adjustment of the mA and/or kV according to patient size, or  iterative reconstruction technique    MILLI ODOM MD   Blood gas arterial and oxyhgb   Result Value Ref Range    pH Arterial 7.34 (L) 7.35 - 7.45 pH    pCO2 Arterial 39 35 - 45 mm Hg    pO2 Arterial 151 (H) 80 - 105 mm Hg    Bicarbonate Arterial 21 21 - 28 mmol/L    FIO2 40%     Oxyhemoglobin Arterial 97 92 - 100 %    Base Deficit Art 3.9 mmol/L   Basic metabolic panel   Result Value Ref Range    Sodium 137 133 - 144 mmol/L    Potassium 4.2 3.4 - 5.3 mmol/L    Chloride 105 94 - 109 mmol/L    Carbon Dioxide 22 20 - 32 mmol/L    Anion Gap 10 3 - 14 mmol/L    Glucose 156 (H) 70 - 99 mg/dL    Urea Nitrogen 15 7 - 30 mg/dL    Creatinine 1.20 0.66 - 1.25 mg/dL    GFR Estimate 61 >60 mL/min/1.7m2    GFR Estimate If Black 74 >60 mL/min/1.7m2    Calcium 7.7 (L) 8.5 - 10.1 mg/dL   CBC with platelets differential   Result Value Ref Range    WBC 12.2 (H) 4.0 - 11.0 10e9/L    RBC Count 3.90 (L) 4.4 - 5.9 10e12/L    Hemoglobin 11.8 (L) 13.3 - 17.7 g/dL    Hematocrit 35.8 (L) 40.0 - 53.0 %    MCV 92 78 - 100 fl    MCH 30.3 26.5 - 33.0 pg    MCHC 33.0 31.5 - 36.5 g/dL    RDW 14.4 10.0 -  15.0 %    Platelet Count 236 150 - 450 10e9/L    Diff Method Automated Method     % Neutrophils 84.6 %    % Lymphocytes 10.8 %    % Monocytes 3.7 %    % Eosinophils 0.2 %    % Basophils 0.2 %    % Immature Granulocytes 0.5 %    Nucleated RBCs 0 0 /100    Absolute Neutrophil 10.3 (H) 1.6 - 8.3 10e9/L    Absolute Lymphocytes 1.3 0.8 - 5.3 10e9/L    Absolute Monocytes 0.5 0.0 - 1.3 10e9/L    Absolute Eosinophils 0.0 0.0 - 0.7 10e9/L    Absolute Basophils 0.0 0.0 - 0.2 10e9/L    Abs Immature Granulocytes 0.1 0 - 0.4 10e9/L    Absolute Nucleated RBC 0.0    INR   Result Value Ref Range    INR 1.10 0.86 - 1.14   Lactic acid whole blood   Result Value Ref Range    Lactic Acid 3.6 (H) 0.7 - 2.1 mmol/L   Partial thromboplastin time   Result Value Ref Range    PTT 28 22 - 37 sec   Blood culture   Result Value Ref Range    Specimen Description Blood Right Arm     Special Requests Aerobic and anaerobic bottles received     Culture Micro No growth after 18 hours     Micro Report Status Pending    Magnesium   Result Value Ref Range    Magnesium 1.9 1.6 - 2.3 mg/dL   Phosphorus   Result Value Ref Range    Phosphorus 3.4 2.5 - 4.5 mg/dL   MR Brain w/o Contrast    Narrative    MRI OF THE BRAIN WITHOUT CONTRAST 5/26/2017 12:51 PM     COMPARISON: Head CT same day.    HISTORY:  Evaluation right internal carotid artery occlusion.    TECHNIQUE:   Brain: Axial diffusion-weighted with ADC map, T2-weighted with fat  saturation, T1-weighted and turboFLAIR and coronal T1-weighted images  of the brain were obtained without intravenous contrast.     FINDINGS: There are recent, though not definitely acute, border zone  infarcts in the parasagittal aspect of the high right cerebral  hemisphere involving portions of the right frontal and right parietal  lobes. These are consistent with the patient's known right internal  carotid artery occlusion.    Chronic infarcts in the right frontal and right parietal lobes are  again noted.    There is  moderate diffuse cerebral volume loss. There are numerous  scattered focal and patchy periventricular areas of abnormal T2 signal  hyperintensity in the cerebral white matter bilaterally that are  consistent with sequela of chronic small vessel ischemic disease. The  ventricles and basal cisterns are within normal limits in  configuration given the degree of cerebral volume loss.  There is no  midline shift.  There are no extra-axial fluid collections.  There is  no evidence for acute intracranial hemorrhage.    There is no sinusitis or mastoiditis.      Impression    IMPRESSION:    1. Probable recent although not definitely acute border zone infarcts  in the high right cerebral hemisphere.  2. No definite acute infarcts noted.  3. Diffuse cerebral volume loss and cerebral white matter changes  consistent with chronic small vessel ischemic disease.    MILLI ODOM MD   Nutrition Services Adult IP Consult    Narrative    Xiao Fermin RD, LD     5/26/2017  2:58 PM  CLINICAL NUTRITION SERVICES  -  ASSESSMENT NOTE    Recommendations Ordered by Registered Dietitian (RD):   Recommend TF: Peptamen Intense VHP @ 40 mL/hr x 24 hours (960 mL)   to provide 960 kcal, 89 g pro (1.6 g/kg), 75 g CHO, 3.8 g Fiber,   806 mL free H2O.   - 60 mL fluid flush q 4 hrs for tube patency     Total (TF + D5 + Prop) = 1534 kcal (28 kcal/kg), 89 g pro (1.6   g/kg)    Begin TF @ 15 ml/hr. After 12 hours with good tolerance and Phos   >1.9, begin advancement 15 ml q 12 hours until goal is met.    Malnutrition: Based on physical assessment pt likely with severe   malnutrition  In Context of:  Acute illness or injury  Chronic illness or disease     REASON FOR ASSESSMENT  Jair Fernández is a 66 year old male seen by Registered   Dietitian for Provider Order - Registered Dietitian to Assess and   Recommend TF.  Provider responsible for entering TF orders.   Verbal order received for RD to enter TF orders, tube not yet   placed, likely gastric.  "    NUTRITION HISTORY  - No medical history available. Pt intubated, sedated unable to   obtain nutrition history.     CURRENT NUTRITION ORDERS  Diet Order:     NPO     Current Intake/Tolerance:  NA    PHYSICAL FINDINGS  Observed  Pt covered up to shoulders with blanket - observed face,   shoulders, chest areas. Bones pronounced in shoulder/chest  Muscle Wasting - facial, shoulders, clavicle, severe wasting  Subcutaneous fat loss - facial, chest area, severe wasting  Obtained from Chart/Interdisciplinary Team  Appears cachectic   FT to be placed - likely gastric    ANTHROPOMETRICS  Height: 5' 5\"  Weight: 119 lbs 6.4 oz (54.2 kg)  Body mass index is 19.87 kg/(m^2).  Weight Status:  Normal BMI  IBW: 56.8 kg  % IBW: 95%  Weight History:   Wt Readings from Last 10 Encounters:   05/26/17 54.2 kg (119 lb 6.4 oz)       LABS  Labs reviewed  K 4.2 (NL)  BGs 156, 196 - trending high    MEDICATIONS  Medications reviewed  D5 @ 100 ml/hr -> 120 g CHO, 408 kcal daily from dextrose   Propofol @ 3.3 ml/hr --> 166 kcal daily from lipid  Electrolyte replacement protocols ordered    Dosing Weight 54.2 kg - admit    ASSESSED NUTRITION NEEDS PER APPROVED PRACTICE GUIDELINES:  Estimated Energy Needs: 9356-9055 kcals (25-30 Kcal/Kg)  Justification: repletion  Estimated Protein Needs:  grams protein (1.5-2 g pro/Kg)  Justification: Repletion  Estimated Fluid Needs: 2985-1788 mL (1 mL/Kcal)  Justification: maintenance    MALNUTRITION:  % Weight Loss:  No weight history on file  % Intake:  Unable to evaluate  Subcutaneous Fat Loss:  Orbital region severe depletion and Upper   arm region severe depletion  Muscle Loss:  Temporal region severe depletion, Clavicle bone   region severe depletion and Acromion bone region severe depletion  Fluid Retention:  None noted    Malnutrition Diagnosis: Severe malnutrition  In Context of:  Acute illness or injury  Chronic illness or disease    NUTRITION DIAGNOSIS:  Inadequate protein-energy intake " related to inability for PO 2/2   intubation as evidenced by plan for TF as pt currently meeting   ~40% est energy needs and 0% protein needs via D5 and Propofol   administration.      NUTRITION INTERVENTIONS  Recommendations / Nutrition Prescription  Recommend TF: Peptamen Intense VHP @ 40 mL/hr x 24 hours (960 mL)   to provide 960 kcal, 89 g pro (1.6 g/kg), 75 g CHO, 3.8 g Fiber,   806 mL free H2O.   - 60 mL fluid flush q 4 hrs for tube patency     Total (TF + D5 + Prop) = 1534 kcal (28 kcal/kg), 89 g pro (1.6   g/kg)    Begin TF @ 15 ml/hr. After 12 hours with good tolerance and Phos   >1.9, begin advancement 15 ml q 12 hours until goal is met.     Implementation  Nutrition education: Not appropriate at this time due to patient   condition  EN Composition, EN Schedule and Feeding Tube Flush: ordered in   EPIC per Verbal order as above.     Nutrition Goals  Peptamen Intense VHP + Prop + D5 to meet % estimated needs   in the next 48-60 hours.      MONITORING AND EVALUATION:  Progress towards goals will be monitored and evaluated per   protocol and Practice Guidelines    Xiao Fermin, RD, LD             Glucose by meter   Result Value Ref Range    Glucose 196 (H) 70 - 99 mg/dL   UA with Microscopic reflex to Culture   Result Value Ref Range    Color Urine Light Yellow     Appearance Urine Clear     Glucose Urine 70 (A) NEG mg/dL    Bilirubin Urine Negative NEG    Ketones Urine Negative NEG mg/dL    Specific Gravity Urine 1.015 1.003 - 1.035    Blood Urine Negative NEG    pH Urine 7.0 5.0 - 7.0 pH    Protein Albumin Urine Negative NEG mg/dL    Urobilinogen mg/dL Normal 0.0 - 2.0 mg/dL    Nitrite Urine Negative NEG    Leukocyte Esterase Urine Negative NEG    Source Catheterized Urine     WBC Urine <1 0 - 2 /HPF    RBC Urine 0 0 - 2 /HPF    Mucous Urine Present (A) NEG /LPF    Hyaline Casts 1 0 - 2 /LPF   Drug abuse screen 8 urine (UR)   Result Value Ref Range    Amphetamine Qual Urine (A) NEG     Canceled, Test  credited   Incorrectly ordered by PCU/Clinic  [facility] IN [facility]US TO ORDER CORRECT TEST. 1530 DD      Barbiturates Qual Urine (A) NEG     Canceled, Test credited   Incorrectly ordered by PCU/Clinic  [facility] IN [facility] TO ORDER CORRECT TEST. 1530 DD      Benzodiazepine Qual Urine (A) NEG     Canceled, Test credited   Incorrectly ordered by PCU/Clinic  [facility] IN [facility] TO ORDER CORRECT TEST. 1530 DD      Cannabinoids Qual Urine (A) NEG     Canceled, Test credited   Incorrectly ordered by PCU/Clinic  [facility] IN [facility] TO ORDER CORRECT TEST. 1530 DD      Cocaine Qual Urine (A) NEG     Canceled, Test credited   Incorrectly ordered by PCU/Clinic  [facility] IN ICUS TO ORDER CORRECT TEST. 1530 DD      Ethanol Qual Urine (A) NEG     Canceled, Test credited   Incorrectly ordered by PCU/Clinic  [facility] IN ICUS TO ORDER CORRECT TEST. 1530 DD      Opiates Qualitative Urine (A) NEG     Canceled, Test credited   Incorrectly ordered by PCU/Clinic  [facility] IN ICUS TO ORDER CORRECT TEST. 1530 DD      PCP Qual Urine (A) NEG     Canceled, Test credited   Incorrectly ordered by PCU/Clinic  [facility] IN ICUS TO ORDER CORRECT TEST. 1530 DD     Drug abuse screen 77 urine   Result Value Ref Range    Amphetamine Qual Urine  NEG     Negative   Cutoff for a negative amphetamine is 500 ng/mL or less.      Barbiturates Qual Urine  NEG     Negative   Cutoff for a negative barbiturate is 200 ng/mL or less.      Benzodiazepine Qual Urine (A) NEG     Positive   Cutoff for a positive benzodiazepine is greater than 200 ng/mL. This is an   unconfirmed screening result to be used for medical purposes only.      Cannabinoids Qual Urine  NEG     Negative   Cutoff for a negative cannabinoid is 50 ng/mL or less.      Cocaine Qual Urine  NEG     Negative   Cutoff for a negative cocaine is 300 ng/mL or less.      Opiates Qualitative Urine  NEG     Negative   Cutoff for a negative opiate is 300 ng/mL or less.      PCP Qual Urine  NEG     Negative   Cutoff for a negative PCP  is 25 ng/mL or less.     EKG 12-lead, tracing only   Result Value Ref Range    Interpretation ECG Click View Image link to view waveform and result    XR Chest Port 1 View    Narrative    CHEST ONE VIEW PORTABLE   5/26/2017 4:35 PM     HISTORY: Endotracheal tube placement.    COMPARISON: None.      Impression    IMPRESSION: Endotracheal tube is in place, with tip at the level of  the aramis directed toward the right mainstem bronchus. This  endotracheal tube should be pulled back approximately 3 cm for more  optimal positioning. Enteric tube is in place, tip not seen, but  sidehole appears to be above the gastroesophageal junction. Right PICC  line is in place, with tip near the SVC/RA junction. The lungs appear  clear where visualized. The lung bases are not entirely included on  this exam. No evidence for pneumothorax.     SHARON ZEPEDA MD   Blood culture   Result Value Ref Range    Specimen Description Blood PICC Right     Special Requests Aerobic and anaerobic bottles received     Culture Micro No growth after 11 hours     Micro Report Status Pending    Hepatic panel   Result Value Ref Range    Bilirubin Direct 0.1 0.0 - 0.2 mg/dL    Bilirubin Total 0.4 0.2 - 1.3 mg/dL    Albumin 2.8 (L) 3.4 - 5.0 g/dL    Protein Total 6.4 (L) 6.8 - 8.8 g/dL    Alkaline Phosphatase 54 40 - 150 U/L    ALT 18 0 - 70 U/L    AST 22 0 - 45 U/L   Lactic acid whole blood   Result Value Ref Range    Lactic Acid 4.1 (HH) 0.7 - 2.1 mmol/L   Magnesium   Result Value Ref Range    Magnesium 1.9 1.6 - 2.3 mg/dL   Phosphorus   Result Value Ref Range    Phosphorus 3.3 2.5 - 4.5 mg/dL   Procalcitonin level   Result Value Ref Range    Procalcitonin  ng/ml     <0.05  <0.05 ng/ml  Normal  Recommendation: Very low risk of bacterial infection.   Discourage antibiotics unless strong clinical suspicion for serious infection.     Troponin I   Result Value Ref Range    Troponin I ES  0.000 - 0.045 ug/L     <0.015  The 99th percentile for upper  reference range is 0.045 ug/L.  Troponin values in   the range of 0.045 - 0.120 ug/L may be associated with risks of adverse   clinical events.     Methicillin Resistant Staph Aureus PCR   Result Value Ref Range    Specimen Description Nares     S Aur Meth Resis PCR  NEG     Negative  MRSA Negative: SA Positive  MRSA target DNA not detected, presumed negative for MRSA colonization or the   number of bacteria present may be below the limit of detection.  Staphylococcus aureus target DNA detected, presumed positive for SA   colonization.  A positive test does not necessarily indicate the presence of viable organisms.   It is, however, presumptive for the presence of SA.  This result does not   preclude MRSA nasal colonization.  FDA approved assay performed using [employer] GeneXpert(R) real-time PCR.     Lactic acid whole blood   Result Value Ref Range    Lactic Acid 2.7 (H) 0.7 - 2.1 mmol/L   Blood gas arterial and oxyhgb   Result Value Ref Range    pH Arterial 7.44 7.35 - 7.45 pH    pCO2 Arterial 32 (L) 35 - 45 mm Hg    pO2 Arterial 111 (H) 80 - 105 mm Hg    Bicarbonate Arterial 22 21 - 28 mmol/L    Oxyhemoglobin Arterial 98 92 - 100 %    Base Deficit Art 1.9 mmol/L   Glucose by meter   Result Value Ref Range    Glucose 176 (H) 70 - 99 mg/dL   Comprehensive metabolic panel   Result Value Ref Range    Sodium 135 133 - 144 mmol/L    Potassium 3.9 3.4 - 5.3 mmol/L    Chloride 103 94 - 109 mmol/L    Carbon Dioxide 25 20 - 32 mmol/L    Anion Gap 7 3 - 14 mmol/L    Glucose 163 (H) 70 - 99 mg/dL    Urea Nitrogen 12 7 - 30 mg/dL    Creatinine 0.83 0.66 - 1.25 mg/dL    GFR Estimate >90  Non  GFR Calc   >60 mL/min/1.7m2    GFR Estimate If Black >90   GFR Calc   >60 mL/min/1.7m2    Calcium 8.3 (L) 8.5 - 10.1 mg/dL    Bilirubin Total 0.5 0.2 - 1.3 mg/dL    Albumin 3.2 (L) 3.4 - 5.0 g/dL    Protein Total 6.7 (L) 6.8 - 8.8 g/dL    Alkaline Phosphatase 52 40 - 150 U/L    ALT 17 0 - 70 U/L    AST 25 0 -  45 U/L   CBC with platelets   Result Value Ref Range    WBC 20.7 (H) 4.0 - 11.0 10e9/L    RBC Count 3.93 (L) 4.4 - 5.9 10e12/L    Hemoglobin 11.9 (L) 13.3 - 17.7 g/dL    Hematocrit 35.7 (L) 40.0 - 53.0 %    MCV 91 78 - 100 fl    MCH 30.3 26.5 - 33.0 pg    MCHC 33.3 31.5 - 36.5 g/dL    RDW 14.5 10.0 - 15.0 %    Platelet Count 237 150 - 450 10e9/L   Magnesium   Result Value Ref Range    Magnesium 1.8 1.6 - 2.3 mg/dL   Phosphorus   Result Value Ref Range    Phosphorus 2.9 2.5 - 4.5 mg/dL   Lactic acid whole blood (AM Draw)   Result Value Ref Range    Lactic Acid 3.4 (H) 0.7 - 2.1 mmol/L

## 2017-05-27 NOTE — PROGRESS NOTES
Patient hypertensive, off dopamine. Does not appear restless or dysynchronous with ventilator. Given that he is getting frequent neuro checks, will not sedate him. Ordered 5 mg of Hydralazine q 6 hours PRN for SBP > 140 mmHg.       Miguel Edwards

## 2017-05-27 NOTE — PROGRESS NOTES
VSS, pt's sedation was turned off for a few hours and he followed commends, weaker on the left side. He was very lethargic and not a candidate for extubation. Tube feeding increased to 30 cc/h, rated needs to go up to 40cc/h at 0400 on 5/28. Sister called and updated w plan of care. Placed back in sedation due to increase restless.

## 2017-05-27 NOTE — PROGRESS NOTES
[facility]    Neurology Progress Note    Date of Service (when I saw the patient): 05/27/2017     Today's developments:  EEG showed no seizures or epileptiform discharges.  Still sedated, intubated.    Assessment & Plan   Jair Fernández is a 66 year old male who was admitted on 5/26/2017 after being found unresponsive on the ground outside a gas station.  He was hypotensive as well.  No medical history was available- later found that he has history of neck cancer s/p surgery, etoh use.  Not known if he takes any regular medications.  No known seizure disorder.    (G40.909) Recurrent seizures (H)  --received 4 mg of Ativan  --Will load with fosphenytoin  --MRI brain showed recent but not acute small watershed infarcts on right  --EEG showed right attenuation, some left frontotemporal sharply contoured activity, but otherwise was unremarkable with no seizures  --Scheduled fosphenytoin for now.  --will get pht level for tomorrow 5-28 am.   (I63.9) Cerebrovascular accident (CVA), unspecified mechanism (H)  (primary encounter diagnosis)  --Previous multiple strokes  --Right ICA occlusion is likely chronic, given chronic right sided strokes  (J96.01) Acute respiratory failure with hypoxia (H)  --Intubated for airway protection  --Management per medical intensivists.   --ok to wean off sedation from neurological standpoint.  #. DVT Prophylaxis  --Mechanical only  #. PT/OT/Speech  --Start  evaluations  #. Nutrition / GI Prophylaxis  --Per recommendations of speech therapy    Neurocritical care time: 40 minutes Patient is critically ill and has a high risk of neurological deterioration.     Chelsea James MD          Interval History   Intubated, sedated.  EEG done yesterday showed no seizures.     Physical Exam   Temp: 99.1  F (37.3  C) Temp src: Temporal BP: 119/60 Pulse: 81 Heart Rate: 87 Resp: 21 SpO2: 98 % O2 Device: Mechanical Ventilator    Vitals:    05/26/17 1129   Weight: 54.2 kg (119  lb 6.4 oz)     Vital Signs with Ranges  Temp:  [94.3  F (34.6  C)-99.9  F (37.7  C)] 99.1  F (37.3  C)  Pulse:  [81] 81  Heart Rate:  [51-99] 87  Resp:  [7-34] 21  BP: ()/(35-98) 119/60  FiO2 (%):  [40 %-50 %] 40 %  SpO2:  [91 %-100 %] 98 %  I/O last 3 completed shifts:  In: 3271.3 [I.V.:1651.3; NG/GT:60; IV Piggyback:1000]  Out: 2455 [Urine:2455]      General Appearance:  No acute distress  Neuro:       Mental Status Exam:   Intubated, sedated, rare spontaneous movement of limbs, withdraws x4 to noxious stim.       Cranial Nerves:   Eyes disconjugate, pupils nonreactive          Motor:   Withdraws to noxious stim x4  CV: RRR nl s1, s2  Resp: CTAB    Extremities: No clubbing, no cyanosis, no edema    Medications     - MEDICATION INSTRUCTIONS -       dextrose 5% and 0.45% NaCl + KCl 20 mEq/L 100 mL/hr at 05/27/17 0800     IV fluid REPLACEMENT ONLY       propofol (DIPRIVAN) infusion 40 mcg/kg/min (05/27/17 0920)       chlorhexidine  15 mL Mouth/Throat Q12H     sodium chloride (PF)  10 mL Intracatheter Q8H     heparin  5,000 Units Subcutaneous Q8H     thiamine  250 mg Intravenous Daily    Followed by     [START ON 5/31/2017] thiamine  200 mg Oral or Feeding Tube Daily     albuterol  2.5 mg Nebulization Q6H       Data   Results for orders placed or performed during the hospital encounter of 05/26/17 (from the past 24 hour(s))   CT Head w/o Contrast    Narrative    CT OF THE HEAD WITHOUT CONTRAST 5/26/2017 11:41 AM     COMPARISON: None.    HISTORY: Stroke.    TECHNIQUE: 5 mm thick axial CT images of the head were acquired  without IV contrast material.    FINDINGS: There are small chronic infarcts at the anterolateral aspect  of the right frontal lobe and at the posterolateral aspect of the  right parietal lobe as well as within the lateral aspect of the right  temporooccipital junction. There is moderate diffuse cerebral volume  loss. There are subtle patchy areas of decreased density in the  cerebral white matter  bilaterally that are consistent with sequela of  chronic small vessel ischemic disease.    The ventricles and basal cisterns are within normal limits in  configuration given the degree of cerebral volume loss.  There is no  midline shift. There are no extra-axial fluid collections.    No intracranial hemorrhage, mass or recent infarct.    The visualized paranasal sinuses are well-aerated. There is no  mastoiditis. There are no fractures of the visualized bones.      Impression    IMPRESSION: Chronic small infarcts in the right cerebral hemisphere as  detailed above. Diffuse cerebral volume loss and cerebral white matter  changes consistent with chronic small vessel ischemic disease. No  evidence for acute intracranial pathology.      Radiation dose for this scan was reduced using automated exposure  control, adjustment of the mA and/or kV according to patient size, or  iterative reconstruction technique    MILLI ODOM MD   CTA Angiogram Head Neck    Narrative    CT ANGIOGRAM OF THE HEAD AND NECK WITHOUT AND WITH CONTRAST  5/26/2017  11:52 AM     COMPARISON: None    HISTORY: Stroke    TECHNIQUE:  Precontrast localizing scans were followed by CT  angiography with an injection of 70 mL Isovue-370 nonionic intravenous  contrast material with scans through the head and neck.  Images were  transferred to a separate 3-D workstation where multiplanar  reformations and 3-D images were created.  Estimates of carotid  stenoses are made relative to the distal internal carotid artery  diameters except as noted.      FINDINGS:   Neck CTA: The common carotid arteries bilaterally are patent without  stenosis. There is age indeterminate complete occlusion of the right  internal carotid artery from the origin to the supraclinoid segment.  The left internal carotid artery is patent without stenosis. The  dominant left and tiny right vertebral arteries are patent without  stenosis.    Head CTA: The basilar, distal left internal  carotid, bilateral  anterior cerebral, bilateral middle cerebral and bilateral posterior  cerebral arteries are patent and unremarkable. The anterior  communicating and right posterior communicating arteries are patent  and unremarkable.      Impression    IMPRESSION:  1. Age-indeterminate complete occlusion of the right internal carotid  artery from the origin to the supraclinoid segment. Given chronic  appearing right cerebral hemisphere infarcts, there is likely  long-standing disease in the right internal carotid artery although  the time of occlusion cannot be determined.  2. Minimal calcified atherosclerotic plaque at the origin of the left  internal carotid artery that does not result in stenosis.  3. Otherwise, normal neck and head CTA.      Radiation dose for this scan was reduced using automated exposure  control, adjustment of the mA and/or kV according to patient size, or  iterative reconstruction technique    MILLI ODOM MD   CT Head w Contrast    Narrative    CT BRAIN PERFUSION 5/26/2017 11:54 AM    COMPARISON: None    HISTORY: Stroke    TECHNIQUE: Time sequential axial CT images of the head were acquired  during the administration of intravenous contrast (50 mL Isovue-370).  CTA images of the [place] as well as color perfusion maps of  the brain were created from this time sequential axial source data.      Impression    IMPRESSION: There is delayed arrival of the contrast bolus to the  right anterior cerebral and right middle cerebral artery territories  consistent with the known right internal carotid artery occlusion.  This results in mildly decreased cerebral blood flow but increased  cerebral blood volume to the right KELIN and right MCA territories  suggesting vasodilatation of the right KELIN and right MCA territories  to accommodate for decreased cerebral blood flow. There are no  findings to suggest acute ischemia or infarct. Perfusion defects  corresponding to chronic right frontal  and right parietal infarcts are  noted. No other perfusion abnormalities.    Radiation dose for this scan was reduced using automated exposure  control, adjustment of the mA and/or kV according to patient size, or  iterative reconstruction technique    MILLI ODOM MD   Blood gas arterial and oxyhgb   Result Value Ref Range    pH Arterial 7.34 (L) 7.35 - 7.45 pH    pCO2 Arterial 39 35 - 45 mm Hg    pO2 Arterial 151 (H) 80 - 105 mm Hg    Bicarbonate Arterial 21 21 - 28 mmol/L    FIO2 40%     Oxyhemoglobin Arterial 97 92 - 100 %    Base Deficit Art 3.9 mmol/L   Basic metabolic panel   Result Value Ref Range    Sodium 137 133 - 144 mmol/L    Potassium 4.2 3.4 - 5.3 mmol/L    Chloride 105 94 - 109 mmol/L    Carbon Dioxide 22 20 - 32 mmol/L    Anion Gap 10 3 - 14 mmol/L    Glucose 156 (H) 70 - 99 mg/dL    Urea Nitrogen 15 7 - 30 mg/dL    Creatinine 1.20 0.66 - 1.25 mg/dL    GFR Estimate 61 >60 mL/min/1.7m2    GFR Estimate If Black 74 >60 mL/min/1.7m2    Calcium 7.7 (L) 8.5 - 10.1 mg/dL   CBC with platelets differential   Result Value Ref Range    WBC 12.2 (H) 4.0 - 11.0 10e9/L    RBC Count 3.90 (L) 4.4 - 5.9 10e12/L    Hemoglobin 11.8 (L) 13.3 - 17.7 g/dL    Hematocrit 35.8 (L) 40.0 - 53.0 %    MCV 92 78 - 100 fl    MCH 30.3 26.5 - 33.0 pg    MCHC 33.0 31.5 - 36.5 g/dL    RDW 14.4 10.0 - 15.0 %    Platelet Count 236 150 - 450 10e9/L    Diff Method Automated Method     % Neutrophils 84.6 %    % Lymphocytes 10.8 %    % Monocytes 3.7 %    % Eosinophils 0.2 %    % Basophils 0.2 %    % Immature Granulocytes 0.5 %    Nucleated RBCs 0 0 /100    Absolute Neutrophil 10.3 (H) 1.6 - 8.3 10e9/L    Absolute Lymphocytes 1.3 0.8 - 5.3 10e9/L    Absolute Monocytes 0.5 0.0 - 1.3 10e9/L    Absolute Eosinophils 0.0 0.0 - 0.7 10e9/L    Absolute Basophils 0.0 0.0 - 0.2 10e9/L    Abs Immature Granulocytes 0.1 0 - 0.4 10e9/L    Absolute Nucleated RBC 0.0    INR   Result Value Ref Range    INR 1.10 0.86 - 1.14   Lactic acid whole blood    Result Value Ref Range    Lactic Acid 3.6 (H) 0.7 - 2.1 mmol/L   Partial thromboplastin time   Result Value Ref Range    PTT 28 22 - 37 sec   Blood culture   Result Value Ref Range    Specimen Description Blood Right Arm     Special Requests Aerobic and anaerobic bottles received     Culture Micro No growth after 18 hours     Micro Report Status Pending    Magnesium   Result Value Ref Range    Magnesium 1.9 1.6 - 2.3 mg/dL   Phosphorus   Result Value Ref Range    Phosphorus 3.4 2.5 - 4.5 mg/dL   MR Brain w/o Contrast    Narrative    MRI OF THE BRAIN WITHOUT CONTRAST 5/26/2017 12:51 PM     COMPARISON: Head CT same day.    HISTORY:  Evaluation right internal carotid artery occlusion.    TECHNIQUE:   Brain: Axial diffusion-weighted with ADC map, T2-weighted with fat  saturation, T1-weighted and turboFLAIR and coronal T1-weighted images  of the brain were obtained without intravenous contrast.     FINDINGS: There are recent, though not definitely acute, border zone  infarcts in the parasagittal aspect of the high right cerebral  hemisphere involving portions of the right frontal and right parietal  lobes. These are consistent with the patient's known right internal  carotid artery occlusion.    Chronic infarcts in the right frontal and right parietal lobes are  again noted.    There is moderate diffuse cerebral volume loss. There are numerous  scattered focal and patchy periventricular areas of abnormal T2 signal  hyperintensity in the cerebral white matter bilaterally that are  consistent with sequela of chronic small vessel ischemic disease. The  ventricles and basal cisterns are within normal limits in  configuration given the degree of cerebral volume loss.  There is no  midline shift.  There are no extra-axial fluid collections.  There is  no evidence for acute intracranial hemorrhage.    There is no sinusitis or mastoiditis.      Impression    IMPRESSION:    1. Probable recent although not definitely acute  "border zone infarcts  in the high right cerebral hemisphere.  2. No definite acute infarcts noted.  3. Diffuse cerebral volume loss and cerebral white matter changes  consistent with chronic small vessel ischemic disease.    MILLI ODOM MD   Nutrition Services Adult IP Consult    Narrative    Xiao Fermin RD, LD     5/26/2017  2:58 PM  CLINICAL NUTRITION SERVICES  -  ASSESSMENT NOTE    Recommendations Ordered by Registered Dietitian (RD):   Recommend TF: Peptamen Intense VHP @ 40 mL/hr x 24 hours (960 mL)   to provide 960 kcal, 89 g pro (1.6 g/kg), 75 g CHO, 3.8 g Fiber,   806 mL free H2O.   - 60 mL fluid flush q 4 hrs for tube patency     Total (TF + D5 + Prop) = 1534 kcal (28 kcal/kg), 89 g pro (1.6   g/kg)    Begin TF @ 15 ml/hr. After 12 hours with good tolerance and Phos   >1.9, begin advancement 15 ml q 12 hours until goal is met.    Malnutrition: Based on physical assessment pt likely with severe   malnutrition  In Context of:  Acute illness or injury  Chronic illness or disease     REASON FOR ASSESSMENT  Jair Fernández is a 66 year old male seen by Registered   Dietitian for Provider Order - Registered Dietitian to Assess and   Recommend TF.  Provider responsible for entering TF orders.   Verbal order received for RD to enter TF orders, tube not yet   placed, likely gastric.     NUTRITION HISTORY  - No medical history available. Pt intubated, sedated unable to   obtain nutrition history.     CURRENT NUTRITION ORDERS  Diet Order:     NPO     Current Intake/Tolerance:  NA    PHYSICAL FINDINGS  Observed  Pt covered up to shoulders with blanket - observed face,   shoulders, chest areas. Bones pronounced in shoulder/chest  Muscle Wasting - facial, shoulders, clavicle, severe wasting  Subcutaneous fat loss - facial, chest area, severe wasting  Obtained from Chart/Interdisciplinary Team  Appears cachectic   FT to be placed - likely gastric    ANTHROPOMETRICS  Height: 5' 5\"  Weight: 119 lbs 6.4 oz (54.2 " kg)  Body mass index is 19.87 kg/(m^2).  Weight Status:  Normal BMI  IBW: 56.8 kg  % IBW: 95%  Weight History:   Wt Readings from Last 10 Encounters:   05/26/17 54.2 kg (119 lb 6.4 oz)       LABS  Labs reviewed  K 4.2 (NL)  BGs 156, 196 - trending high    MEDICATIONS  Medications reviewed  D5 @ 100 ml/hr -> 120 g CHO, 408 kcal daily from dextrose   Propofol @ 3.3 ml/hr --> 166 kcal daily from lipid  Electrolyte replacement protocols ordered    Dosing Weight 54.2 kg - admit    ASSESSED NUTRITION NEEDS PER APPROVED PRACTICE GUIDELINES:  Estimated Energy Needs: 2747-2382 kcals (25-30 Kcal/Kg)  Justification: repletion  Estimated Protein Needs:  grams protein (1.5-2 g pro/Kg)  Justification: Repletion  Estimated Fluid Needs: 0462-2431 mL (1 mL/Kcal)  Justification: maintenance    MALNUTRITION:  % Weight Loss:  No weight history on file  % Intake:  Unable to evaluate  Subcutaneous Fat Loss:  Orbital region severe depletion and Upper   arm region severe depletion  Muscle Loss:  Temporal region severe depletion, Clavicle bone   region severe depletion and Acromion bone region severe depletion  Fluid Retention:  None noted    Malnutrition Diagnosis: Severe malnutrition  In Context of:  Acute illness or injury  Chronic illness or disease    NUTRITION DIAGNOSIS:  Inadequate protein-energy intake related to inability for PO 2/2   intubation as evidenced by plan for TF as pt currently meeting   ~40% est energy needs and 0% protein needs via D5 and Propofol   administration.      NUTRITION INTERVENTIONS  Recommendations / Nutrition Prescription  Recommend TF: Peptamen Intense VHP @ 40 mL/hr x 24 hours (960 mL)   to provide 960 kcal, 89 g pro (1.6 g/kg), 75 g CHO, 3.8 g Fiber,   806 mL free H2O.   - 60 mL fluid flush q 4 hrs for tube patency     Total (TF + D5 + Prop) = 1534 kcal (28 kcal/kg), 89 g pro (1.6   g/kg)    Begin TF @ 15 ml/hr. After 12 hours with good tolerance and Phos   >1.9, begin advancement 15 ml q 12 hours  until goal is met.     Implementation  Nutrition education: Not appropriate at this time due to patient   condition  EN Composition, EN Schedule and Feeding Tube Flush: ordered in   EPIC per Verbal order as above.     Nutrition Goals  Peptamen Intense VHP + Prop + D5 to meet % estimated needs   in the next 48-60 hours.      MONITORING AND EVALUATION:  Progress towards goals will be monitored and evaluated per   protocol and Practice Guidelines    Xiao Fermin, RD, LD             Glucose by meter   Result Value Ref Range    Glucose 196 (H) 70 - 99 mg/dL   UA with Microscopic reflex to Culture   Result Value Ref Range    Color Urine Light Yellow     Appearance Urine Clear     Glucose Urine 70 (A) NEG mg/dL    Bilirubin Urine Negative NEG    Ketones Urine Negative NEG mg/dL    Specific Gravity Urine 1.015 1.003 - 1.035    Blood Urine Negative NEG    pH Urine 7.0 5.0 - 7.0 pH    Protein Albumin Urine Negative NEG mg/dL    Urobilinogen mg/dL Normal 0.0 - 2.0 mg/dL    Nitrite Urine Negative NEG    Leukocyte Esterase Urine Negative NEG    Source Catheterized Urine     WBC Urine <1 0 - 2 /HPF    RBC Urine 0 0 - 2 /HPF    Mucous Urine Present (A) NEG /LPF    Hyaline Casts 1 0 - 2 /LPF   Drug abuse screen 8 urine (UR)   Result Value Ref Range    Amphetamine Qual Urine (A) NEG     Canceled, Test credited   Incorrectly ordered by PCU/Kenzie SWEET IN ICUS TO ORDER CORRECT TEST. 1530 DD      Barbiturates Qual Urine (A) NEG     Canceled, Test credited   Incorrectly ordered by PCU/Kenzie SWEET IN ICUS TO ORDER CORRECT TEST. 1530 DD      Benzodiazepine Qual Urine (A) NEG     Canceled, Test credited   Incorrectly ordered by PCU/Kenzie SWEET IN ICUS TO ORDER CORRECT TEST. 1530 DD      Cannabinoids Qual Urine (A) NEG     Canceled, Test credited   Incorrectly ordered by PCU/Kenzie SWEET IN ICUS TO ORDER CORRECT TEST. 1530 DD      Cocaine Qual Urine (A) NEG     Canceled, Test credited   Incorrectly ordered by  PCU/Clinic  [facility] IN ICUS TO ORDER CORRECT TEST. 1530 DD      Ethanol Qual Urine (A) NEG     Canceled, Test credited   Incorrectly ordered by PCU/Kenzie SWEET IN ICUS TO ORDER CORRECT TEST. 1530 DD      Opiates Qualitative Urine (A) NEG     Canceled, Test credited   Incorrectly ordered by PCU/Clinic  [facility] IN ICUS TO ORDER CORRECT TEST. 1530 DD      PCP Qual Urine (A) NEG     Canceled, Test credited   Incorrectly ordered by PCU/Clinic  [facility] IN ICUS TO ORDER CORRECT TEST. 1530 DD     Drug abuse screen 77 urine   Result Value Ref Range    Amphetamine Qual Urine  NEG     Negative   Cutoff for a negative amphetamine is 500 ng/mL or less.      Barbiturates Qual Urine  NEG     Negative   Cutoff for a negative barbiturate is 200 ng/mL or less.      Benzodiazepine Qual Urine (A) NEG     Positive   Cutoff for a positive benzodiazepine is greater than 200 ng/mL. This is an   unconfirmed screening result to be used for medical purposes only.      Cannabinoids Qual Urine  NEG     Negative   Cutoff for a negative cannabinoid is 50 ng/mL or less.      Cocaine Qual Urine  NEG     Negative   Cutoff for a negative cocaine is 300 ng/mL or less.      Opiates Qualitative Urine  NEG     Negative   Cutoff for a negative opiate is 300 ng/mL or less.      PCP Qual Urine  NEG     Negative   Cutoff for a negative PCP is 25 ng/mL or less.     EKG 12-lead, tracing only   Result Value Ref Range    Interpretation ECG Click View Image link to view waveform and result    XR Chest Port 1 View    Narrative    CHEST ONE VIEW PORTABLE   5/26/2017 4:35 PM     HISTORY: Endotracheal tube placement.    COMPARISON: None.      Impression    IMPRESSION: Endotracheal tube is in place, with tip at the level of  the aramis directed toward the right mainstem bronchus. This  endotracheal tube should be pulled back approximately 3 cm for more  optimal positioning. Enteric tube is in place, tip not seen, but  sidehole appears to be above the  gastroesophageal junction. Right PICC  line is in place, with tip near the SVC/RA junction. The lungs appear  clear where visualized. The lung bases are not entirely included on  this exam. No evidence for pneumothorax.     SHARON ZEPEDA MD   Blood culture   Result Value Ref Range    Specimen Description Blood PICC Right     Special Requests Aerobic and anaerobic bottles received     Culture Micro No growth after 11 hours     Micro Report Status Pending    Hepatic panel   Result Value Ref Range    Bilirubin Direct 0.1 0.0 - 0.2 mg/dL    Bilirubin Total 0.4 0.2 - 1.3 mg/dL    Albumin 2.8 (L) 3.4 - 5.0 g/dL    Protein Total 6.4 (L) 6.8 - 8.8 g/dL    Alkaline Phosphatase 54 40 - 150 U/L    ALT 18 0 - 70 U/L    AST 22 0 - 45 U/L   Lactic acid whole blood   Result Value Ref Range    Lactic Acid 4.1 (HH) 0.7 - 2.1 mmol/L   Magnesium   Result Value Ref Range    Magnesium 1.9 1.6 - 2.3 mg/dL   Phosphorus   Result Value Ref Range    Phosphorus 3.3 2.5 - 4.5 mg/dL   Procalcitonin level   Result Value Ref Range    Procalcitonin  ng/ml     <0.05  <0.05 ng/ml  Normal  Recommendation: Very low risk of bacterial infection.   Discourage antibiotics unless strong clinical suspicion for serious infection.     Troponin I   Result Value Ref Range    Troponin I ES  0.000 - 0.045 ug/L     <0.015  The 99th percentile for upper reference range is 0.045 ug/L.  Troponin values in   the range of 0.045 - 0.120 ug/L may be associated with risks of adverse   clinical events.     Methicillin Resistant Staph Aureus PCR   Result Value Ref Range    Specimen Description Nares     S Aur Meth Resis PCR  NEG     Negative  MRSA Negative: SA Positive  MRSA target DNA not detected, presumed negative for MRSA colonization or the   number of bacteria present may be below the limit of detection.  Staphylococcus aureus target DNA detected, presumed positive for SA   colonization.  A positive test does not necessarily indicate the presence of viable  organisms.   It is, however, presumptive for the presence of SA.  This result does not   preclude MRSA nasal colonization.  FDA approved assay performed using [employer] GeneXpert(R) real-time PCR.     Lactic acid whole blood   Result Value Ref Range    Lactic Acid 2.7 (H) 0.7 - 2.1 mmol/L   Blood gas arterial and oxyhgb   Result Value Ref Range    pH Arterial 7.44 7.35 - 7.45 pH    pCO2 Arterial 32 (L) 35 - 45 mm Hg    pO2 Arterial 111 (H) 80 - 105 mm Hg    Bicarbonate Arterial 22 21 - 28 mmol/L    Oxyhemoglobin Arterial 98 92 - 100 %    Base Deficit Art 1.9 mmol/L   Glucose by meter   Result Value Ref Range    Glucose 176 (H) 70 - 99 mg/dL   Comprehensive metabolic panel   Result Value Ref Range    Sodium 135 133 - 144 mmol/L    Potassium 3.9 3.4 - 5.3 mmol/L    Chloride 103 94 - 109 mmol/L    Carbon Dioxide 25 20 - 32 mmol/L    Anion Gap 7 3 - 14 mmol/L    Glucose 163 (H) 70 - 99 mg/dL    Urea Nitrogen 12 7 - 30 mg/dL    Creatinine 0.83 0.66 - 1.25 mg/dL    GFR Estimate >90  Non  GFR Calc   >60 mL/min/1.7m2    GFR Estimate If Black >90   GFR Calc   >60 mL/min/1.7m2    Calcium 8.3 (L) 8.5 - 10.1 mg/dL    Bilirubin Total 0.5 0.2 - 1.3 mg/dL    Albumin 3.2 (L) 3.4 - 5.0 g/dL    Protein Total 6.7 (L) 6.8 - 8.8 g/dL    Alkaline Phosphatase 52 40 - 150 U/L    ALT 17 0 - 70 U/L    AST 25 0 - 45 U/L   CBC with platelets   Result Value Ref Range    WBC 20.7 (H) 4.0 - 11.0 10e9/L    RBC Count 3.93 (L) 4.4 - 5.9 10e12/L    Hemoglobin 11.9 (L) 13.3 - 17.7 g/dL    Hematocrit 35.7 (L) 40.0 - 53.0 %    MCV 91 78 - 100 fl    MCH 30.3 26.5 - 33.0 pg    MCHC 33.3 31.5 - 36.5 g/dL    RDW 14.5 10.0 - 15.0 %    Platelet Count 237 150 - 450 10e9/L   Magnesium   Result Value Ref Range    Magnesium 1.8 1.6 - 2.3 mg/dL   Phosphorus   Result Value Ref Range    Phosphorus 2.9 2.5 - 4.5 mg/dL   Lactic acid whole blood (AM Draw)   Result Value Ref Range    Lactic Acid 3.4 (H) 0.7 - 2.1 mmol/L         Images and  Procedures:  I personally reviewed the images of the following studies:  MRI brain: recent but not acute, small border-zone infarcts R hemisphere.  Previous R jacey strokes  CTA: R ICA occlusion- likely chroninc

## 2017-05-28 PROBLEM — I63.9 CEREBROVASCULAR ACCIDENT (H): Status: ACTIVE | Noted: 2017-01-01

## 2017-05-28 NOTE — PROGRESS NOTES
[facility] ICU RESPIRATORY NOTE  Date of Admission: 5/26/17  Date of Intubation (most recent): 5/26/17  Reason for Mechanical Ventilation: Airway protection (CVA)  Number of Days on Mechanical Ventilation: 3  Met Criteria for Pressure Support Trial: Yes  Length of Pressure Support Trial: PS 7/5 for about ~2 hrs.    Ventilation Mode: CMV/AC  FiO2 (%): 40 %  Rate Set (breaths/minute): 16 breaths/min  Tidal Volume Set (mL): 450 mL  PEEP (cm H2O): 5 cmH2O  Pressure Support (cm H2O): 7 cmH2O  Oxygen Concentration (%): 40 %  Resp: 20      ABG Results:     Recent Labs  Lab 05/28/17  0612 05/27/17  0529 05/26/17  1155   PH 7.40 7.44 7.34*   PCO2 38 32* 39   PO2 109* 111* 151*   HCO3 23 22 21   O2PER  --   --  40%       Plan:  Cont full vent support overnight.    5/28/2017  Indira Dhillon, RT

## 2017-05-28 NOTE — PROGRESS NOTES
[facility]    Daily Progress Note  Critical Care Service     Date of Admission:  5/26/2017  Date of Service (when I saw the patient): May 28, 2017      Assessment & Plan   Jair Fernández is a 66 year old male who presents with AMS.  EMS  Found pt unresponsive outside on the ground of a local [employer]. Brought to the ED  The patient was also hypotensive with systolic pressures in the 70s.  Intubated there.  Per pt's sister he has a history of neck cancer and surgery, heavy EtOH use, smoker. Weaned off pressors, now hypertensive, EEG showed no seizure, MRI did not show obvious acute stroke.    5/28: More alert this AM, following commands but not really moving left side        Neuro  1. AMS, question of etiology, no seizures on EEG  2. Left sided weakness   3. Hx multiple strokes and chronic right carotid occlusion per MRI  Plan:  -- Neuro cares managed by Neurology ? Repeat MRI      CV  1. Hypotension resolved, lactate now normal  Plan:  -- Most likely hypovolemic.  Keep MAP > 65, fluids as needed to achieve this  -- Will d/c maint IV now that TF at goal.    Resp:  1. Acute respiratory failure, per report, pt intubated 2nd to emesis and AMS in ED.  On minimal vent support but concern for ability to clear secretions at this point.  Plan:  -- AC ventilation 16* 450 +5  -- PST later today, and daily   -- Empiric abx for aspiration    GI/Nutrition  1. Malnutrition, raul/pro deficit  Plan:  -- PPI  -- On TF    Renal  No known hx.  Creatinine 1.2 on admit, now 0.8  Plan:      Endocrine  1. Hyperglycemia  Plan:  --  Insulin gtt per protocol if indicated  --  Keep BG  <180 for optimal healing    Heme:  1. Leukocytosis  Plan:  -- Monitor for other s/s infection    ID  1. ? Sepsis with elevated WBC, lactate, no clear source, procal neg  -Empiric zosyn for possible aspiration pneumonia.      General cares:  DVT Prophylaxis: Pneumatic Compression Devices, heparin sq  GI Prophylaxis: PPI  Restraints:  Restraints for medical healing needed: YES  Family update by me today: No.  SW informed of lack of family/hx, will look into it.  Current lines are required for patient management  Access:  PIV, will ask for PICC emergently as unable to get more access.      David Morris Perlman    Time Spent on this Encounter   Billing:  I spent 45 minutes bedside and on the inpatient unit today managing the critical care of Jair Fernández in relation to the issues listed in this note.    Code Status   DNR per discussion with patients sister    Primary Care Physician   None Doctor    Chief Complaint   AMS    Interval History last 24h  No events overnight, no seizure on EEG, sedated on propofol.  DNR per discussion with patient's daughter    Review of Systems   Review of systems not obtained due to patient factors - intubation and sedation    Physical Exam   Temp: 97.5  F (36.4  C)   Temp  Min: 97.2  F (36.2  C)  Max: 100.2  F (37.9  C) BP: 129/73 Pulse: (!) 0 Heart Rate: 85 Resp: 16 SpO2: 100 % O2 Device: Mechanical Ventilator    Vital Signs with Ranges  Temp:  [97.2  F (36.2  C)-100.2  F (37.9  C)] 97.5  F (36.4  C)  Pulse:  [0] 0  Heart Rate:  [] 85  Resp:  [16-28] 16  BP: ()/(53-82) 129/73  FiO2 (%):  [40 %] 40 %  SpO2:  [97 %-100 %] 100 %  119 lbs 6.4 oz    Constitutional:  Intubated, appears chronically ill  Eyes:  pupils equal, round and reactive to light, sclera clear, conjunctiva normal.  Respiratory:  clear to auscultation bilaterally, no crackles or wheezing.  Cardiovascular: Normal apical impulse, regular rate and rhythm, normal S1 and S2, and no murmur noted.  GI: normal bowel sounds, soft, non-distended, non-tender, no guarding  Genitourinary:  Normal external anatomy, orona, urine yellow and clear  Skin: very dry, nails unkept,   Musculoskeletal: MC but weaker on left, still with decreased LOC,   Neurologic: eyes not open to pain, right side moves very purposefully, follows commands  Neuropsychiatric:  ANN    Data   Results for orders placed or performed during the hospital encounter of 05/26/17 (from the past 24 hour(s))   XR Chest Port 1 View    Narrative    XR CHEST PORT 1 VW 5/27/2017 11:45 AM    COMPARISON: 5/26/2017    HISTORY: Intubation      Impression    IMPRESSION: Endotracheal tube tip approximately 4.5 cm above the  aramis. Right upper extremity PICC tip in the mid SVC. Enteric tube  courses below the diaphragm, tip not included in this image. The  sidehole is likely in the distal esophagus, recommend advancing  approximately 4 cm. No pneumothorax on either side. Possible small  left pleural effusion.    BOO MÉNDEZ   Lactic acid whole blood   Result Value Ref Range    Lactic Acid 2.1 0.7 - 2.1 mmol/L   Blood gas venous and oxyhgb   Result Value Ref Range    Ph Venous 7.41 7.32 - 7.43 pH    PCO2 Venous 33 (L) 40 - 50 mm Hg    PO2 Venous 45 25 - 47 mm Hg    Bicarbonate Venous 21 21 - 28 mmol/L    Oxyhemoglobin Venous 81 %    Base Deficit Venous 3.3 mmol/L   Glucose by meter   Result Value Ref Range    Glucose 156 (H) 70 - 99 mg/dL   XR Abdomen Port 1 View    Narrative    XR ABDOMEN PORT F1 VIEW  5/28/2017 12:28 AM      HISTORY: Evaluate feeding tube position.     COMPARISON: None.      Impression    IMPRESSION: Supine portable abdomen. An NG tube is in place with tip  in the body of the stomach. There is a metallic wire projecting over  the distal esophagus. No feeding tube is evident. There are dilated  bowel loops in the midabdomen.   Magnesium   Result Value Ref Range    Magnesium 2.3 1.6 - 2.3 mg/dL   Phosphorus   Result Value Ref Range    Phosphorus 2.6 2.5 - 4.5 mg/dL   Phenytoin level   Result Value Ref Range    Phenytoin Level 12.3 10 - 20 mg/L   Comprehensive metabolic panel   Result Value Ref Range    Sodium 135 133 - 144 mmol/L    Potassium 5.0 3.4 - 5.3 mmol/L    Chloride 104 94 - 109 mmol/L    Carbon Dioxide 23 20 - 32 mmol/L    Anion Gap 8 3 - 14 mmol/L    Glucose 125 (H) 70 - 99 mg/dL     Urea Nitrogen 13 7 - 30 mg/dL    Creatinine 0.87 0.66 - 1.25 mg/dL    GFR Estimate 89 >60 mL/min/1.7m2    GFR Estimate If Black >90   GFR Calc   >60 mL/min/1.7m2    Calcium 8.0 (L) 8.5 - 10.1 mg/dL    Bilirubin Total 0.6 0.2 - 1.3 mg/dL    Albumin 2.5 (L) 3.4 - 5.0 g/dL    Protein Total 6.3 (L) 6.8 - 8.8 g/dL    Alkaline Phosphatase 57 40 - 150 U/L    ALT 18 0 - 70 U/L    AST 31 0 - 45 U/L   Lactic acid whole blood   Result Value Ref Range    Lactic Acid 1.2 0.7 - 2.1 mmol/L   CBC with platelets differential   Result Value Ref Range    WBC 18.0 (H) 4.0 - 11.0 10e9/L    RBC Count 3.81 (L) 4.4 - 5.9 10e12/L    Hemoglobin 11.6 (L) 13.3 - 17.7 g/dL    Hematocrit 34.7 (L) 40.0 - 53.0 %    MCV 91 78 - 100 fl    MCH 30.4 26.5 - 33.0 pg    MCHC 33.4 31.5 - 36.5 g/dL    RDW 15.0 10.0 - 15.0 %    Platelet Count 169 150 - 450 10e9/L    Diff Method Automated Method     % Neutrophils 90.9 %    % Lymphocytes 5.4 %    % Monocytes 2.6 %    % Eosinophils 0.4 %    % Basophils 0.2 %    % Immature Granulocytes 0.5 %    Nucleated RBCs 0 0 /100    Absolute Neutrophil 16.3 (H) 1.6 - 8.3 10e9/L    Absolute Lymphocytes 1.0 0.8 - 5.3 10e9/L    Absolute Monocytes 0.5 0.0 - 1.3 10e9/L    Absolute Eosinophils 0.1 0.0 - 0.7 10e9/L    Absolute Basophils 0.0 0.0 - 0.2 10e9/L    Abs Immature Granulocytes 0.1 0 - 0.4 10e9/L    Absolute Nucleated RBC 0.0    Ammonia   Result Value Ref Range    Ammonia 24 10 - 50 umol/L   XR Chest Port 1 View    Narrative    XR CHEST PORT 1 VW  5/28/2017 4:35 AM     HISTORY:  Intubated    COMPARISON: Film performed on 5/27/2017    FINDINGS:  ET tube is in place. Temperature probe is again noted. NG  tube is seen. Right PICC line is in place. Left basilar opacity  compatible with pleural fluid and associated infiltrate/atelectasis is  unchanged.      Impression    IMPRESSION: Stable, no significant change in the left pleural effusion  and associated infiltrate or atelectasis.    FABRICE RM MD   Blood gas  arterial   Result Value Ref Range    pH Arterial 7.40 7.35 - 7.45 pH    pCO2 Arterial 38 35 - 45 mm Hg    pO2 Arterial 109 (H) 80 - 105 mm Hg    Bicarbonate Arterial 23 21 - 28 mmol/L    Base Deficit Art 1.2 mmol/L

## 2017-05-28 NOTE — PROGRESS NOTES
VSS, no neuro changes. PT had an MRI today due to less/no movement of left arm and leg. The MRI showed a evlolving infarct in the right middle cerebral artery distribution. This are of infarction is significantly larger than on the previous MRI. Tolerating tube feeding.

## 2017-05-28 NOTE — PROCEDURES
ONE-HOUR ELECTROENCEPHALOGRAM WITH VIDEO      ORDERING PHYSICIAN:  Narciso Huntley MD      EEG # , One hour with video       EEG on Jair Fernández demonstrated significant suppression of the left side of the hemisphere with 5-6 Hz activity on the left side of the brain.  There are no clear epileptiform discharges seen on this EEG and no electrographic seizures seen on this EEG.      IMPRESSION:  Encephalopathic EEG with significant asymmetry between hemispheres.  Clinical correlation required.         NARCISO HUNTLEY MD, PHD, [facility]             D: 2017 13:50   T: 2017 09:17   MT: sandrita      Name:     JAIR FERNÁNDEZ   MRN:      [id number]        Account:        [id number]   :      1950           Procedure Date: 2017      Document: [id number]

## 2017-05-28 NOTE — PROGRESS NOTES
[facility] hospital ICU RESPIRATORY NOTE  Date of Admission: 5/26/17  Date of Intubation (most recent): 5/26/17  Reason for Mechanical Ventilation: Airway protection (CVA)  Number of Days on Mechanical Ventilation: 3  Met Criteria for Pressure Support Trial: No  Length of Pressure Support Trial:  Reason for Stopping Pressure Support Trial:  Reason for No Pressure Support Trial: per MD    Significant Events Today: Pt remains on the following vent settings:    Ventilation Mode: CMV/AC  FiO2 (%): 40 %  Rate Set (breaths/minute): 16 breaths/min  Tidal Volume Set (mL): 450 mL  PEEP (cm H2O): 5 cmH2O  Oxygen Concentration (%): 40 %  Resp: 21      ABG Results:      Recent Labs  Lab 05/27/17  0529 05/26/17  1155   PH 7.44 7.34*   PCO2 32* 39   PO2 111* 151*   HCO3 22 21   O2PER  --  40%           ETT appearance on chest x-ray: 4.5cm above aramis    Plan: Cont full ventilatory support.    5/28/2017  Avril George RT

## 2017-05-28 NOTE — PROGRESS NOTES
Patient's morning BMP with K of 5. Changed IV fluid from D5-0.5NS+20 meq KCL @100 cc/hour to D5-0.5NS @ 100cc/hour.       Miguel Edwards

## 2017-05-28 NOTE — PROGRESS NOTES
[facility]    Neurology Progress Note    Date of Service (when I saw the patient): 05/28/2017     Today's developments: Waking, following commands, L arm plegic, L leg severe weakness- some of this is baseline, unclear if weakness is worse.  Will repeat MRI brain today.    Assessment & Plan   Jair Fernández is a 66 year old male who was admitted on 5/26/2017 after being [place] unresponsive [place].  He was hypotensive as well.  No medical history was available- later found that he has history of neck cancer s/p surgery, etoh use.  Not known if he takes any regular medications.  No known seizure disorder.     (G40.909) Recurrent seizures (H)  --received 4 mg of Ativan  --had loading dose of fosphenytoin  --MRI brain showed recent but not acute small watershed infarcts on right  --EEG showed right attenuation, some left frontotemporal sharply contoured activity, but otherwise was unremarkable with no seizures  --Scheduled fosphenytoin for now.  --pht level 12.3 on 5-28- continue scheduled pht at this time- 100 mg BID.  Repeat pht level in 2 days.   (I63.9) Cerebrovascular accident (CVA), unspecified mechanism (H)  (primary encounter diagnosis)  --left side quite weak- report per family that left arm is chronically weak.  Will repeat MRI brain today.  --Previous multiple strokes  --Right ICA occlusion is likely chronic, given chronic right sided strokes  (J96.01) Acute respiratory failure with hypoxia (H)  --Intubated for airway protection  --Management per medical intensivists.   --ok to wean off sedation from neurological standpoint.  #. DVT Prophylaxis  --Mechanical only  #. PT/OT/Speech  --Start  evaluations  #. Nutrition / GI Prophylaxis  --Per recommendations of speech therapy     Neurocritical care time: 35 minutes Patient is critically ill and has a high risk of neurological deterioration.      Chelsea James MD          Interval History   Waking and  "following commands as sedation was stopped.  Very weak on left side.  Medically doing well.    Physical Exam   Temp: 98.8  F (37.1  C)   BP: 122/68 Pulse: (!) 0 Heart Rate: 105 Resp: 16 SpO2: 100 % O2 Device: Mechanical Ventilator    Vitals:    05/26/17 1129   Weight: 54.2 kg (119 lb 6.4 oz)     Vital Signs with Ranges  Temp:  [97.2  F (36.2  C)-100  F (37.8  C)] 98.8  F (37.1  C)  Pulse:  [0] 0  Heart Rate:  [] 105  Resp:  [15-28] 16  BP: ()/(53-82) 122/68  FiO2 (%):  [40 %] 40 %  SpO2:  [97 %-100 %] 100 %  I/O last 3 completed shifts:  In: 3680.95 [I.V.:2765.95; NG/GT:360]  Out: 1435 [Urine:1435]      General Appearance:  No acute distress  Neuro:       Mental Status Exam:   A,A, follows most commands, able to nod yes and no but may not be answering questions correctly       Cranial Nerves: did not follow my fingers with his eyes.  Ptosis bilaterally.  Face grossly equal and symmetric        Motor:   Moves R side normally to command, no movement L arm, only withdraws L leg       Sensory:  Nods \"yes\" that he can feel normally all 4 extremities             CV: RRR nl s1, s2  Resp: some rales throughout improved.    Extremities: No clubbing, no cyanosis, no edema    Medications     dextrose 5% and 0.45% NaCl 100 mL/hr at 05/28/17 0800     - MEDICATION INSTRUCTIONS -       IV fluid REPLACEMENT ONLY       propofol (DIPRIVAN) infusion Stopped (05/28/17 0752)       [START ON 5/29/2017] pantoprazole  40 mg Intravenous QAM AC     fosphenytoin (CEREBYX) intermittent infusion (maintenance)  100 mg PE Intravenous Q12H     piperacillin-tazobactam  3.375 g Intravenous Q6H     chlorhexidine  15 mL Mouth/Throat Q12H     sodium chloride (PF)  10 mL Intracatheter Q8H     heparin  5,000 Units Subcutaneous Q8H     thiamine  250 mg Intravenous Daily    Followed by     [START ON 5/31/2017] thiamine  200 mg Oral or Feeding Tube Daily     albuterol  2.5 mg Nebulization Q6H       Data   Results for orders placed or performed " during the hospital encounter of 05/26/17 (from the past 24 hour(s))   Lactic acid whole blood   Result Value Ref Range    Lactic Acid 2.1 0.7 - 2.1 mmol/L   Blood gas venous and oxyhgb   Result Value Ref Range    Ph Venous 7.41 7.32 - 7.43 pH    PCO2 Venous 33 (L) 40 - 50 mm Hg    PO2 Venous 45 25 - 47 mm Hg    Bicarbonate Venous 21 21 - 28 mmol/L    Oxyhemoglobin Venous 81 %    Base Deficit Venous 3.3 mmol/L   Glucose by meter   Result Value Ref Range    Glucose 156 (H) 70 - 99 mg/dL   XR Abdomen Port 1 View    Narrative    XR ABDOMEN PORT F1 VIEW  5/28/2017 12:28 AM      HISTORY: Evaluate feeding tube position.     COMPARISON: None.      Impression    IMPRESSION: Supine portable abdomen. An NG tube is in place with tip  in the body of the stomach. There is a metallic wire projecting over  the distal esophagus. No feeding tube is evident. There are dilated  bowel loops in the midabdomen.   Magnesium   Result Value Ref Range    Magnesium 2.3 1.6 - 2.3 mg/dL   Phosphorus   Result Value Ref Range    Phosphorus 2.6 2.5 - 4.5 mg/dL   Phenytoin level   Result Value Ref Range    Phenytoin Level 12.3 10 - 20 mg/L   Comprehensive metabolic panel   Result Value Ref Range    Sodium 135 133 - 144 mmol/L    Potassium 5.0 3.4 - 5.3 mmol/L    Chloride 104 94 - 109 mmol/L    Carbon Dioxide 23 20 - 32 mmol/L    Anion Gap 8 3 - 14 mmol/L    Glucose 125 (H) 70 - 99 mg/dL    Urea Nitrogen 13 7 - 30 mg/dL    Creatinine 0.87 0.66 - 1.25 mg/dL    GFR Estimate 89 >60 mL/min/1.7m2    GFR Estimate If Black >90   GFR Calc   >60 mL/min/1.7m2    Calcium 8.0 (L) 8.5 - 10.1 mg/dL    Bilirubin Total 0.6 0.2 - 1.3 mg/dL    Albumin 2.5 (L) 3.4 - 5.0 g/dL    Protein Total 6.3 (L) 6.8 - 8.8 g/dL    Alkaline Phosphatase 57 40 - 150 U/L    ALT 18 0 - 70 U/L    AST 31 0 - 45 U/L   Lactic acid whole blood   Result Value Ref Range    Lactic Acid 1.2 0.7 - 2.1 mmol/L   CBC with platelets differential   Result Value Ref Range    WBC 18.0  (H) 4.0 - 11.0 10e9/L    RBC Count 3.81 (L) 4.4 - 5.9 10e12/L    Hemoglobin 11.6 (L) 13.3 - 17.7 g/dL    Hematocrit 34.7 (L) 40.0 - 53.0 %    MCV 91 78 - 100 fl    MCH 30.4 26.5 - 33.0 pg    MCHC 33.4 31.5 - 36.5 g/dL    RDW 15.0 10.0 - 15.0 %    Platelet Count 169 150 - 450 10e9/L    Diff Method Automated Method     % Neutrophils 90.9 %    % Lymphocytes 5.4 %    % Monocytes 2.6 %    % Eosinophils 0.4 %    % Basophils 0.2 %    % Immature Granulocytes 0.5 %    Nucleated RBCs 0 0 /100    Absolute Neutrophil 16.3 (H) 1.6 - 8.3 10e9/L    Absolute Lymphocytes 1.0 0.8 - 5.3 10e9/L    Absolute Monocytes 0.5 0.0 - 1.3 10e9/L    Absolute Eosinophils 0.1 0.0 - 0.7 10e9/L    Absolute Basophils 0.0 0.0 - 0.2 10e9/L    Abs Immature Granulocytes 0.1 0 - 0.4 10e9/L    Absolute Nucleated RBC 0.0    Ammonia   Result Value Ref Range    Ammonia 24 10 - 50 umol/L   XR Chest Port 1 View    Narrative    XR CHEST PORT 1 VW  5/28/2017 4:35 AM     HISTORY:  Intubated    COMPARISON: Film performed on 5/27/2017    FINDINGS:  ET tube is in place. Temperature probe is again noted. NG  tube is seen. Right PICC line is in place. Left basilar opacity  compatible with pleural fluid and associated infiltrate/atelectasis is  unchanged.      Impression    IMPRESSION: Stable, no significant change in the left pleural effusion  and associated infiltrate or atelectasis.    FABRICE RM MD   Blood gas arterial   Result Value Ref Range    pH Arterial 7.40 7.35 - 7.45 pH    pCO2 Arterial 38 35 - 45 mm Hg    pO2 Arterial 109 (H) 80 - 105 mm Hg    Bicarbonate Arterial 23 21 - 28 mmol/L    Base Deficit Art 1.2 mmol/L         Images and Procedures:  I personally reviewed the images of the following studies:

## 2017-05-28 NOTE — PROGRESS NOTES
Repeat MRI showed significant increase in size of R sided infarct with occlusion of R carotid.  Discussed with Neurology and vascular surgery, no indication for acute procedure, will allow to have higher BP to help with perfusion, Follow neuro status.    Keep SBP < 200    David Perlman, MD

## 2017-05-29 NOTE — PROGRESS NOTES
"[facility]  [facility] [place]  Neuro-ICU Progress Note       Admission Date: 5/26/2017  Date of Service:05/29/2017   Hospital Day: 4   _________________________________     Main Plans for Today   Continue supportive care  Assessment & Plan   #(I63.9) Cerebrovascular accident (CVA), unspecified mechanism (H)  (primary encounter diagnosis)  --Significant acute extension of right sided strokes  --Previous multiple strokes  --Right ICA occlusion is likely chronic, given chronic right sided strokes  (G40.909) Recurrent seizures (H)  --received 4 mg of Ativan  --had loading dose of fosphenytoin  --EEG showed right attenuation, some left frontotemporal sharply contoured activity, but otherwise was unremarkable with no seizures  --Scheduled fosphenytoin for now.  --pht level 12.3 on 5-28- continue scheduled pht at this time- 100 mg BID.  Repeat pht level in 2 days.  (M62.81) Left-sided weakness  --See above  #. (J96.00) Acute respiratory failure, unspecified whether with hypoxia or hypercapnia (HCC)  --Management per medical intensivists  --Patient is not-ready for weaning from neurological standpoint  #. Cardiovascular  --No acute issues  #. Infection   --Elevated WBC  --Management per medical intensivists.   #. Endocrine:   --Insulin protocol to keep blood sugars 100-150.  #. Heme/Onc:   --No acute issues.   #. Renal  --Sodium goal 140-155  #. Skin/Musculoskeletal:  -- No issues  #. PT/OT/Speech  --continue evaluations   #.Nutrition  --Per speech therapy evaluation   #. ICU prophylaxis:   --Stress ulcer prophylaxis Protonix  --VAP: per protocol  --DVT prophylaxis: mechanical       CODE STATUS: DNR        Interval History   No changes. Left side is very weak. No improvement    Physical Exam     Vitals: Blood pressure 119/67, pulse (!) 0, temperature 98.2  F (36.8  C), temperature source Esophageal, resp. rate 20, height 1.651 m (5' 5\"), weight 54.2 kg (119 lb 6.4 oz), SpO2 99 %.  Tmax: Temp " (24hrs), Av.7  F (37.1  C), Min:98.2  F (36.8  C), Max:99  F (37.2  C)    Vital Signs with Ranges  Temp:  [98.2  F (36.8  C)-99  F (37.2  C)] 98.2  F (36.8  C)  Heart Rate:  [] 89  Resp:  [14-28] 20  BP: ()/(47-77) 119/67  FiO2 (%):  [40 %] 40 %  SpO2:  [91 %-100 %] 99 %   I&O:  I/O this shift:  In: 340 [I.V.:200; NG/GT:60]  Out: 350 [Urine:350]  I/O last 3 completed shifts:  In: 3653.13 [I.V.:2083.13; NG/GT:440; IV Piggyback:250]  Out: 2145 [Urine:2145] I/O since admission: 4560.38         General Appearance:   No acute distress  Neuro:       Mental Status Exam:  Sedated, intubated, comatose. S/L untestable due to intubation. Follows commands on the right.  Mental status is decreased and affected by sedation       Cranial Nerves:  Pupils 3 mm,  reactive. Occulocephalis reflexes are present.  Corneal reflexes present. Face symmetric. Gag/Cough reflex present.Other CN are untestable           Motor:  Left sided plegia       Reflexes:  Low DTR, toes equivocal       Sensory:  Untestable                    Coordination:   Untestable       Gait:  Untestable     Cardiovascular: Regular rate and rhythm, no m/r/g  Lungs: Ventilation Mode: CMV/AC  FiO2 (%): 40 %  Rate Set (breaths/minute): 16 breaths/min  Tidal Volume Set (mL): 450 mL  PEEP (cm H2O): 5 cmH2O  Pressure Support (cm H2O): 7 cmH2O  Oxygen Concentration (%): 40 %  Resp: 20  Both hemithoraces are symmetrical, auscultation of lungs revealed no bronchovesicular sounds, expirium prolongation, wheezing, rhonci and crackles  Abdomen: Soft, not tender, not distended  Skin/Extremities: No clubbing, cyanosis, no edema     Medications   Infusions medications:    dextrose 5% and 0.45% NaCl 100 mL/hr at 17     - MEDICATION INSTRUCTIONS -       IV fluid REPLACEMENT ONLY       Schedule medications:    pantoprazole  40 mg Intravenous QAM AC     fosphenytoin (CEREBYX) intermittent infusion (maintenance)  100 mg PE Intravenous Q12H      piperacillin-tazobactam  3.375 g Intravenous Q6H     chlorhexidine  15 mL Mouth/Throat Q12H     sodium chloride (PF)  10 mL Intracatheter Q8H     heparin  5,000 Units Subcutaneous Q8H     thiamine  250 mg Intravenous Daily    Followed by     [START ON 5/31/2017] thiamine  200 mg Oral or Feeding Tube Daily     albuterol  2.5 mg Nebulization Q6H     PRN medications:potassium chloride, potassium chloride, potassium chloride, potassium chloride with lidocaine, potassium chloride, magnesium sulfate, magnesium sulfate, potassium phosphate (KPHOS) in D5W IV, potassium phosphate (KPHOS) in D5W IV, potassium phosphate (KPHOS) in D5W IV, potassium phosphate (KPHOS) in D5W IV, potassium phosphate (KPHOS) in D5W IV, ondansetron **OR** ondansetron, senna-docusate, polyethylene glycol, sodium phosphate, naloxone, HYDROmorphone, - MEDICATION INSTRUCTIONS -, lidocaine 4%, heparin lock flush, sodium chloride (PF), IV fluid REPLACEMENT ONLY  Data       Labotary Data:   All data was reviewed by me personally  CBC RESULTS:  Recent Labs   Lab Test  05/29/17   0435  05/28/17   0415  05/27/17   0555  05/26/17   1200   WBC  16.1*  18.0*  20.7*  12.2*   RBC  3.43*  3.81*  3.93*  3.90*   HGB  10.3*  11.6*  11.9*  11.8*   HCT  31.2*  34.7*  35.7*  35.8*   PLT  173  169  237  236     Basic Metabolic Panel:  Recent Labs   Lab Test  05/29/17   0435  05/28/17   0415  05/27/17   0555  05/26/17   1200   NA  140  135  135  137   POTASSIUM  3.2*  5.0  3.9  4.2   CHLORIDE  109  104  103  105   CO2  25  23  25  22   BUN  14  13  12  15   CR  0.83  0.87  0.83  1.20   GLC  108*  125*  163*  156*   DARINEL  8.2*  8.0*  8.3*  7.7*     ABG:  Recent Labs   Lab Test  05/28/17   0612  05/27/17   0529  05/26/17   1155   PH  7.40  7.44  7.34*   PCO2  38  32*  39   PO2  109*  111*  151*   HCO3  23  22  21   O2PER   --    --   40%     Liver panel:  Recent Labs   Lab Test  05/28/17   0415  05/27/17   0555  05/26/17   1730   PROTTOTAL  6.3*  6.7*  6.4*   ALBUMIN  2.5*   3.2*  2.8*   BILITOTAL  0.6  0.5  0.4   ALKPHOS  57  52  54   AST  31  25  22   ALT  18  17  18     Procalcitonin:   Recent Labs   Lab Test  05/26/17   1730   PCAL  <0.05  <0.05 ng/ml  Normal  Recommendation: Very low risk of bacterial infection.   Discourage antibiotics unless strong clinical suspicion for serious infection.       Coagulation  Recent Labs   Lab Test  05/26/17   1200   INR  1.10   PTT  28      Lipid Profile:No lab results found.  Thyroid Panel:No lab results found.   Vitamin B12:   Recent Labs   Lab Test  05/28/17   1700   B12  312      Vitamin D:No lab results found.  A1C: No lab results found.  Troponin I:   Recent Labs   Lab Test  05/26/17   1730   TROPI  <0.015  The 99th percentile for upper reference range is 0.045 ug/L.  Troponin values in   the range of 0.045 - 0.120 ug/L may be associated with risks of adverse   clinical events.       CPK: No lab results found.  Ammonia:   Recent Labs   Lab Test  05/28/17   0415   MOOK  24     Serum Osmolality:No lab results found.  Most Recent 6 Bacteria Isolates From Any Culture (See EPIC Reports for Culture Details):  Recent Labs   Lab Test  05/26/17   1730  05/26/17   1200   CULT  No growth after 3 days  No growth after 3 days     UA Results:  Recent Labs   Lab Test  05/26/17   1515   COLOR  Light Yellow   APPEARANCE  Clear   URINEGLC  70*   URINEBILI  Negative   URINEKETONE  Negative   SG  1.015   UBLD  Negative   URINEPH  7.0   PROTEIN  Negative   NITRITE  Negative   LEUKEST  Negative   RBCU  0   WBCU  <1          Cardiac US:   The visual ejection fraction is estimated at 60-65%.  No regional wall motion abnormalities noted.  The study was technically difficult. Limited views were obtained.     Neurophysiology:   5/26/17  Encephalopathic EEG with significant asymmetry between hemispheres.        Imaging:   All imaging studies were reviewed personally  CT head 5/26/17:   1. Probable recent although not definitely acute border zone infarcts in the high  right cerebral hemisphere.  2. No definite acute infarcts noted.  3. Diffuse cerebral volume loss and cerebral white matter changes consistent with chronic small vessel ischemic disease.  CT perfusion 5/26/17:   There is delayed arrival of the contrast bolus to the right anterior cerebral and right middle cerebral artery territories  consistent with the known right internal carotid artery occlusion. This results in mildly decreased cerebral blood flow but increased  cerebral blood volume to the right KELIN and right MCA territories suggesting vasodilatation of the right KELIN and right MCA territories  to accommodate for decreased cerebral blood flow. There are no findings to suggest acute ischemia or infarct. Perfusion defects  corresponding to chronic right frontal and right parietal infarcts are   noted. No other perfusion abnormalities.  CT angiography neck/head 5/26/17:   1. Age-indeterminate complete occlusion of the right internal carotid artery from the origin to the supraclinoid segment. Given chronic  appearing right cerebral hemisphere infarcts, there is likely long-standing disease in the right internal carotid artery although  the time of occlusion cannot be determined.  2. Minimal calcified atherosclerotic plaque at the origin of the left internal carotid artery that does not result in stenosis.  3. Otherwise, normal neck and head CTA.  MRI brain 5/28/17:   1. Evolving infarct in the right middle cerebral artery distribution.  This area of infarction is significantly larger than on the previous  MR scan of 5/26/2017. It now involves most of the anterior division of  the right middle cerebral. Smaller areas of infarction are seen in the  right temporal lobe and the medial aspect of the right frontal lobe.  2. Right internal carotid artery is occluded.         Time Spent on this Encounter      Time:   Neurocritical care time:  I spent 50 minutes bedside and on the inpatient unit today managing the  neurocritical care of Jair Fernández in relation to the issues listed in this note. Patient is critically ill / has very high risk of deterioration

## 2017-05-29 NOTE — CONSULTS
Vascular Surgery Consultation Note     Patient:  Jair Fernández   Date of birth 1950, Medical record number [id number]  Date of Visit:  05/29/2017  Date of Admission: 5/26/2017  Consult Requester:Lance Brandt MD            Assessment and Recommendations:   ASSESSMENT:  66 year-old man with right ICA is occluded from its origin to the supraclinoid segment and right hemispheric stroke in currently on mechanical ventilation.    RECOMMENDATION:  No acute surgical intervention at this time due to whole segment occlusion of right ICA. The onset of symptom is more than 3 days ago, therefore reperfusion of the right ICA will not improve his function.    ________________________________________________________________    Consult Question: Right internal carotid occlusion  Admission Diagnosis: Recurrent seizures (H) [G40.909]  Left-sided weakness [M62.81]  Acute respiratory failure with hypoxia (H) [J96.01]  Cerebrovascular accident (CVA), unspecified mechanism (H) [I63.9]         History of Present Illness:   Mr. Jair Fernández is a 66 year-old man, who presented with altered mental status and left sided weakness 3 days ago [place] station. The patient was taken to the ED, where he suffered a seizure. He was intubated and transferred to the ICU. His MRI showed an acute infarct in the the right meddle cerebral artery distribution. CT angiogram of the neck showed whole segment occlusion of the right internal carotid artery extending to the supraclinoid segment. The patient is currently intubated in the ICU.           Review of Systems:   Unable to obtain due to intubation         Past Medical History:   No past medical history on file.         Past Surgical History:   No past surgical history on file.         Family History:   No family history on file.         Social History:     Social History   Substance Use Topics     Smoking status: Not on file     Smokeless tobacco: Not on file     Alcohol use Not on  file     History   Sexual Activity     Sexual activity: Not on file            Current Medications (antimicrobials listed in bold):       pantoprazole  40 mg Intravenous QAM AC     fosphenytoin (CEREBYX) intermittent infusion (maintenance)  100 mg PE Intravenous Q12H     piperacillin-tazobactam  3.375 g Intravenous Q6H     chlorhexidine  15 mL Mouth/Throat Q12H     sodium chloride (PF)  10 mL Intracatheter Q8H     heparin  5,000 Units Subcutaneous Q8H     thiamine  250 mg Intravenous Daily    Followed by     [START ON 5/31/2017] thiamine  200 mg Oral or Feeding Tube Daily     albuterol  2.5 mg Nebulization Q6H            Allergies:   No Known Allergies         Physical Exam:   Vitals were reviewed  Patient Vitals for the past 24 hrs:   BP Temp Temp src Heart Rate Resp SpO2   05/29/17 0800 119/67 98.2  F (36.8  C) Esophageal 89 20 99 %   05/29/17 0730 117/63 - - 82 19 91 %   05/29/17 0721 - - - - - 96 %   05/29/17 0700 117/65 - - 74 19 99 %   05/29/17 0600 100/54 - - 80 19 97 %   05/29/17 0500 118/70 - - 89 18 100 %   05/29/17 0400 107/60 98.2  F (36.8  C) Oral 80 20 99 %   05/29/17 0348 - - - - - 98 %   05/29/17 0300 92/54 - - 86 19 99 %   05/29/17 0200 112/56 - - 87 21 97 %   05/29/17 0100 120/63 - - 86 20 98 %   05/29/17 0000 109/59 98.4  F (36.9  C) Oral 85 14 95 %   05/28/17 2318 - - - - - 95 %   05/28/17 2300 112/63 - - 93 21 97 %   05/28/17 2200 126/65 - - 98 21 97 %   05/28/17 2100 108/65 - - 100 28 99 %   05/28/17 2000 109/59 98.8  F (37.1  C) Oral 98 22 96 %   05/28/17 1926 - - - - - 100 %   05/28/17 1900 101/57 - - 93 21 99 %   05/28/17 1830 106/60 - - 98 19 98 %   05/28/17 1800 112/67 - - 96 20 99 %   05/28/17 1730 121/69 - - 90 18 100 %   05/28/17 1700 128/74 - - 93 17 100 %   05/28/17 1630 132/73 - - 89 17 100 %   05/28/17 1600 105/61 - - 87 20 97 %   05/28/17 1535 - - - - - 99 %   05/28/17 1530 - - - 90 19 99 %   05/28/17 1500 - - - 93 16 100 %   05/28/17 1435 115/47 - - 100 - 99 %   05/28/17 1425  124/77 - - 106 - 99 %   05/28/17 1415 129/60 - - 111 18 99 %   05/28/17 1330 106/61 99  F (37.2  C) - 100 21 98 %   05/28/17 1300 138/75 99  F (37.2  C) - 114 23 99 %   05/28/17 1230 139/76 99  F (37.2  C) - 103 17 100 %   05/28/17 1207 - - - - - 100 %   05/28/17 1200 128/72 98.8  F (37.1  C) - 112 16 100 %   05/28/17 1130 122/68 98.8  F (37.1  C) - 105 16 100 %   05/28/17 1100 147/74 99  F (37.2  C) - 107 15 100 %   05/28/17 1030 131/66 98.8  F (37.1  C) - 105 16 100 %   05/28/17 1000 134/75 98.8  F (37.1  C) - 115 27 100 %   05/28/17 0930 144/76 98.6  F (37  C) - 105 17 100 %     Ranges for his vital signs:  Temp:  [98.2  F (36.8  C)-99  F (37.2  C)] 98.2  F (36.8  C)  Heart Rate:  [] 89  Resp:  [14-28] 20  BP: ()/(47-77) 119/67  FiO2 (%):  [40 %] 40 %  SpO2:  [91 %-100 %] 99 %    Physical Examination:  GENERAL:  Intubated and sedated.   NEUROLOGIC:  Spontaneous movement of right upper and lower extremities, but not left side         Laboratory Data:     Hematology Studies    Recent Labs   Lab Test  05/29/17 0435 05/28/17 0415 05/27/17   0555  05/26/17   1200   WBC  16.1*  18.0*  20.7*  12.2*   ANEU   --   16.3*   --   10.3*   AEOS   --   0.1   --   0.0   HGB  10.3*  11.6*  11.9*  11.8*   MCV  91  91  91  92   PLT  173  169  237  236       Metabolic Studies   Recent Labs   Lab Test  05/29/17 0435  05/28/17   0415  05/27/17   0555  05/26/17   1200   NA  140  135  135  137   POTASSIUM  3.2*  5.0  3.9  4.2   CHLORIDE  109  104  103  105   CO2  25  23  25  22   BUN  14  13  12  15   CR  0.83  0.87  0.83  1.20   GFRESTIMATED  >90  Non  GFR Calc    89  >90  Non  GFR Calc    61         Liudmila (Ken) MD [facility]  Vascular Surgery Fellow  [phone] (p)

## 2017-05-29 NOTE — PROGRESS NOTES
Vascular surgery--CTA of head and neck reviewed.  Right ICA is occluded from its origin to the supraclinoid segment.  Left ICA and both vertebral arteries patent without significant disease. Occlusion is of acute/chronic nature no indication for operative intervention at this point.

## 2017-05-29 NOTE — PROGRESS NOTES
SP[facility]UAL [facility] SERVICES Progress Note  [facility] ICU    Attempted to meet with pt/family, but no family members were present.  Per chart and conversation with pt's nurse, I have learned that pt has a sister Nikkie and a brother Reid who are next of kin.  Pt has been living with a nephew at least for the past month.  SH team is available for pt/family support, per need or request.                                                                                                                                                 Karissa Arguelles M.A.  Staff   Pager [phone]  Phone [phone]

## 2017-05-29 NOTE — PROGRESS NOTES
[facility]    Daily Progress Note  Critical Care Service     Date of Admission:  5/26/2017  Date of Service (when I saw the patient): May 29, 2017      Assessment & Plan   Jair Fernández is a 66 year old male who presents with AMS.  EMS  Found pt unresponsive outside on the ground of a local [employer]. Brought to the ED  The patient was also hypotensive with systolic pressures in the 70s.  Intubated there.  Per pt's sister he has a history of neck cancer and surgery, heavy EtOH use, smoker. Weaned off pressors, now hypertensive, EEG showed no seizure, MRI did not show obvious acute stroke.    5/28: More alert this AM, following commands but not really moving left side  5/29: MRI 5/28 showed significant inrease in R sided infarct, seen be neuro and vascular surgery, no further intervention felt to be warranted        Neuro  1. Large R sided CVA  2. Left sided weakness   3. Hx multiple strokes and chronic right carotid occlusion per MRI  Plan:  -- Discussed with Neuro, given overall situation feel comfort care appropriate, family has been contacted will consult palliatove care and plan for care conference 5/30      CV  1. Hypotension resolved, lactate now normal  Plan:        Resp:  1. Acute respiratory failure, per report, pt intubated 2nd to emesis and AMS in ED.  On minimal vent support but concern for ability to clear secretions at this point.  Plan:  -- AC ventilation 16* 450 +5  -- PST later today, and daily   -- Empiric abx for aspiration    GI/Nutrition  1. Malnutrition, raul/pro deficit  Plan:  -- PPI  -- On TF    Renal  No known hx.  Creatinine 1.2 on admit, now 0.8  Plan:      Endocrine  1. Hyperglycemia  Plan:  --  Insulin gtt per protocol if indicated  --  Keep BG  <180 for optimal healing    Heme:  1. Leukocytosis  Plan:  -- Monitor for other s/s infection    ID  1. ? Sepsis with elevated WBC, lactate, no clear source, procal neg  -Empiric zosyn for possible aspiration  pneumonia.      General cares:  DVT Prophylaxis: Pneumatic Compression Devices, heparin sq  GI Prophylaxis: PPI  Restraints: Restraints for medical healing needed: YES  Family update by me today: No.  SW informed of lack of family/hx, will look into it.  Current lines are required for patient management  Access:  PIV, will ask for PICC emergently as unable to get more access.      David Morris Perlman    Time Spent on this Encounter   Billing:  I spent 35 minutes bedside and on the inpatient unit today managing the critical care of Jair Fernández in relation to the issues listed in this note.    Code Status   DNR per discussion with patients sister    Primary Care Physician   None Doctor    Chief Complaint   AMS    Interval History last 24h  No events overnight, no seizure on EEG, sedated on propofol.  DNR per discussion with patient's daughter    Review of Systems   Review of systems not obtained due to patient factors - intubation and sedation    Physical Exam   Temp: 97.7  F (36.5  C) Temp src: Esophageal Temp  Min: 97.5  F (36.4  C)  Max: 99  F (37.2  C) BP: 138/79  Heart Rate: 103 Resp: 17 SpO2: 95 % O2 Device: Mechanical Ventilator    Vital Signs with Ranges  Temp:  [97.5  F (36.4  C)-99  F (37.2  C)] 97.7  F (36.5  C)  Heart Rate:  [] 103  Resp:  [14-28] 17  BP: ()/(47-79) 138/79  FiO2 (%):  [40 %] 40 %  SpO2:  [91 %-100 %] 95 %  119 lbs 6.4 oz    Constitutional:  Intubated, appears chronically ill  Eyes:  pupils equal, round and reactive to light, sclera clear, conjunctiva normal.  Respiratory:  clear to auscultation bilaterally, no crackles or wheezing.  Cardiovascular: Normal apical impulse, regular rate and rhythm, normal S1 and S2, and no murmur noted.  GI: normal bowel sounds, soft, non-distended, non-tender, no guarding  Genitourinary:  Normal external anatomy, orona, urine yellow and clear  Skin: very dry, nails unkept,   Musculoskeletal: MC but weaker on left, still with decreased LOC,    Neurologic: eyes open and follows commands, right side moves very purposefully, not really moving L side  Neuropsychiatric: ANN    Data   Results for orders placed or performed during the hospital encounter of 05/26/17 (from the past 24 hour(s))   MR Brain w/o Contrast    Narrative    MR BRAIN WITHOUT CONTRAST  5/28/2017 2:44 PM     HISTORY:  Left-sided weakness.     TECHNIQUE: Multiplanar, multisequence MRI of the brain without IV  contrast material.    COMPARISON: Scan performed on 5/26/2017.    FINDINGS:  Diffusion-weighted images reveal areas of restricted  diffusion in the right temporal and right frontal lobes. The largest  area is in the anterior division of the right middle cerebral artery.  This area of restricted diffusion measures about 10 cm in AP dimension  by 2.7 cm in transverse dimension. A small area of restricted  diffusion is seen in the medial right frontal lobe. This is in the  distribution of the right anterior cerebral. There are small areas of  restricted diffusion in the cortex of the right temporal lobe and in  the deep white matter of the right parietal lobe.       The facial  structures appear normal.  Abnormal flow void in the right internal  carotid. The cavernous segment of the right internal carotid appears  occluded. This is also seen on the recent CT angiogram.      Impression    IMPRESSION:  1. Evolving infarct in the right middle cerebral artery distribution.  This area of infarction is significantly larger than on the previous  MR scan of 5/26/2017. It now involves most of the anterior division of  the right middle cerebral. Smaller areas of infarction are seen in the  right temporal lobe and the medial aspect of the right frontal lobe.  2. Right internal carotid artery is occluded.     FABRICE RM MD   Vitamin B12   Result Value Ref Range    Vitamin B12 312 193 - 986 pg/mL   Basic metabolic panel   Result Value Ref Range    Sodium 140 133 - 144 mmol/L    Potassium 3.2 (L) 3.4 -  5.3 mmol/L    Chloride 109 94 - 109 mmol/L    Carbon Dioxide 25 20 - 32 mmol/L    Anion Gap 6 3 - 14 mmol/L    Glucose 108 (H) 70 - 99 mg/dL    Urea Nitrogen 14 7 - 30 mg/dL    Creatinine 0.83 0.66 - 1.25 mg/dL    GFR Estimate >90  Non  GFR Calc   >60 mL/min/1.7m2    GFR Estimate If Black >90   GFR Calc   >60 mL/min/1.7m2    Calcium 8.2 (L) 8.5 - 10.1 mg/dL   Magnesium   Result Value Ref Range    Magnesium 2.1 1.6 - 2.3 mg/dL   Phosphorus   Result Value Ref Range    Phosphorus 1.9 (L) 2.5 - 4.5 mg/dL   CBC with platelets   Result Value Ref Range    WBC 16.1 (H) 4.0 - 11.0 10e9/L    RBC Count 3.43 (L) 4.4 - 5.9 10e12/L    Hemoglobin 10.3 (L) 13.3 - 17.7 g/dL    Hematocrit 31.2 (L) 40.0 - 53.0 %    MCV 91 78 - 100 fl    MCH 30.0 26.5 - 33.0 pg    MCHC 33.0 31.5 - 36.5 g/dL    RDW 15.3 (H) 10.0 - 15.0 %    Platelet Count 173 150 - 450 10e9/L

## 2017-05-29 NOTE — PROGRESS NOTES
VSS, no neuro changes. Palliative consult place for tomorrow. Sister and brother called and set up time for 4 pm tomorrow. Dr Brandt asked they come before noon, but they are not able due to work. Tolerating tube feedings.

## 2017-05-30 NOTE — PROGRESS NOTES
[facility]  [facility] [place]  Neuro-ICU Progress Note       Admission Date: 5/26/2017  Date of Service:05/30/2017   Hospital Day: 5     ADDENDUM: 5/30/2017  12:24 PM  Family insists on Full code  Appropriate order placed.   ----------------------------------------------------  Lance Brandt MD, PhD, [facility]      _________________________________     Main Plans for Today   --Continue supportive care  --palliative care meeting with family today  Assessment & Plan   #. (I63.9) Cerebrovascular accident (CVA), unspecified mechanism (H)  (primary encounter diagnosis)  --Significant acute extension of right sided strokes on MRI  --Previous multiple strokes  --Right ICA occlusion is likely chronic, given chronic right sided strokes  #. (G40.909) Recurrent seizures (H)  --received 4 mg of Ativan  --loaded with fosphenytoin  --EEG showed right attenuation, some left frontotemporal sharply contoured activity, but otherwise was unremarkable with no seizures  --phenytoin level 12.3 on 5/28- continue scheduled fosphenytoin 100 mg BID.  -----Repeat phenytoin level 5/30 is 7  #. (M62.81) Left-sided weakness  --See above  #. (J96.00) Acute respiratory failure, unspecified whether with hypoxia or hypercapnia (HCC)  --Management per medical intensivists  --Patient is not-ready for weaning from neurological standpoint  #. Cardiovascular  --No acute issues  #. Infection   --Elevated WBC  --Management per medical intensivists.   #. Endocrine:   --Insulin protocol to keep blood sugars 100-150.  #. Heme/Onc:   --mild anemia, stable  #. Renal  --Sodium goal 140-155  #. Skin/Musculoskeletal:  --No issues  #. PT/OT/Speech  --hold evaluations   #. Nutrition  --Per speech therapy evaluation   #. ICU prophylaxis:   --Stress ulcer prophylaxis: Protonix  --VAP: per protocol  --DVT prophylaxis: mechanical + heparin      CODE STATUS: DNR        Interval History   No changes. No improvement. No movement on the  "left. Following commands on the right. Nodding/shaking head to communicate. Denies pain.    Physical Exam     Vitals: Blood pressure 109/55, pulse (!) 0, temperature 96.4  F (35.8  C), resp. rate 8, height 1.651 m (5' 5\"), weight 54.2 kg (119 lb 6.4 oz), SpO2 98 %.  Tmax: Temp (24hrs), Av.7  F (37.1  C), Min:98.2  F (36.8  C), Max:99  F (37.2  C)    Vital Signs with Ranges  Temp:  [96.4  F (35.8  C)-98.4  F (36.9  C)] 96.4  F (35.8  C)  Heart Rate:  [] 73  Resp:  [8-38] 8  BP: (109-165)/(54-85) 109/55  FiO2 (%):  [40 %] 40 %  SpO2:  [89 %-100 %] 98 %   I&O:     I/O last 3 completed shifts:  In: 3784.9 [I.V.:2464.9; NG/GT:360]  Out: 2875 [Urine:2875] I/O since admission: +5480.28         General Appearance:   No acute distress  Neuro:       Mental Status Exam:  Sedated, intubated, comatose. S/L untestable due to intubation. Eyes open, not tracking. Nods/shakes head to communicate. Follows commands on the right.  Mental status is decreased and affected by sedation       Cranial Nerves:  Pupils 3 mm, reactive. Occulocephalis reflexes are present.  Corneal reflexes present. Face symmetric. Gag/Cough reflex present.Other CN are untestable           Motor:  Left sided plegia, good strength on the right       Reflexes:  Low DTR, toes equivocal       Sensory:  Untestable                    Coordination:   Untestable       Gait:  Untestable  Cardiovascular: Regular rate and rhythm, no m/r/g  Lungs: Ventilation Mode: CMV/AC  FiO2 (%): 40 %  Rate Set (breaths/minute): 16 breaths/min  Tidal Volume Set (mL): 450 mL  PEEP (cm H2O): 5 cmH2O  Oxygen Concentration (%): 40 %  Resp: 8  Both hemithoraces are symmetrical, auscultation of lungs revealed no bronchovesicular sounds, expirium prolongation, wheezing, rhonci and crackles  Abdomen: Soft, not tender, not distended  Skin/Extremities: No clubbing, cyanosis, no edema     Medications   Infusions medications:    NaCl       propofol (DIPRIVAN) infusion 40 mcg/kg/min " (05/30/17 0530)     - MEDICATION INSTRUCTIONS -       IV fluid REPLACEMENT ONLY       Schedule medications:    pantoprazole  40 mg Per Feeding Tube Daily     fosphenytoin (CEREBYX) intermittent infusion (maintenance)  100 mg PE Intravenous Q12H     piperacillin-tazobactam  3.375 g Intravenous Q6H     chlorhexidine  15 mL Mouth/Throat Q12H     sodium chloride (PF)  10 mL Intracatheter Q8H     heparin  5,000 Units Subcutaneous Q8H     thiamine  250 mg Intravenous Daily    Followed by     [START ON 5/31/2017] thiamine  200 mg Oral or Feeding Tube Daily     albuterol  2.5 mg Nebulization Q6H     PRN medications:fentaNYL, potassium chloride, potassium chloride, potassium chloride, potassium chloride with lidocaine, potassium chloride, magnesium sulfate, magnesium sulfate, potassium phosphate (KPHOS) in D5W IV, potassium phosphate (KPHOS) in D5W IV, potassium phosphate (KPHOS) in D5W IV, potassium phosphate (KPHOS) in D5W IV, potassium phosphate (KPHOS) in D5W IV, ondansetron **OR** ondansetron, senna-docusate, polyethylene glycol, sodium phosphate, naloxone, - MEDICATION INSTRUCTIONS -, lidocaine 4%, heparin lock flush, sodium chloride (PF), IV fluid REPLACEMENT ONLY  Data       Labotary Data:   All data was reviewed by me personally  CBC RESULTS:  Recent Labs   Lab Test  05/30/17 0445 05/29/17 0435 05/28/17 0415 05/27/17   0555  05/26/17   1200   WBC  13.1*  16.1*  18.0*  20.7*  12.2*   RBC  3.54*  3.43*  3.81*  3.93*  3.90*   HGB  10.8*  10.3*  11.6*  11.9*  11.8*   HCT  32.2*  31.2*  34.7*  35.7*  35.8*   PLT  240  173  169  237  236     Basic Metabolic Panel:  Recent Labs   Lab Test  05/30/17 0445 05/29/17 0435 05/28/17 0415  05/27/17   0555  05/26/17   1200   NA  144  140  135  135  137   POTASSIUM  3.7  3.2*  5.0  3.9  4.2   CHLORIDE  112*  109  104  103  105   CO2  25  25  23  25  22   BUN  10  14  13  12  15   CR  0.70  0.83  0.87  0.83  1.20   GLC  104*  108*  125*  163*  156*   DARINEL  8.6  8.2*   8.0*  8.3*  7.7*     ABG:  Recent Labs   Lab Test  05/28/17   0612  05/27/17   0529  05/26/17   1155   PH  7.40  7.44  7.34*   PCO2  38  32*  39   PO2  109*  111*  151*   HCO3  23  22  21   O2PER   --    --   40%     Liver panel:  Recent Labs   Lab Test  05/30/17   0445  05/28/17   0415  05/27/17   0555  05/26/17   1730   PROTTOTAL  6.4*  6.3*  6.7*  6.4*   ALBUMIN  2.1*  2.5*  3.2*  2.8*   BILITOTAL  0.6  0.6  0.5  0.4   ALKPHOS  209*  57  52  54   AST  56*  31  25  22   ALT  25  18  17  18     Procalcitonin:   Recent Labs   Lab Test  05/26/17   1730   PCAL  <0.05  <0.05 ng/ml  Normal  Recommendation: Very low risk of bacterial infection.   Discourage antibiotics unless strong clinical suspicion for serious infection.       Coagulation  Recent Labs   Lab Test  05/26/17   1200   INR  1.10   PTT  28      Lipid Profile:No lab results found.  Thyroid Panel:No lab results found.   Vitamin B12:   Recent Labs   Lab Test  05/28/17   1700   B12  312      Vitamin D:No lab results found.  A1C: No lab results found.  Troponin I:   Recent Labs   Lab Test  05/26/17   1730   TROPI  <0.015  The 99th percentile for upper reference range is 0.045 ug/L.  Troponin values in   the range of 0.045 - 0.120 ug/L may be associated with risks of adverse   clinical events.       Ammonia:   Recent Labs   Lab Test  05/28/17   0415   MOOK  24     Most Recent 6 Bacteria Isolates From Any Culture (See EPIC Reports for Culture Details):  Recent Labs   Lab Test  05/26/17   1730  05/26/17   1200   CULT  No growth after 4 days  No growth after 4 days     UA Results:  Recent Labs   Lab Test  05/26/17   1515   COLOR  Light Yellow   APPEARANCE  Clear   URINEGLC  70*   URINEBILI  Negative   URINEKETONE  Negative   SG  1.015   UBLD  Negative   URINEPH  7.0   PROTEIN  Negative   NITRITE  Negative   LEUKEST  Negative   RBCU  0   WBCU  <1          Cardiac US:   The visual ejection fraction is estimated at 60-65%. No regional wall motion abnormalities noted.  The study was technically difficult. Limited views were obtained.       Neurophysiology:   5/26/17:  Encephalopathic EEG with significant asymmetry between hemispheres.        Imaging:   All imaging studies were reviewed personally  CT head 5/26/17:   1. Probable recent although not definitely acute border zone infarcts in the high right cerebral hemisphere.  2. No definite acute infarcts noted.  3. Diffuse cerebral volume loss and cerebral white matter changes consistent with chronic small vessel ischemic disease.    CT perfusion 5/26/17:   --There is delayed arrival of the contrast bolus to the right anterior cerebral and right middle cerebral artery territories consistent with the known right internal carotid artery occlusion. This results in mildly decreased cerebral blood flow but increased cerebral blood volume to the right KELIN and right MCA territories suggesting vasodilatation of the right KELIN and right MCA territories to accommodate for decreased cerebral blood flow. There are no findings to suggest acute ischemia or infarct. Perfusion defects corresponding to chronic right frontal and right parietal infarcts are noted. No other perfusion abnormalities.    CT angiography neck/head 5/26/17:   1. Age-indeterminate complete occlusion of the right internal carotid artery from the origin to the supraclinoid segment. Given chronic  appearing right cerebral hemisphere infarcts, there is likely long-standing disease in the right internal carotid artery although  the time of occlusion cannot be determined.  2. Minimal calcified atherosclerotic plaque at the origin of the left internal carotid artery that does not result in stenosis.  3. Otherwise, normal neck and head CTA.    MRI brain 5/28/17:   1. Evolving infarct in the right middle cerebral artery distribution. This area of infarction is significantly larger than on the previous MR scan of 5/26/2017. It now involves most of the anterior division of the right  middle cerebral. Smaller areas of infarction are seen in the right temporal lobe and the medial aspect of the right frontal lobe.  2. Right internal carotid artery is occluded.       Time Spent on this Encounter      Time:   Neurocritical care time:  I spent 50 minutes bedside and on the inpatient unit today managing the neurocritical care of Jair Fernández in relation to the issues listed in this note. Patient is critically ill       Ayesha Jacob, FNP-BC

## 2017-05-30 NOTE — PROGRESS NOTES
Pt is extubated on Aerosol Mask 40% 10LPM, 02 Sats 97%. No complications noted post extubation. Lung sounds coarse throughout. Will continue to follow.  5/30/2017  Nazanin Lindquist

## 2017-05-30 NOTE — PROGRESS NOTES
"Original family care conference was supposed to be today at 1600, per NOC RN. Neither neuro or palliative care could make it at that time. This RN called shawnaspok Reaves, and rescheduled to 0900 tomorrow. Then the brother of the patient called at 1430 today saying he'd be late to the meeting today. He was informed that it had been cancelled and when asked if he'd talked to his sister today, he said they don't talk or get along. He was quite angry at not being updated and that he had rearranged his work schedule. He said this conference could not happen tomorrow without him. He stated he would not contact Jelly to get this rescheduled but he could do 0900 either Thursday or Friday. At the time he was on the phone, the care coordinator came to bedside. She was informed of the issue at hand and she stated she would call Jelly and get the meeting scheduled for either Thursday or Friday. She said she would \"handle it\". Dr. Brandt informed of cancelled family care conference. Palliative care will be paged now. Monitor.   "

## 2017-05-30 NOTE — PROGRESS NOTES
Palliative consult received. Case reviewed with bedside RN Jaylin and Dr. Brandt. Care conference arranged for Wed 5/31 at 0900. I will be present, along with Neurology. Thanks.    Evelia SEN, [facility]  Palliative Medicine   Pager: [phone]

## 2017-05-30 NOTE — PROGRESS NOTES
CLINICAL NUTRITION SERVICES - REASSESSMENT NOTE      Recommendations Ordered by Registered Dietitian (RD):   Change TF now to Isosource 1.5 at 40 mL/hr;  After 12 hrs increase to 50 mL/hr = 1800 kcals (33 kcal/kg), 82 gm pro (1.5 gm/kg), 912 mL H20, 18 gm fiber, 211 gm CHO       EVALUATION OF PROGRESS TOWARD GOALS   Diet:  NPO on vent    Nutrition Support:  TF was started on  (0300) at 15 mL/hr and increased every 12 hrs to goal 40 mL/hr, which was achieved on  (0400).    Nutrition Support Enteral:  Type of Feeding Tube:  NG --> Plan to change to a keofeed today.  Enteral Frequency:  Continuous  Enteral Regimen:  Peptamen Intense VHP at 40 mL/hr  Total Enteral Provisions:  960 kcals (18 kcal/kg and 59% energy needs), 89 gm pro (1.6 gm/kg), 806 mL H20, 4 gm fiber, 75 gm CHO  Free Water Flush:  60 mL every 4 hrs    The D5 IVF at 100 mL/hr was discontinued today.  The Propofol at 13 mL/hr was discontinued today.    Intake/Tolerance:  No record BM.  Bowel regimen started today (Dulcolax).  Na 144 (goal Na 140-155)  Refeeding labs were as follows (-):  K:  3.9 --> 5.0 --> 3.2 (L) --> 3.7 (NL) - acceptable  M.8 --> 2.3 --> 2.1 --> 2.0 (NL) - acceptable  Phos:  2.9 --> 2.6 --> 1.9 (L) --> 3.0 (NL) - acceptable  Serum glucose:  125, 108, 104 - acceptable        Wt:  54.1 kg (stable from admit wt).  No edema.     Dosing Weight 54.2 kg - admit     ASSESSED NUTRITION NEEDS PER APPROVED PRACTICE GUIDELINES:  (Modified energy needs higher)  Estimated Energy Needs: 7271-1703 kcals (30-35 Kcal/Kg)  Justification: repletion  Estimated Protein Needs:  grams protein (1.5-2 g pro/Kg)  Justification: Repletion      NEW FINDINGS:   Care conference with family pending this week.  NaCl at 100 mL/hr - for fluid delivery.  Thiamine - for ETOH history.    Previous Goals ():   Peptamen Intense VHP + Prop + D5 to meet % estimated needs in the next 48-60 hours.  Evaluation: Not met, as both Propofol and D5  IVF off.    Previous Nutrition Diagnosis (5/26):   Inadequate protein-energy intake related to inability for PO 2/2 intubation as evidenced by plan for TF as pt currently meeting ~40% est energy needs and 0% protein needs via D5 and Propofol administration  Evaluation: Improving      CURRENT NUTRITION DIAGNOSIS  Inadequate energy intake related to isocaloric TF and Propofol and D5 IVF now off as evidenced by TF meeting only 59% energy needs.    INTERVENTIONS  Recommendations / Nutrition Prescription  Change TF now to Isosource 1.5 at 40 mL/hr;  After 12 hrs increase to 50 mL/hr = 1800 kcals (33 kcal/kg), 82 gm pro (1.5 gm/kg), 912 mL H20, 18 gm fiber, 211 gm CHO    Implementation  Collaboration and Referral of Nutrition care - Discussed pt during ICU interdisciplinary rounds this morning.  EN Composition, EN Schedule - Changed TF order in EPIC as above.    Goals  TF Isosource 1.5 at 50 mL/hr will meet % estimated needs.    MONITORING AND EVALUATION:  Progress towards goals will be monitored and evaluated per protocol and Practice Guidelines    Mary Gruber, RD, LD, CNSC

## 2017-05-30 NOTE — PROGRESS NOTES
[facility] ICU RESPIRATORY NOTE  Date of Admission: 5/26/17  Date of Intubation (most recent): 5/26/17  Reason for Mechanical Ventilation: Acute respiratory failure 2/2 CVA  Number of Days on Mechanical Ventilation:   Met Criteria for Pressure Support Trial: Yes  Length of Pressure Support Trial: 2hrs PS 7/5    Significant Events Today: None     Ventilation Mode: CMV/AC  FiO2 (%): 40 %  Rate Set (breaths/minute): 16 breaths/min  Tidal Volume Set (mL): 450 mL  PEEP (cm H2O): 5 cmH2O  Oxygen Concentration (%): 40 %  Resp: 18    ABG Results:    Recent Labs  Lab 05/28/17  0612 05/27/17  0529 05/26/17  1155   PH 7.40 7.44 7.34*   PCO2 38 32* 39   PO2 109* 111* 151*   HCO3 23 22 21   O2PER  --   --  40%       ETT appearance on chest x-ray: ETT in good position.    Plan:  Will continue on full vent support and assess for daily weaning.    Nazanin Ferreira RT  5/30/2017

## 2017-05-30 NOTE — PLAN OF CARE
No neuro changes noted.  VSS.  Plan for palliative care consult this afternoon at 1600 including neuro with brother and sister.

## 2017-05-30 NOTE — CONSULTS
Care Coordinator (CC) received a call from Reid Fernández (pt's brother) who is very upset that care conference was cancelled today at 4PM.   CC explained to Reid that the Neurologist is not available at 4PM today.  Reid states that he's available between 9AM-12 noon on Thursday or Friday.  CC spoke with Nikkie who agreed to care conference at 9AM on Thursday.  Per Nikkie, pt used to live with their father who has severe dementia and who she just recently placed at [facility].  Their mom passed away in 2006.  Pt has two siblings (Nikkie and Reid) and has no spouse or children.  Nikkie is not aware if pt has a health care directive.  Nikkie states she's overwhelmed taking care of dad and now Marvin.  Palliattive aware of family conference on Thursday at 9AM.  Awaiting for Dr Brandt to confirm if available on Thursday.

## 2017-05-30 NOTE — PROGRESS NOTES
Critical Care Progress Note      05/30/2017    Name: Jair Fernández MRN#: [id number]   Age: 66 year old YOB: 1950     [facility] Day# 4               Problem List:   Active Problems:    Altered mental status, unspecified altered mental status type    Cerebrovascular accident (H)           Summary/Hospital Course:     Jair Fernández is a 66 year old male who presents with AMS.  EMS  Found pt unresponsive outside on the ground of a local [employer]. Brought to the ED  The patient was also hypotensive with systolic pressures in the 70s.  Intubated there.  Per pt's sister he has a history of neck cancer and surgery, heavy EtOH use, smoker. Weaned off pressors, now hypertensive, EEG showed no seizure, MRI 5/28 showed acutely worsened stroke--R sided infarcts.      Assessment and plan :     Jair Fernández IS a 66 year old male admitted on 5/26/2017 for altered mental status/CVA.   I have personally reviewed the daily labs, imaging studies, cultures and discussed the case with referring physician and consulting physicians.     My assessment and plan by system for this patient is as follows:    Neurology/Psychiatry:   1. Large R CVA:  Appreciate neurology/neuro CC input.    2. Analgesia:  Fentanyl prn  3. Anxiety:  Propofol  4.  H/o etoh use:  Monitor for withdrawal.  benzos prn.  Thiamine supplementation.    Cardiovascular:   1.Hypotension:  Resolved.  Allow permissive hypertension in setting of recent CVA.    Pulmonary/Ventilator Management:   1. Loss of airway protective reflexes:  Intubated for airway protection.  Minimal vent settings. Much more awake/alert today.  Will attempt extubation.    GI and Nutrition :   1. Malnutrition:  TF.    2.  PPI for PUD prophy  3.  Refeeding syndrome:  Hypophos.  aggressvie phos repletion.  Slow TF if needed.    Renal/Fluids/Electrolytes:   1. KENYATTA resolved.  Monitor.    Infectious Disease:   1. Leukocytosis improving.  cx neg x4d, procal neg.  Stop  zosyn.    Endocrine:   1. Monitor fsg:  Insulin vs dextrose prn.    Hematology/Oncology:   1. Leukocytosis:  Resolving  2.  Anemia: stable, no active bleeding.  3. pltlts ok.    ICU Prophylaxis:   1. DVT: Hep Subq/mechanical  2. VAP: HOB 30 degrees, chlorhexidine rinse  3. Stress Ulcer: PPI  4. Restraints: Nonviolent soft two point restraints required and necessary for patient safety and continued cares and good effect as patient continues to pull at necessary lines, tubes despite education and distraction. Will readdress daily.   5. IV Access - central access required and necessary for continued patient cares  6. Feeding - tf  7. Family Update: per neuro CC:  Made full code by sister Nikkie today. Family mtg tomorrow.   8. Disposition - critically ill        Key goals for next 24 hours:   1. Stopping zoysn  2. Family meeting tomorrow to discuss goals of care  3. Aggressive phos repletion.               Interim History:     No events overnight. Now awake/alert/following.         Key Medications:       lidocaine   Topical Once     metoclopramide  10 mg Intravenous Once     pantoprazole  40 mg Per Feeding Tube Daily     fosphenytoin (CEREBYX) intermittent infusion (maintenance)  100 mg PE Intravenous Q12H     piperacillin-tazobactam  3.375 g Intravenous Q6H     chlorhexidine  15 mL Mouth/Throat Q12H     sodium chloride (PF)  10 mL Intracatheter Q8H     heparin  5,000 Units Subcutaneous Q8H     thiamine  250 mg Intravenous Daily    Followed by     [START ON 5/31/2017] thiamine  200 mg Oral or Feeding Tube Daily     albuterol  2.5 mg Nebulization Q6H       NaCl 100 mL/hr at 05/30/17 1004     propofol (DIPRIVAN) infusion 40 mcg/kg/min (05/30/17 1203)     - MEDICATION INSTRUCTIONS -       IV fluid REPLACEMENT ONLY                 Physical Examination:   Temp:  [95.4  F (35.2  C)-98.6  F (37  C)] 98.6  F (37  C)  Heart Rate:  [73-99] 91  Resp:  [8-25] 16  BP: (109-167)/(54-93) 162/93  FiO2 (%):  [40 %] 40 %  SpO2:  [89  %-100 %] 100 %      Intake/Output Summary (Last 24 hours) at 05/30/17 1331  Last data filed at 05/30/17 1200   Gross per 24 hour   Intake             3749 ml   Output             2925 ml   Net              824 ml         Wt Readings from Last 4 Encounters:   05/26/17 54.2 kg (119 lb 6.4 oz)     BP - Mean:  [] 108  Ventilation Mode: CMV/AC  FiO2 (%): 40 %  Rate Set (breaths/minute): 16 breaths/min  Tidal Volume Set (mL): 450 mL  PEEP (cm H2O): 5 cmH2O  Oxygen Concentration (%): 40 %  Resp: 16    Recent Labs  Lab 05/28/17  0612 05/27/17  0529 05/26/17  1155   PH 7.40 7.44 7.34*   PCO2 38 32* 39   PO2 109* 111* 151*   HCO3 23 22 21   O2PER  --   --  40%       GEN: no acute distress, mildly sedated but awake   HEENT: head ncat, sclera anicteric, OP patent, trachea midline   PULM: unlabored synchronous with vent, clear anteriorly    CV/COR: RRR S1S2 no gallop,  No rub, no murmur  ABD: soft nontender, no mass  EXT:  No Edema ;  Warm x4  NEURO: awake, alert follows.  Moves R well, hemiplegic on L.  SKIN: no obvious rash  LINES: clean, dry intact         Data:   All data and imaging reviewed     ROUTINE ICU LABS (Last four results)  CMP  Recent Labs  Lab 05/30/17  0445 05/29/17  0435 05/28/17  0415 05/27/17  0555 05/26/17  1730    140 135 135  --    POTASSIUM 3.7 3.2* 5.0 3.9  --    CHLORIDE 112* 109 104 103  --    CO2 25 25 23 25  --    ANIONGAP 7 6 8 7  --    * 108* 125* 163*  --    BUN 10 14 13 12  --    CR 0.70 0.83 0.87 0.83  --    GFRESTIMATED >90Non  GFR Calc >90Non  GFR Calc 89 >90Non  GFR Calc  --    GFRESTBLACK >90African American GFR Calc >90African American GFR Calc >90African American GFR Calc >90African American GFR Calc  --    DARINEL 8.6 8.2* 8.0* 8.3*  --    MAG  --  2.1 2.3 1.8 1.9   PHOS  --  1.9* 2.6 2.9 3.3   PROTTOTAL 6.4*  --  6.3* 6.7* 6.4*   ALBUMIN 2.1*  --  2.5* 3.2* 2.8*   BILITOTAL 0.6  --  0.6 0.5 0.4   ALKPHOS 209*  --  57 52 54    AST 56*  --  31 25 22   ALT 25  --  18 17 18     CBC  Recent Labs  Lab 05/30/17  0445 05/29/17  0435 05/28/17  0415 05/27/17  0555   WBC 13.1* 16.1* 18.0* 20.7*   RBC 3.54* 3.43* 3.81* 3.93*   HGB 10.8* 10.3* 11.6* 11.9*   HCT 32.2* 31.2* 34.7* 35.7*   MCV 91 91 91 91   MCH 30.5 30.0 30.4 30.3   MCHC 33.5 33.0 33.4 33.3   RDW 15.7* 15.3* 15.0 14.5    173 169 237     INR  Recent Labs  Lab 05/26/17  1200   INR 1.10     Arterial Blood Gas  Recent Labs  Lab 05/28/17  0612 05/27/17  0529 05/26/17  1155   PH 7.40 7.44 7.34*   PCO2 38 32* 39   PO2 109* 111* 151*   HCO3 23 22 21   O2PER  --   --  40%       All cultures:    Recent Labs  Lab 05/26/17  1730 05/26/17  1200   CULT No growth after 4 days No growth after 4 days     No results found for this or any previous visit (from the past 24 hour(s)).    EEG:  Suppression of L hemisphere.  No epileptiform discharges.    Billing: This patient is critically ill: Yes. Total critical care time today 35 min.

## 2017-05-31 NOTE — PROGRESS NOTES
[facility]  [facility] [place]  Neuro-ICU Progress Note       Admission Date: 5/26/2017  Date of Service:05/31/2017   Hospital Day: 6     _________________________________     Main Plans for Today   --Continue supportive care  --meeting with family today  Assessment & Plan   #. (I63.9) Cerebrovascular accident (CVA), unspecified mechanism (H)  (primary encounter diagnosis)  --Significant acute extension of right sided stroke on MRI  --Previous multiple strokes  --Right ICA occlusion is likely chronic, given chronic right sided strokes  --no improvements  #. (G40.909) Recurrent seizures (H)  --received 4 mg of Ativan  --loaded with fosphenytoin  --EEG showed right attenuation, some left frontotemporal sharply contoured activity, but otherwise was unremarkable with no seizures  --phenytoin level 12.3 on 5/28- continue scheduled fosphenytoin 100 mg BID.  -----Repeat phenytoin level 5/30 is 7  #. (M62.81) Left-sided weakness  --See above  #. (J96.00) Acute respiratory failure, unspecified whether with hypoxia or hypercapnia (HCC)  --extubated on 5/30  ----on BiPAP  #. Cardiovascular  --No acute issues  #. Infection   --Elevated WBC  --Management per medical intensivists.   #. Endocrine:   --Insulin protocol to keep blood sugars 100-150.  #. Heme/Onc:   --mild anemia, stable  #. Renal  --Sodium goal 140-155  #. Skin/Musculoskeletal:  --No issues  #. PT/OT/Speech  --hold evaluations   #. Nutrition  --Per speech therapy evaluation   #. ICU prophylaxis:   --Stress ulcer prophylaxis: Protonix  --VAP: per protocol  --DVT prophylaxis: mechanical + heparin      CODE STATUS: FULL        Interval History   No changes. No improvement. No movement on the left. Following commands on the right. Nodding/shaking head to communicate. Denies pain. Extubated on 5/30 evening. On BiPAP currently. Meeting with family and palliative this morning.    Physical Exam     Vitals: Blood pressure (!) 184/106, pulse (!)  "0, temperature 98.2  F (36.8  C), temperature source Axillary, resp. rate (!) 36, height 1.651 m (5' 5\"), weight 55.1 kg (121 lb 7.6 oz), SpO2 93 %.  Tmax: Temp (24hrs), Av.7  F (37.1  C), Min:98.2  F (36.8  C), Max:99  F (37.2  C)    Vital Signs with Ranges  Temp:  [95.4  F (35.2  C)-99.1  F (37.3  C)] 98.2  F (36.8  C)  Heart Rate:  [] 133  Resp:  [10-45] 36  BP: (139-219)/() 184/106  FiO2 (%):  [40 %-50 %] 50 %  SpO2:  [89 %-100 %] 93 %   I&O:  I/O this shift:  In: 150 [I.V.:150]  Out: 200 [Urine:200]  I/O last 3 completed shifts:  In: 2700 [I.V.:2480; NG/GT:60]  Out: 4015 [Urine:4015] I/O since admission: +4115.28         General Appearance:   No acute distress  Neuro:       Mental Status Exam:  Off sedation, on BiPAP, lethargic. S/L untestable due to BiPAP. Eyes open, not tracking. Nods/shakes head to communicate. Follows commands on the right.  Mental status is decreased        Cranial Nerves:  Pupils 3 mm, reactive. Occulocephalis reflexes are present.  Corneal reflexes present. Face symmetric. Gag/Cough reflex present.Other CN are untestable           Motor:  Left sided plegia, good strength on the right       Reflexes:  Low DTR, toes equivocal       Sensory:  Untestable                    Coordination:   Untestable       Gait:  Untestable  Cardiovascular: Regular rate and rhythm, no m/r/g  Lungs: Ventilation Mode: PS  FiO2 (%): 50 %  Rate Set (breaths/minute): 16 breaths/min  Tidal Volume Set (mL): 450 mL  PEEP (cm H2O): 5 cmH2O  Pressure Support (cm H2O): 5 cmH2O  Oxygen Concentration (%): 40 %  Resp: 36  Both hemithoraces are symmetrical, auscultation of lungs revealed no bronchovesicular sounds, expirium prolongation, wheezing, rhonci and crackles  Abdomen: Soft, not tender, not distended  Skin/Extremities: No clubbing, cyanosis, no edema     Medications   Infusions medications:    NaCl 100 mL/hr at 170     propofol (DIPRIVAN) infusion Stopped (17 1504)     - MEDICATION " INSTRUCTIONS -       IV fluid REPLACEMENT ONLY       Schedule medications:    albuterol  15 mg Nebulization Once     pantoprazole  40 mg Intravenous QAM AC     lidocaine         fosphenytoin (CEREBYX) intermittent infusion (maintenance)  100 mg PE Intravenous Q12H     sodium chloride (PF)  10 mL Intracatheter Q8H     heparin  5,000 Units Subcutaneous Q8H     thiamine  200 mg Oral or Feeding Tube Daily     albuterol  2.5 mg Nebulization Q6H     PRN medications:fentaNYL, polyethylene glycol, bisacodyl, LORazepam, potassium chloride, potassium chloride, potassium chloride, potassium chloride with lidocaine, potassium chloride, magnesium sulfate, magnesium sulfate, potassium phosphate (KPHOS) in D5W IV, potassium phosphate (KPHOS) in D5W IV, potassium phosphate (KPHOS) in D5W IV, potassium phosphate (KPHOS) in D5W IV, potassium phosphate (KPHOS) in D5W IV, ondansetron **OR** ondansetron, senna-docusate, sodium phosphate, naloxone, - MEDICATION INSTRUCTIONS -, lidocaine 4%, heparin lock flush, sodium chloride (PF), IV fluid REPLACEMENT ONLY  Data       Labotary Data:   All data was reviewed by me personally  CBC RESULTS:  Recent Labs   Lab Test  05/31/17 0440 05/30/17 0445 05/29/17 0435 05/28/17 0415 05/27/17   0555 05/26/17   1200   WBC  13.8*  13.1*  16.1*  18.0*  20.7*  12.2*   RBC  3.87*  3.54*  3.43*  3.81*  3.93*  3.90*   HGB  11.8*  10.8*  10.3*  11.6*  11.9*  11.8*   HCT  34.7*  32.2*  31.2*  34.7*  35.7*  35.8*   PLT  271  240  173  169  237  236     Basic Metabolic Panel:  Recent Labs   Lab Test  05/31/17 0440 05/30/17 0445 05/29/17 0435 05/28/17 0415  05/27/17   0555  05/26/17   1200   NA  140  144  140  135  135  137   POTASSIUM  3.8  3.7  3.2*  5.0  3.9  4.2   CHLORIDE  107  112*  109  104  103  105   CO2  20  25  25  23  25  22   BUN  10  10  14  13  12  15   CR  0.61*  0.70  0.83  0.87  0.83  1.20   GLC  62*  104*  108*  125*  163*  156*   DARINEL  8.4*  8.6  8.2*  8.0*  8.3*  7.7*      ABG:  Recent Labs   Lab Test  05/28/17   0612  05/27/17   0529  05/26/17   1155   PH  7.40  7.44  7.34*   PCO2  38  32*  39   PO2  109*  111*  151*   HCO3  23  22  21   O2PER   --    --   40%     Liver panel:  Recent Labs   Lab Test  05/30/17   0445  05/28/17   0415  05/27/17   0555  05/26/17   1730   PROTTOTAL  6.4*  6.3*  6.7*  6.4*   ALBUMIN  2.1*  2.5*  3.2*  2.8*   BILITOTAL  0.6  0.6  0.5  0.4   ALKPHOS  209*  57  52  54   AST  56*  31  25  22   ALT  25  18  17  18     Procalcitonin:   Recent Labs   Lab Test  05/26/17   1730   PCAL  <0.05  <0.05 ng/ml  Normal  Recommendation: Very low risk of bacterial infection.   Discourage antibiotics unless strong clinical suspicion for serious infection.       Coagulation  Recent Labs   Lab Test  05/26/17   1200   INR  1.10   PTT  28      Lipid Profile:No lab results found.  Thyroid Panel:No lab results found.   Vitamin B12:   Recent Labs   Lab Test  05/28/17   1700   B12  312      Vitamin D:  Recent Labs   Lab Test  05/28/17   1700   VITDT  11*     A1C: No lab results found.  Troponin I:   Recent Labs   Lab Test  05/26/17   1730   TROPI  <0.015  The 99th percentile for upper reference range is 0.045 ug/L.  Troponin values in   the range of 0.045 - 0.120 ug/L may be associated with risks of adverse   clinical events.       Ammonia:   Recent Labs   Lab Test  05/28/17   0415   MOOK  24     Most Recent 6 Bacteria Isolates From Any Culture (See EPIC Reports for Culture Details):  Recent Labs   Lab Test  05/26/17   1730  05/26/17   1200   CULT  No growth after 5 days  No growth after 5 days     UA Results:  Recent Labs   Lab Test  05/26/17   1515   COLOR  Light Yellow   APPEARANCE  Clear   URINEGLC  70*   URINEBILI  Negative   URINEKETONE  Negative   SG  1.015   UBLD  Negative   URINEPH  7.0   PROTEIN  Negative   NITRITE  Negative   LEUKEST  Negative   RBCU  0   WBCU  <1          Cardiac US:   The visual ejection fraction is estimated at 60-65%. No regional wall motion  abnormalities noted. The study was technically difficult. Limited views were obtained.       Neurophysiology:   5/26/17:  Encephalopathic EEG with significant asymmetry between hemispheres.        Imaging:   All imaging studies were reviewed personally  CT head 5/26/17:   1. Probable recent although not definitely acute border zone infarcts in the high right cerebral hemisphere.  2. No definite acute infarcts noted.  3. Diffuse cerebral volume loss and cerebral white matter changes consistent with chronic small vessel ischemic disease.    CT perfusion 5/26/17:   --There is delayed arrival of the contrast bolus to the right anterior cerebral and right middle cerebral artery territories consistent with the known right internal carotid artery occlusion. This results in mildly decreased cerebral blood flow but increased cerebral blood volume to the right KELIN and right MCA territories suggesting vasodilatation of the right KELIN and right MCA territories to accommodate for decreased cerebral blood flow. There are no findings to suggest acute ischemia or infarct. Perfusion defects corresponding to chronic right frontal and right parietal infarcts are noted. No other perfusion abnormalities.    CT angiography neck/head 5/26/17:   1. Age-indeterminate complete occlusion of the right internal carotid artery from the origin to the supraclinoid segment. Given chronic appearing right cerebral hemisphere infarcts, there is likely long-standing disease in the right internal carotid artery although the time of occlusion cannot be determined.  2. Minimal calcified atherosclerotic plaque at the origin of the left internal carotid artery that does not result in stenosis.  3. Otherwise, normal neck and head CTA.    MRI brain 5/28/17:   1. Evolving infarct in the right middle cerebral artery distribution. This area of infarction is significantly larger than on the previous MR scan of 5/26/2017. It now involves most of the anterior  division of the right middle cerebral. Smaller areas of infarction are seen in the right temporal lobe and the medial aspect of the right frontal lobe.  2. Right internal carotid artery is occluded.       Time Spent on this Encounter      Time:   Neurocritical care time:  I spent 50 minutes bedside and on the inpatient unit today managing the neurocritical care of Jair SERVANDO Fernández in relation to the issues listed in this note. Patient is critically ill       Ayesha Jacob, FNP-BC

## 2017-05-31 NOTE — PLAN OF CARE
Problem: Goal Outcome Summary  Goal: Goal Outcome Summary  SLP:Pt extubated yesterday and currently on BiPap. Not appropriate for ST eval at this time. RN consulted. Will continue following.

## 2017-05-31 NOTE — PLAN OF CARE
"Problem: Goal Outcome Summary  Goal: Goal Outcome Summary  PT: Evaluation completed, treatment initiated. 65 yo male found [place] a gas station, intubated in the field. 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 Difficult to discern baseline as pt with difficulty speaking, but does appear to be nodding \"yes\" and \"no \" appropriately.   Discharge Planner PT   Patient plan for discharge: None stated.  Current status: Max A x2 for bed mobility, sup<>sit. Core and proximal weakness significant, some proximal shoulder and hip strength preset on the L, otherwise no active movement of the LLE or LUE noted. R side WFL. Max A for EOB dangle x 10 min, poor sitting balance, strength, activity tolerance      Barriers to return to prior living situation: Pt lives with his nephew, unsure if the nephew is home during the day, pt nods \"yes\" to having stairs to enter and exit the home.  Recommendations for discharge: TCU  Rationale for recommendations: Pt demonstrates poor safety awarenss, activity tolerance, balance and function of the L UE and LE.       Entered by: Marquita Castle 05/31/2017 3:54 PM         "

## 2017-05-31 NOTE — PROGRESS NOTES
Pt was audibly wheezy this am and given albuterol nebs X3, placed on bipap 10/5 10 50%. Pt has recovered with exacerbation and placed on 3L Oxy plus mask with clear/diminished breath sounds. Will continue to follow and give Q6 albuterol nebs.

## 2017-05-31 NOTE — PLAN OF CARE
Problem: Goal Outcome Summary  Goal: Goal Outcome Summary  OT: Order received and chart reviewed. Per discussion with RN and PT, pt not appropriate for OT eval and intervention this morning.

## 2017-05-31 NOTE — PLAN OF CARE
Problem: Patient Care Overview (Adult)  Goal: Plan of Care Review  Outcome: No Change  Pt restless through the night. Follows some simple commands with right arm, able to shake head sometimes to yes or no. Left upper and lower moving spontaneously this am. Right tongue deviation with right droop. Ativan given per CIWA scale. Adequate urine output. Suctioned creamy secretions from back of throat, moderate cough, needs encouragement.

## 2017-05-31 NOTE — PROGRESS NOTES
"   05/31/17 1500   Quick Adds   Type of Visit Initial PT Evaluation   Living Environment   Lives With (nephew)   Living Arrangements house   Home Accessibility stairs within home;stairs to enter home   Transportation Available family or friend will provide;car   Self-Care   Activity/Exercise/Self-Care Comment Subjective difficult to obtain as pt with dry mouth, soft voice, mumbled speech.   Functional Level Prior   Ambulation 0-->independent   Transferring 0-->independent   Toileting 0-->independent   Bathing 0-->independent   Dressing 0-->independent   Eating 0-->independent   Communication 0-->understands/communicates without difficulty   Swallowing 0-->swallows foods/liquids without difficulty   Cognition 0 - no cognition issues reported   Prior Functional Level Comment Pt nods \"yes\" to questions of independence prior to admit with ADLs and mobility   General Information   Onset of Illness/Injury or Date of Surgery - Date 05/26/17   Referring Physician Bronson Vargas MD   Patient/Family Goals Statement None stated.   Pertinent History of Current Problem (include personal factors and/or comorbidities that impact the POC) 67 yo male found down at a gas station, intubated in the field. 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 Difficult to discern baseline as pt with difficulty speaking, but does appear to be nodding \"yes\" and \"no \" appropriately.    Precautions/Limitations fall precautions;oxygen therapy device and L/min  (3L oxy mask)   General Observations rectal tube   Cognitive Status Examination   Orientation person   Level of Consciousness alert;lethargic/somnolent   Follows Commands and Answers Questions 100% of the time  (Pt follows, needs repeat of cues at times and slow process)   Cognitive Comment Not formally assessed defer to OT   Pain Assessment   Patient Currently in Pain No " "  Integumentary/Edema   Integumentary/Edema Comments Thin and frail inappearance   Posture    Posture Forward head position;Protracted shoulders;Kyphosis   Range of Motion (ROM)   ROM Comment R LE WLF with AAROM, PROM on the L WFL slight tone noted. L UE WFL PROM, R UE WFL    Strength   Strength Comments Significant weakness at the core and demonstrates minimal muscle contractions proximally at the hip and shouder on natalie L. No active muscle strength noted distally on natalie L. R UE and LE with at least antigravity strength.   Bed Mobility   Bed Mobility Comments Max A x 2 sup>sit    Transfer Skills   Transfer Comments Unable to stand secondary to BP and decreased posture/strength   Balance   Balance Comments Sitting balance rquires at least Moderate assist at the trunk, variable at time Max A x 2.   General Therapy Interventions   Planned Therapy Interventions balance training;bed mobility training;gait training;neuromuscular re-education;strengthening;ROM;stretching;transfer training;progressive activity/exercise   Clinical Impression   Criteria for Skilled Therapeutic Intervention yes, treatment indicated   PT Diagnosis Difficulty wth gait   Influenced by the following impairments L sided weakness, fatigue, decreased activity tolerance poor balance   Functional limitations due to impairments Decreaed functional independence   Clinical Presentation Stable/Uncomplicated   Clinical Decision Making (Complexity) Moderate complexity   Therapy Frequency` daily   Predicted Duration of Therapy Intervention (days/wks) 1 week   Anticipated Discharge Disposition Transitional Care Facility   Risk & Benefits of therapy have been explained Yes   Patient, Family & other staff in agreement with plan of care Yes   [facility] [employer] TM \"6 Clicks\"   2016, Trustees of [facility], under license to [employer].  All rights reserved.   6 Clicks Short Forms Basic Mobility Inpatient Short Form   [facility] AM-PAC  \"6 " "Clicks\" V.2 Basic Mobility Inpatient Short Form   1. Turning from your back to your side while in a flat bed without using bedrails? 2 - A Lot   2. Moving from lying on your back to sitting on the side of a flat bed without using bedrails? 2 - A Lot   3. Moving to and from a bed to a chair (including a wheelchair)? 1 - Total   4. Standing up from a chair using your arms (e.g., wheelchair, or bedside chair)? 1 - Total   5. To walk in hospital room? 1 - Total   6. Climbing 3-5 steps with a railing? 1 - Total   Basic Mobility Raw Score (Score out of 24.Lower scores equate to lower levels of function) 8   Total Evaluation Time   Total Evaluation Time (Minutes) 10     "

## 2017-05-31 NOTE — PLAN OF CARE
Problem: Goal Outcome Summary  Goal: Goal Outcome Summary  Patient has had spells of elevated B/P - on and off BiPAP today - denies pain - worked with PT today - moves right side to command - spont movement with left arm - no movement with left leg - right side facial droop - family conference tomorrow at 9 am - some family came in to visit this evening - Ativan given for anxiety and restlessness with ETOH withdrawal

## 2017-05-31 NOTE — PROGRESS NOTES
Critical Care Progress Note      05/31/2017    Name: Jair Fernández MRN#: [id number]   Age: 66 year old YOB: 1950     [facility] Day# 5      Pt seen/evaluated at 8 am.           Problem List:   Active Problems:    Altered mental status, unspecified altered mental status type    Cerebrovascular accident (H)           Summary/Hospital Course:     Jair Fernández is a 66 year old male who presents with AMS.  EMS  Found pt unresponsive outside on the ground of a local [employer]. Brought to the ED  The patient was also hypotensive with systolic pressures in the 70s.  Intubated there.  Per pt's sister he has a history of neck cancer and surgery, heavy EtOH use, smoker. Weaned off pressors, now hypertensive, EEG showed no seizure, MRI 5/28 showed acutely worsened stroke--R sided infarcts.  Successfully extubated on 5/30, but wheezy morning of 5/31 and respiratory distress.  Bipap and continuous neb started.      Assessment and plan :     Jair Fernández IS a 66 year old male admitted on 5/26/2017 for altered mental status/CVA.   I have personally reviewed the daily labs, imaging studies, cultures and discussed the case with referring physician and consulting physicians.   My assessment and plan by system for this patient is as follows:    Neurology/Psychiatry:   1. Large R CVA:  Appreciate neurology/neuro CC input.    2. Analgesia:  Fentanyl prn  3. Anxiety:  Prn ativan  4.  H/o etoh use:  Monitor for withdrawal. Ativan prn.  Thiamine supplementation.    Cardiovascular:   1.Hypotension:  Resolved.  Allow permissive hypertension in setting of recent CVA.    Pulmonary/Ventilator Management:   1. Respiratory distress:  Very wheezy and tight, most likely COPD exacerbation.  Improved somewhat with albuterol neb, but still in distress.  Will trial albuterol continuous neb and bipap.  High probability of need for re-intubation.  2. COPD-exacerbation:  Continue nebs as above.  Will also burst steroids.     GI and  Nutrition :   1. Malnutrition:  TF.    2.  PPI for PUD prophy  3.  Refeeding syndrome:  Hypophos resolved.  aggressvie phos repletion.  Slow TF if needed.    Renal/Fluids/Electrolytes:   1. KENYATTA resolved.  Monitor.    Infectious Disease:   1. Leukocytosis improving.  cx neg x4d, procal neg.  Off abx.    Endocrine:   1. Monitor fsg:  Insulin vs dextrose prn.    Hematology/Oncology:   1. Leukocytosis:  Resolving  2.  Anemia: stable, no active bleeding.  3. pltlts ok.    ICU Prophylaxis:   1. DVT: Hep Subq/mechanical  2. IV Access - central access required and necessary for continued patient cares  3. Feeding - tf  4. Family Update: per neuro CC:  Made full code by sister Nikkie. Family mtg tomorrow at 9 am.   5. Disposition - critically ill        Key goals for next 24 hours:   1. Stopping zoysn  2. Family meeting tomorrow to discuss goals of care  3. Aggressive phos repletion.         Interim History:     Successfully extubated on 5/30, but wheezy morning of 5/31 and respiratory distress.  Bipap and continuous neb started.         Key Medications:       fosphenytoin (CEREBYX) intermittent infusion (maintenance)  100 mg PE Intravenous Q12H     sodium chloride (PF)  10 mL Intracatheter Q8H     heparin  5,000 Units Subcutaneous Q8H     thiamine  200 mg Oral or Feeding Tube Daily     albuterol  2.5 mg Nebulization Q6H       NaCl 100 mL/hr at 05/31/17 0909     propofol (DIPRIVAN) infusion Stopped (05/30/17 1504)     - MEDICATION INSTRUCTIONS -       IV fluid REPLACEMENT ONLY                 Physical Examination:   Temp:  [98.2  F (36.8  C)-99.1  F (37.3  C)] 98.2  F (36.8  C)  Heart Rate:  [] 95  Resp:  [10-45] 22  BP: (119-219)/() 122/75  FiO2 (%):  [40 %-50 %] 50 %  SpO2:  [89 %-100 %] 95 %        Intake/Output Summary (Last 24 hours) at 05/31/17 1224  Last data filed at 05/31/17 1100   Gross per 24 hour   Intake             2276 ml   Output             3590 ml   Net            -1314 ml       Wt Readings from  Last 4 Encounters:   05/31/17 55.1 kg (121 lb 7.6 oz)     BP - Mean:  [] 87  Ventilation Mode: PS  FiO2 (%): 50 %  Rate Set (breaths/minute): 16 breaths/min  Tidal Volume Set (mL): 450 mL  PEEP (cm H2O): 5 cmH2O  Pressure Support (cm H2O): 5 cmH2O  Oxygen Concentration (%): 40 %  Resp: 22    Recent Labs  Lab 05/28/17  0612 05/27/17  0529 05/26/17  1155   PH 7.40 7.44 7.34*   PCO2 38 32* 39   PO2 109* 111* 151*   HCO3 23 22 21   O2PER  --   --  40%       GEN: mild resp distress.  Tachycardic/hypertensive.  HEENT: head ncat, sclera anicteric, OP patent, trachea midline   PULM: somewhat labored, wheezy, very tight.    CV/COR: RRR S1S2 no gallop,  No rub, no murmur  ABD: soft nontender, no mass  EXT:  No Edema ;  Warm x4  NEURO: awake, alert follows.  Moves R well, hemiplegic on L.  SKIN: no obvious rash  LINES: clean, dry intact         Data:   All data and imaging reviewed     ROUTINE ICU LABS (Last four results)  CMP    Recent Labs  Lab 05/31/17  0440 05/30/17  0445 05/29/17  0435 05/28/17  0415 05/27/17  0555 05/26/17  1730    144 140 135 135  --    POTASSIUM 3.8 3.7 3.2* 5.0 3.9  --    CHLORIDE 107 112* 109 104 103  --    CO2 20 25 25 23 25  --    ANIONGAP 13 7 6 8 7  --    GLC 62* 104* 108* 125* 163*  --    BUN 10 10 14 13 12  --    CR 0.61* 0.70 0.83 0.87 0.83  --    GFRESTIMATED >90Non  GFR Calc >90Non  GFR Calc >90Non  GFR Calc 89 >90Non  GFR Calc  --    GFRESTBLACK >90African American GFR Calc >90African American GFR Calc >90African American GFR Calc >90African American GFR Calc >90African American GFR Calc  --    DARINEL 8.4* 8.6 8.2* 8.0* 8.3*  --    MAG 1.8 2.0 2.1 2.3 1.8 1.9   PHOS 3.3 3.0 1.9* 2.6 2.9 3.3   PROTTOTAL  --  6.4*  --  6.3* 6.7* 6.4*   ALBUMIN  --  2.1*  --  2.5* 3.2* 2.8*   BILITOTAL  --  0.6  --  0.6 0.5 0.4   ALKPHOS  --  209*  --  57 52 54   AST  --  56*  --  31 25 22   ALT  --  25  --  18 17 18     CBC    Recent  Labs  Lab 05/31/17  0440 05/30/17  0445 05/29/17  0435 05/28/17  0415   WBC 13.8* 13.1* 16.1* 18.0*   RBC 3.87* 3.54* 3.43* 3.81*   HGB 11.8* 10.8* 10.3* 11.6*   HCT 34.7* 32.2* 31.2* 34.7*   MCV 90 91 91 91   MCH 30.5 30.5 30.0 30.4   MCHC 34.0 33.5 33.0 33.4   RDW 15.6* 15.7* 15.3* 15.0    240 173 169     INR    Recent Labs  Lab 05/26/17  1200   INR 1.10     Arterial Blood Gas    Recent Labs  Lab 05/28/17  0612 05/27/17  0529 05/26/17  1155   PH 7.40 7.44 7.34*   PCO2 38 32* 39   PO2 109* 111* 151*   HCO3 23 22 21   O2PER  --   --  40%       All cultures:    Recent Labs  Lab 05/26/17  1730 05/26/17  1200   CULT No growth after 5 days No growth after 5 days     No results found for this or any previous visit (from the past 24 hour(s)).    D/w Dr. Brandt of neuro crit care.    AM labs (personally reviewed):  Lytes/creatinine acceptable/stable.  Leukocytosis stable/resolving 13.8.  hgb stable 11.8.  pltlts acceptable 271.    VBG (personally reviewed):  7.29/41/44/20:  Mild metabolic acidosis, but expect a lower ph because venous draw.  Normocapnic.    Billing: This patient is critically ill: Yes--respiratory distress with high probability of need for re-intubation. Total critical care time today 35 min.        ADDENDUM 1230:  Respiratory status improved.  Neb completed.  Weaning off bipap.  Will monitor off bipap for now.

## 2017-06-01 NOTE — PLAN OF CARE
Problem: Goal Outcome Summary  Goal: Goal Outcome Summary  Outcome: No Change  Alert, drowsy. Nonverbal. VSS on 30% BiPAP. Resting comfortably with PRN fentanyl for pain. Neuros grossly unchanged. Will continue to monitor.

## 2017-06-01 NOTE — PROGRESS NOTES
Social Work Progress Note  Pt chart reviewed. Pt discussed in interdisciplinary rounds.     Intervention: Per Evelia with Palliative, pt is now on comfort cares and would like a hospice meeting. Evelia also shared that family are leaning towards pt discharging to [facility] due to lack of funding for a LTC facility. George spoke with Sofía with  hospice and she is currently meeting with family. Pt has transfer orders to station 88. Sw to see family in the ICU if pt does not transfer immediately.     Team members notified: Bedside RN, CC, HUC,     Plan:  Anticipated Discharge Placement: [facility] ?  Barriers: None identified at this time.   Follow-up plan: Sw to continue to follow.     MIKIE Pena, [facility]  [phone]    ADDENDUM:   George spoke with  at [facility] ([facility]) and she shared that they are currently full.  does not anticipate having openings until next week.  could not give sw a day next week she would anticipate having availability. George faxed  a referral. Per Sofía's note ( hospice RN), family are interested in pt applying for MA if [facility] does not have availability.  Geogre contacted Karlos (financial counselor) *[phone] and he shared that he is available to help family complete the Application for payment of Long-Term Care Services. Karlos requested that family contacts him directly to arrange a time to complete the application. George left voice messages for pt's siblings Nikkie and Reid.     MIKIE Pena, [facility]  [phone]

## 2017-06-01 NOTE — PLAN OF CARE
Pt here from ICU. On comfort care. Eating for pleasure. Can make needs known. Left side out with droop. Mystatin for thrush.

## 2017-06-01 NOTE — PLAN OF CARE
Problem: Goal Outcome Summary  Goal: Goal Outcome Summary  PT-Noted patient transferring to comfort care so no further PT indicated. Will discontinue PT.

## 2017-06-01 NOTE — PROGRESS NOTES
Social Work Progress Note  Pt chart reviewed. Pt discussed in interdisciplinary rounds.     Intervention: Pt's Application for payment of Long-Term Care Services was completed by Karlos, and faxed into the county. Reid shared that he would like a referral sent to facilities within the [facility]. George sent a referral to [facility] and on [email or web address] [email or web address].     Team members notified: CC    Plan:  Anticipated Discharge Placement: SNF with hospice vs OLD   Barriers: bed availability.   Follow-up plan: George to continue to follow.     MIKIE Pena, [facility]  [phone]

## 2017-06-01 NOTE — PROGRESS NOTES
BRIEF NUTRITION NOTE:    Note TF has been discontinued and pt made comfort care.  Will be available only prn.    Mary Gruber, RD, LD, CNSC

## 2017-06-01 NOTE — PLAN OF CARE
Problem: Goal Outcome Summary  Goal: Goal Outcome Summary  Physical Therapy Discharge Summary     Reason for therapy discharge:    Discharged to Transferred to comfort care.      Progress towards therapy goal(s). See goals on Care Plan in [facility] electronic health record for goal details.  Goals not met.  Barriers to achieving goals:   No futher anticipation of improvement. .     Therapy recommendation(s):    No further therapy is recommended.

## 2017-06-01 NOTE — PLAN OF CARE
Problem: Goal Outcome Summary  Goal: Goal Outcome Summary  OT: Eval complete and Tx initiated. Pt admitted for altered mental status with possible recurrent seizures. Imaging indicated large R sided CVA. Prior to admit, pt living in house with nephew and reports I with ADLs, laundry, and meal prep. Currently, pt required min-mod A for sitting at EOB to complete grooming/hygiene tasks. Unable to perform functional transfers at time of OT eval due to L UE weakness and incoordination, decreased balance, impaired cognition and safety and L side inattentiveness. Pt participated well in L UE HEP. Pt would benefit from twice daily OT intervention to maximize I and safety with ADL participation. Pt eager and motivated to return to PLOF. Recommend ARU upon discharge.

## 2017-06-01 NOTE — PROGRESS NOTES
[facility]  Neuroscience and Spine [place]  Neuro-ICU Progress Note       Admission Date: 5/26/2017  Date of Service:06/01/2017   Hospital Day: 7     ADDENDUM: 6/1/2017  9:55 AM  Long discussion with the family, sister and brother. Explained prognosis, probably poor long term survival and poor rehab potential  After long discussion, family decided to limit care to comfort care only and transfer patient to hospice. They understand that this will lead to natural death  Patient will be DNR/DNI    Additional neurocritical care time - 40 minutes for withdrawal of care.   ----------------------------------------------------  Lance Brandt MD, PhD, [facility]      _________________________________     Main Plans for Today   --Continue supportive care  --meeting with family today  Assessment & Plan   #. (I63.9) Cerebrovascular accident (CVA), unspecified mechanism (H)  (primary encounter diagnosis)  --Significant acute extension of right sided stroke on MRI  --Previous multiple strokes  --Right ICA occlusion is likely chronic, given chronic right sided strokes  --no improvements  #. (G40.909) Recurrent seizures (H)  --received 4 mg of Ativan  --loaded with fosphenytoin  --EEG showed right attenuation, some left frontotemporal sharply contoured activity, but otherwise was unremarkable with no seizures  --phenytoin level 12.3 on 5/28- continue scheduled fosphenytoin 100 mg BID.  #. (M62.81) Left-sided weakness  --See above  #. (J96.00) Acute respiratory failure, unspecified whether with hypoxia or hypercapnia (HCC)  --extubated on 5/30  #. Cardiovascular  --No acute issues  #. Infection   --Normal WBC  --Management per medical intensivists.   #. Endocrine:   --Insulin protocol to keep blood sugars 100-150.  #. Heme/Onc:   --mild anemia, stable  #. Renal  --Sodium goal 140-155  #. Skin/Musculoskeletal:  --No issues  #. PT/OT/Speech  --continue evaluations   #. Nutrition  --Per speech therapy evaluation   #.  "ICU prophylaxis:   --Stress ulcer prophylaxis: Protonix  --VAP: per protocol  --DVT prophylaxis: mechanical + heparin      CODE STATUS: FULL        Interval History   Slightly more awake.  No movement on the left. Following commands on the right. Nodding/shaking head to communicate. Denies pain. Extubated on  evening. Off BiPAP. Was more hypertensive overnight. Meeting with family and palliative this morning (was cancelled yesterday).    Physical Exam     Vitals:  Vitals:    17 1129 17 1400 17 0600 17 0400   Weight: 54.2 kg (119 lb 6.4 oz) 54.1 kg (119 lb 4.3 oz) 55.1 kg (121 lb 7.6 oz) 54.9 kg (121 lb 0.5 oz)     Temp: 98.1  F (36.7  C) Temp src: Axillary BP: (!) 155/96  Heart Rate: 80 Resp: 13 SpO2: 96 % O2 Device: BiPAP/CPAP Oxygen Delivery: 3 LPM Height: 165.1 cm (5' 5\") Weight: 54.9 kg (121 lb 0.5 oz)        Tmax: Temp (24hrs), Av.2  F (36.8  C), Min:97.9  F (36.6  C), Max:98.5  F (36.9  C)    BP - Mean:  [] 117   I&O:    Intake/Output Summary (Last 24 hours) at 17 0753  Last data filed at 17 0600   Gross per 24 hour   Intake             2300 ml   Output             2525 ml   Net             -225 ml    I/O since admission: 3990.28  Bowel movement: --         General Appearance:   No acute distress  Neuro:       Mental Status Exam:  Off sedation, off BiPAP, more awake, not alert.  Mute. Eyes open, not tracking. Nods/shakes head to communicate. Follows commands on the right.  Mental status is decreased        Cranial Nerves:  Pupils 3 mm, reactive. Occulocephalis reflexes are present.  Corneal reflexes present. Face symmetric. Gag/Cough reflex present.Other CN are untestable           Motor:  Left sided plegia, good strength on the right       Reflexes:  Low DTR, toes equivocal       Sensory:  Untestable                    Coordination:   Untestable       Gait:  Untestable  Cardiovascular: Regular rate and rhythm, no m/r/g  Lungs: FiO2 (%): 30 %  Resp: 13  Both " hemithoraces are symmetrical, auscultation of lungs revealed no bronchovesicular sounds, expirium prolongation, wheezing, rhonci and crackles  Abdomen: Soft, not tender, not distended  Skin/Extremities: No clubbing, cyanosis, no edema     Medications   Infusions medications:    NaCl 100 mL/hr at 06/01/17 0427     propofol (DIPRIVAN) infusion Stopped (05/30/17 1504)     - MEDICATION INSTRUCTIONS -       IV fluid REPLACEMENT ONLY       Schedule medications:    methylPREDNISolone  62.5 mg Intravenous Q6H     fosphenytoin (CEREBYX) intermittent infusion (maintenance)  100 mg PE Intravenous Q12H     sodium chloride (PF)  10 mL Intracatheter Q8H     heparin  5,000 Units Subcutaneous Q8H     thiamine  200 mg Oral or Feeding Tube Daily     albuterol  2.5 mg Nebulization Q6H     PRN medications:labetalol, albuterol, fentaNYL, polyethylene glycol, bisacodyl, LORazepam, potassium chloride, potassium chloride, potassium chloride, potassium chloride with lidocaine, potassium chloride, magnesium sulfate, magnesium sulfate, potassium phosphate (KPHOS) in D5W IV, potassium phosphate (KPHOS) in D5W IV, potassium phosphate (KPHOS) in D5W IV, potassium phosphate (KPHOS) in D5W IV, potassium phosphate (KPHOS) in D5W IV, ondansetron **OR** ondansetron, senna-docusate, sodium phosphate, naloxone, - MEDICATION INSTRUCTIONS -, lidocaine 4%, heparin lock flush, sodium chloride (PF), IV fluid REPLACEMENT ONLY  Data       Labotary Data:   All data was reviewed by me personally  CBC RESULTS:  Recent Labs   Lab Test  06/01/17   0425  05/31/17   0440  05/30/17   0445  05/29/17   0435  05/28/17   0415  05/27/17   0555   WBC  8.9  13.8*  13.1*  16.1*  18.0*  20.7*   RBC  4.10*  3.87*  3.54*  3.43*  3.81*  3.93*   HGB  12.6*  11.8*  10.8*  10.3*  11.6*  11.9*   HCT  36.6*  34.7*  32.2*  31.2*  34.7*  35.7*   PLT  309  271  240  173  169  237     Basic Metabolic Panel:  Recent Labs   Lab Test  06/01/17   0425  05/31/17   0440  05/30/17   0445   05/29/17   0435  05/28/17   0415  05/27/17   0555   NA  138  140  144  140  135  135   POTASSIUM  4.5  3.8  3.7  3.2*  5.0  3.9   CHLORIDE  106  107  112*  109  104  103   CO2  23  20  25  25  23  25   BUN  17  10  10  14  13  12   CR  0.56*  0.61*  0.70  0.83  0.87  0.83   GLC  101*  62*  104*  108*  125*  163*   DARINEL  8.6  8.4*  8.6  8.2*  8.0*  8.3*     ABG:  Recent Labs   Lab Test  05/28/17   0612  05/27/17   0529  05/26/17   1155   PH  7.40  7.44  7.34*   PCO2  38  32*  39   PO2  109*  111*  151*   HCO3  23  22  21   O2PER   --    --   40%     Liver panel:  Recent Labs   Lab Test  05/30/17   0445  05/28/17   0415  05/27/17   0555  05/26/17   1730   PROTTOTAL  6.4*  6.3*  6.7*  6.4*   ALBUMIN  2.1*  2.5*  3.2*  2.8*   BILITOTAL  0.6  0.6  0.5  0.4   ALKPHOS  209*  57  52  54   AST  56*  31  25  22   ALT  25  18  17  18     Procalcitonin:   Recent Labs   Lab Test  05/26/17   1730   PCAL  <0.05  <0.05 ng/ml  Normal  Recommendation: Very low risk of bacterial infection.   Discourage antibiotics unless strong clinical suspicion for serious infection.       Coagulation  Recent Labs   Lab Test  05/26/17   1200   INR  1.10   PTT  28      Lipid Profile:No lab results found.  Thyroid Panel:No lab results found.   Vitamin B12:   Recent Labs   Lab Test  05/28/17   1700   B12  312      Vitamin D:  Recent Labs   Lab Test  05/28/17   1700   VITDT  11*     A1C: No lab results found.  Troponin I:   Recent Labs   Lab Test  05/26/17   1730   TROPI  <0.015  The 99th percentile for upper reference range is 0.045 ug/L.  Troponin values in   the range of 0.045 - 0.120 ug/L may be associated with risks of adverse   clinical events.       Ammonia:   Recent Labs   Lab Test  05/28/17   0415   MOOK  24     Most Recent 6 Bacteria Isolates From Any Culture (See EPIC Reports for Culture Details):  Recent Labs   Lab Test  05/26/17   1730  05/26/17   1200   CULT  No growth  No growth     UA Results:  Recent Labs   Lab Test  05/26/17   2538    COLOR  Light Yellow   APPEARANCE  Clear   URINEGLC  70*   URINEBILI  Negative   URINEKETONE  Negative   SG  1.015   UBLD  Negative   URINEPH  7.0   PROTEIN  Negative   NITRITE  Negative   LEUKEST  Negative   RBCU  0   WBCU  <1          Cardiac US:   The visual ejection fraction is estimated at 60-65%. No regional wall motion abnormalities noted. The study was technically difficult. Limited views were obtained.       Neurophysiology:   5/26/17:  Encephalopathic EEG with significant asymmetry between hemispheres.        Imaging:   All imaging studies were reviewed personally  CT head 5/26/17:   1. Probable recent although not definitely acute border zone infarcts in the high right cerebral hemisphere.  2. No definite acute infarcts noted.  3. Diffuse cerebral volume loss and cerebral white matter changes consistent with chronic small vessel ischemic disease.    CT perfusion 5/26/17:   --There is delayed arrival of the contrast bolus to the right anterior cerebral and right middle cerebral artery territories consistent with the known right internal carotid artery occlusion. This results in mildly decreased cerebral blood flow but increased cerebral blood volume to the right KELIN and right MCA territories suggesting vasodilatation of the right KELIN and right MCA territories to accommodate for decreased cerebral blood flow. There are no findings to suggest acute ischemia or infarct. Perfusion defects corresponding to chronic right frontal and right parietal infarcts are noted. No other perfusion abnormalities.    CT angiography neck/head 5/26/17:   1. Age-indeterminate complete occlusion of the right internal carotid artery from the origin to the supraclinoid segment. Given chronic appearing right cerebral hemisphere infarcts, there is likely long-standing disease in the right internal carotid artery although the time of occlusion cannot be determined.  2. Minimal calcified atherosclerotic plaque at the origin of the  left internal carotid artery that does not result in stenosis.  3. Otherwise, normal neck and head CTA.    MRI brain 5/28/17:   1. Evolving infarct in the right middle cerebral artery distribution. This area of infarction is significantly larger than on the previous MR scan of 5/26/2017. It now involves most of the anterior division of the right middle cerebral. Smaller areas of infarction are seen in the right temporal lobe and the medial aspect of the right frontal lobe.  2. Right internal carotid artery is occluded.       Time Spent on this Encounter      Time:   Neurocritical care time:  I spent 35 minutes bedside and on the inpatient unit today managing the neurocritical care of Jair SERVANDO Fernández in relation to the issues listed in this note. Patient is critically ill

## 2017-06-01 NOTE — PROGRESS NOTES
Pt on BIPAP 10/5 30% throughout the night, with SATs in the upper 90's. BBS diminished, schedule Q6 nebs given. Will continue to follow.  6/1/2017  Minoo Orlando

## 2017-06-01 NOTE — PROGRESS NOTES
Pt transitioned to comfort care following care conference with sister, brother, Palliative Service and Neurocritical Care Service present. Comfort care orders and transfer orders obtained. Report called to station 73 RN. Plan: Promote comfort and anticipate hospital discharge with palliative/hospice care.

## 2017-06-01 NOTE — CONSULTS
[facility]    Palliative Care Consultation     Jair Fernández  MRN# [id number]  Date of Admission:  5/26/2017  Date of Service (when I saw the patient): 06/01/17  Reason for consult: Consulted by Dr. 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 He was intubated in the field. He was noted to be hypotensive on admission, required pressor support. 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 He was also noted to have a large CVA with significant acute extension. He has lost function of the left side of his body. He is also noted to have recurrent seizures, requiring fosphenytoin. He stabilized from a respiratory standpoint and was able to be extubated on 5/30. We were consulted for goals of care, asked to attend family care conference today.     Symptoms/Recommendations   -Transition to comfort measures only, stop tele, VS, and lab monitoring   -DNR/DNI   -Transfer to station 88  -For oral thrush, ordered nystatin 500,000 units swish every 6hrs  -For nicotine dependence, ordered nicotine 21mg patch  -Other comfort medications ordered, including PO/SL PRN morphine for pain/dspnea, ativan PRN anxiety, haldol PRN agitation, robinul/atropine PRN secretions, bowel regimen   - consult for hospice  -Will require facility placement    Support/Coping  -Pt is not , no children  -2 siblings, Reid and Nikkie, present today   -Pt is not Zoroastrian or spiritual     Decisional Support, Goals of Care, Counseling & Coordination  Decisional Capacity Intact?  -No  Health Care Directive on File?  -No  Code Status/Resuscitation Preferences?  -Changed to DNR/DNI subsequent to today's visit      Discussion  Visited pt this AM. He is alert and able to follow commands. He denies pain. He is nonverbal, but indicating that he wants his face washed. Family is present and we elected to step into a private room for a care conference.    Present at today's conference was pt's sister Nikkie, brother Reid, DANII Cardona, Dr. Brandt, and myself. I introduced the scope of our practice to family. Discussed our potential roles for symptom management, support/coping, and decisional support (aka goals of care).     Dr. Brandt was able to explain to family the extent of Marvin's health concerns this hospitalization, including his occluded right carotid artery (possibly as a result from past radiation therapy), his seizure activity, and his acute and chronic strokes. Dr. Brandt worries that Marvin has lost function of the left side of his body, and it is unclear if he also lost part of his language center as well. We worry that given his smoking history and occluded right ICA, future strokes are very likely. Future seizure activity is also likely. His long term prognosis is poor.    At this time, Marvin has no ability to demonstrate capacity to make his own complex medical decisions. In the absence of a health care directive, and per next of kin rule, sister Nikkie and brother Reid will serve as surrogate decision makers.     Family describes Marvin's health and social health before this admission, which was very poor. He was on disability (previously worked as an ). He has polysubstance abuse (alcohol, tobacco, marijuana, and likely other substances). He is extremely malnourished. He never went to the doctor for maintenance of his health. They have no idea the status of his head and neck cancer, as he never followed up after treatment.     Family feels Marvin's will to live was dwindling. He was a very private man at baseline. They worried that he was having cognitive decline, possibly from family history of alzheimer's vs  "polysubstance abuse. He had no stable living situation and really has nowhere to go after the hospital. They believe he has no fiances and was likely on MA at some point, unsure if he still is.     We talk about options moving forward. 1. Pursue rehabilitation at long term care. Chances are low that he will want or be able to participate in rehabilitation. He will likely become sick again, probably from respiratory issues, recurrent strokes or seizures. He will likely die soon, but there is an opportunity to come back to the hospital to \"tune up\" whatever medical issues are reversible. This option may cause emotional suffering. 2. Pursue placement with hospice, focus on comfort and quality of life for whatever amount of time is left.    Family discussed at length and essentially agreed that Marvin would be very unhappy and unlikely to be able to rehabilitate from here. They want to ensure that we are not giving up on Marvin with a comfort care approach. I provided many reframes for family, that Marvin's body has already started the journey of dying, we stopped it by intervening this admission, but we can back off again and allow him to have a peaceful, comfortable death.    We talked briefly about comfort measures, including transferring to station 88 and giving medications to help with pain or difficulty breathing. We talk about nicotine replacement. We talk about placement with hospice and financial implications. Family is agreeable to a hospice consultation this AM.    I visited Marvin again this AM. He is comfortable and not in any distress. I shared with him that he is in the hospital because of a stroke. I told him we will keep him comfortable and find a nice place for him to live. He is very appreciative of this. He wants to eat and is happy about the prospect of ice cream.     Plan of care reviewed with bedside RADU Liriano, unit [facility], and Sofía Dao,  hospice liaison.     Thank you for involving us in the " care of this patient and family. We will continue to follow. Please do not hesitate to contact me with questions or concerns or the on-call provider for our team if evening or weekend.    Evelia SEN, [facility]  Palliative Medicine   Pager [phone]    Attestation:  Total time on the floor involved in the patient's care: 75 minutes  Total time spent in counseling/care coordination: >50%    Chief Complaint   Unresponsive, hypotensive     History is obtained from the patient, staff, family and extensive chart review.     Past Medical History    I have reviewed this patient's medical history and updated it with pertinent information if needed.   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  S/p neck dissection     Social History   Living situation: Essentially homeless. Had been living in his parent's home, but that recently sold this year. Recently was staying with his nephew Nikolai     Family system: Not , no children. 2 siblings, Nikkie and Reid. Father is in the 's home, has alzheimer's disease. Mother  many years ago.     Self-identified support system: Siblings    Employment/education: Was an , on disability     Activities/interests: Nothing, stayed home. Previously loved a dog, but the dog  and family believes he lost his will to live     Use of community resources: None     Zoroastrian affiliation: None    Involvement in alyce community: None     Impact of illness on patient: Pt has many serious medical issues and family believe he is not healthy or strong enough to rehabilitate from here     Family History   I have reviewed this patient's family history and updated it with pertinent information if needed.   No family history on file.    Allergies    No Known Allergies    Medications   Current Facility-Administered Medications Ordered in Epic   Medication Dose Route Frequency Last Rate Last Dose     morphine solution 5-10 mg  5-10 mg Oral Q2H PRN        Or     morphine sulfate HIGH CONCENTRATE (ROXANOL *CONCENTRATED*) 100 MG/5ML (HIGH CONC) solution 5-10 mg  5-10 mg Sublingual Q2H PRN         LORazepam (ATIVAN) injection 0.5-1 mg  0.5-1 mg Intravenous Q3H PRN        Or     LORazepam (ATIVAN) tablet 0.5-1 mg  0.5-1 mg Oral Q3H PRN        Or     LORazepam (ATIVAN) tablet 0.5-1 mg  0.5-1 mg Sublingual Q3H PRN         [START ON 6/4/2017] bisacodyl (DULCOLAX) Suppository 10 mg  10 mg Rectal Once PRN         atropine 1 % ophthalmic solution 1-2 drop  1-2 drop Sublingual Q1H PRN         glycopyrrolate (ROBINUL) injection 0.2-0.4 mg  0.2-0.4 mg Intravenous Q4H PRN         haloperidol (HALDOL) 2 MG/ML (HIGH CONC) solution 0.5-1 mg  0.5-1 mg Oral Q6H PRN         senna-docusate (SENOKOT-S;PERICOLACE) 8.6-50 MG per tablet 1 tablet  1 tablet Oral BID PRN         acetaminophen (TYLENOL) tablet 650 mg  650 mg Oral Q4H PRN        Or     acetaminophen (TYLENOL) Suppository 650 mg  650 mg Rectal Q4H PRN         nicotine Patch in Place   Transdermal Q8H         [START ON 6/2/2017] nicotine patch REMOVAL   Transdermal Daily         nicotine (NICODERM CQ) 21 MG/24HR 24 hr patch 1 patch  1 patch Transdermal Daily   1 patch at 06/01/17 1124     nystatin (MYCOSTATIN) suspension 500,000 Units  500,000 Units Oral 4x Daily         No current [facility]-ordered outpatient prescriptions on file.       Review of Systems   The comprehensive review of systems is negative other than noted here and in the assessment/plan.    Palliative Symptom Review (0=no symptom/no concern, 1=mild, 2=moderate, 3=severe):  Pain: 0  Shortness of Breath: 0    Physical Exam   Temp: 98.3  F (36.8  C) Temp src: Oral BP: (!) 155/96  Heart Rate: 80 Resp: 13 SpO2: 95 % O2 Device: Aerosol mask Oxygen Delivery: 10  LPM  Vitals:    05/30/17 1400 05/31/17 0600 06/01/17 0400   Weight: 54.1 kg (119 lb 4.3 oz) 55.1 kg (121 lb 7.6 oz) 54.9 kg (121 lb 0.5 oz)     CONSTITUTIONAL: Chronically ill, unkempt, emaciated man seen lying in the ICU bed in NAD, alert, orientation unable to be assessed. He is calm and cooperative, pleasant. Family present.   HEENT: thrush on tongue   RESPIRATORY: NL respiratory effort on RA  NEUROLOGIC: Alert, no verbal response. Left facial droop. Strength left upper and lower extremity 0/5, right extremities 3/5    Data   Results for orders placed or performed during the hospital encounter of 05/26/17 (from the past 24 hour(s))   Basic metabolic panel (AM Draw)   Result Value Ref Range    Sodium 138 133 - 144 mmol/L    Potassium 4.5 3.4 - 5.3 mmol/L    Chloride 106 94 - 109 mmol/L    Carbon Dioxide 23 20 - 32 mmol/L    Anion Gap 9 3 - 14 mmol/L    Glucose 101 (H) 70 - 99 mg/dL    Urea Nitrogen 17 7 - 30 mg/dL    Creatinine 0.56 (L) 0.66 - 1.25 mg/dL    GFR Estimate >90  Non  GFR Calc   >60 mL/min/1.7m2    GFR Estimate If Black >90   GFR Calc   >60 mL/min/1.7m2    Calcium 8.6 8.5 - 10.1 mg/dL   Magnesium (AM Draw)   Result Value Ref Range    Magnesium 2.5 (H) 1.6 - 2.3 mg/dL   Phosphorus (AM Draw)   Result Value Ref Range    Phosphorus 3.9 2.5 - 4.5 mg/dL   CBC (AM Draw)   Result Value Ref Range    WBC 8.9 4.0 - 11.0 10e9/L    RBC Count 4.10 (L) 4.4 - 5.9 10e12/L    Hemoglobin 12.6 (L) 13.3 - 17.7 g/dL    Hematocrit 36.6 (L) 40.0 - 53.0 %    MCV 89 78 - 100 fl    MCH 30.7 26.5 - 33.0 pg    MCHC 34.4 31.5 - 36.5 g/dL    RDW 15.1 (H) 10.0 - 15.0 %    Platelet Count 309 150 - 450 10e9/L   Glucose by meter   Result Value Ref Range    Glucose 98 70 - 99 mg/dL

## 2017-06-01 NOTE — PROGRESS NOTES
Intensivist service:    Patient's family met with palliative care and neurology this AM, plan for comfort-measures only with transfer to hospice.    On my visit patient is comfortable, breathing easily, with clear lungs.    Intensivist service will sign off.  Please re-consult as needed.

## 2017-06-01 NOTE — PROGRESS NOTES
[facility]: Met with patients brother Reid and his spouse Dulce along with pts sister Nikkie. We met outside pts room per request of the family. Reid shared that the comfort care option would be the best plan for his brother and that this is what his brother would choose for himself if he could make that decision. This was just an informational meeting today. Pt is eligible for the medicare hospice benefit. I explained the benefit periods, services available for support, medication and equipment coverage. At one point the family stated he had MA in 2007 but they are not sure what his current situation is. The pt is not able to go back to the previous living situation at his nephews and the family is looking to a facility. They are very interested in [facility] and would like the  to check into bed availability and to proceed from there. If there are no beds available at [facility] the pt/family will need help and guidance in the MA application or reapplication process and then pursuing a SNF bed with [facility] care. No consents signed. I left the hospice information sheet with pts brother Reid and my phone number for questions. Thank you for the referral. Sofía Dao RN, BSN  [facility] Admission nurse  [phone]

## 2017-06-01 NOTE — PROGRESS NOTES
06/01/17 0815   Quick Adds   Type of Visit Initial Occupational Therapy Evaluation   Living Environment   Lives With other relative(s) (specify)  (nephew)   Living Arrangements house   Home Accessibility stairs to enter home;stairs within home;bed and bath are not on the first floor   Number of Stairs to Enter Home 2   Number of Stairs Within Home 5   Transportation Available car;family or friend will provide  (Pt no longer drives. Family/Friends provide as needed)   Self-Care   Dominant Hand right   Usual Activity Tolerance good   Current Activity Tolerance fair   Equipment Currently Used at Home none   Functional Level Prior   Ambulation 0-->independent   Transferring 0-->independent   Toileting 0-->independent   Bathing 0-->independent   Dressing 0-->independent   Eating 0-->independent   Communication 0-->understands/communicates without difficulty   Swallowing 0-->swallows foods/liquids without difficulty   Cognition 0 - no cognition issues reported   Fall history within last six months yes   Number of times patient has fallen within last six months 5   General Information   Onset of Illness/Injury or Date of Surgery - Date 05/26/17   Referring Physician FILOMENA Vargas MD   Patient/Family Goals Statement None stated at time of eval.    Additional Occupational Profile Info/Pertinent History of Current Problem Admitted for altered mental status due to recurrent seizures. Imaging indicated large R sided CVA. PMH of ETOH usage.    Precautions/Limitations fall precautions;oxygen therapy device and L/min;swallowing precautions  (No supplemental O2 at baseline)   Cognitive Status Examination   Orientation person;time   Level of Consciousness alert;confused   Able to Follow Commands WNL/WFL   Personal Safety (Cognitive) at risk behaviors demonstrated   Memory impaired   Visual Perception   Visual Perception No deficits were identified   Visual Perception Comments Due to limited command following, unable to assess vision.  Moderate R gaze preference noted.    Sensory Examination   Sensory Comments Pt reports L UE numbness/tingling. Unable to formally test B UE sensation due to limited command following   Pain Assessment   Patient Currently in Pain No   Range of Motion (ROM)   ROM Quick Adds Other (describe)   ROM Comment R UE WNL. No active ROM of L UE. Full PROM of L UE.    Strength   Manual Muscle Testing Quick Adds Other   Strength Comments R UE 3/5 and L UE 1/5   Hand Strength   Hand Strength Comments No L grasp noted. R grasp strong   Coordination   Upper Extremity Coordination Left UE impaired   Mobility   Bed Mobility Bed mobility skill: Rolling/Turning;Bed mobility skill: Scooting/Bridging;Bed mobility skill: Sit to supine;Bed mobility skill: Supine to sit;Bed mobility analysis   Bed Mobility Skill: Rolling/Turning   Level of [place] - Bed Mobility Skill Rolling Turning moderate assist (50% patients effort)   Physical Assist/Nonphysical Assist 2 persons   Bed Mobility Skill: Scooting/Bridging   Level of [place]: Scooting/Bridging moderate assist (50% patients effort)   Physical Assist/Nonphysical Assist: Scooting/Bridging 2 persons   Bed Mobility Skill: Sit to Supine   Level of [place]: Sit/Supine moderate assist (50% patients effort)   Physical Assist/Nonphysical Assist: Sit/Supine 2 persons   Bed Mobility Skill: Supine to Sit   Level of [place]: Supine/Sit moderate assist (50% patients effort)   Physical Assist/Nonphysical Assist: Supine/Sit 2 persons   Bed Mobility Analysis   Bed Mobility Limitations cognitive deficits;decreased ability to use arms for pushing/pulling   Impairments Contributing to Impaired Bed Mobility impaired balance;decreased strength   Transfer Skill: Bed to Chair/Chair to Bed   Level of [place]: Bed to Chair unable to perform   Transfer Skill: Sit to Stand   Level of [place]: Sit/Stand unable to perform   Transfer Skill: Toilet Transfer   Level of [place]: Toilet  "unable to perform   Upper Body Dressing   Level of [place]: Dress Upper Body maximum assist (25% patients effort)   Lower Body Dressing   Level of [place]: Dress Lower Body maximum assist (25% patients effort)   Toileting   Level of [place]: Toilet maximum assist (25% patients effort)   Grooming   Level of [place]: Grooming moderate assist (50% patients effort)   Instrumental Activities of Daily Living (IADL)   Previous Responsibilities meal prep;housekeeping;laundry;finances   Activities of Daily Living Analysis   Impairments Contributing to Impaired Activities of Daily Living balance impaired;cognition impaired;coordination impaired;strength decreased   General Therapy Interventions   Planned Therapy Interventions ADL retraining;cognition;neuromuscular re-education;strengthening;transfer training   Clinical Impression   Criteria for Skilled Therapeutic Interventions Met yes, treatment indicated   OT Diagnosis Decreased I and safety with ADLs   Influenced by the following impairments L UE weakness and incoordination; Impaired cognition and safety; L side inattentiveness; Decreased balance   Assessment of Occupational Performance 5 or more Performance Deficits   Identified Performance Deficits Toileting; Dressing; Bathing; Social skills; Home mgmt; Hygiene   Clinical Decision Making (Complexity) High complexity   Therapy Frequency 2 times/day   Predicted Duration of Therapy Intervention (days/wks) 5 days   Anticipated Discharge Disposition Acute Rehabilitation Facility   Risks and Benefits of Treatment have been explained. Yes   Patient, Family & other staff in agreement with plan of care Yes   [facility] [employer] TM \"6 Clicks\"   2016, Trustees of [facility], under license to [employer].  All rights reserved.   6 Clicks Short Forms Daily Activity Inpatient Short Form   [facility] AM-PAC  \"6 Clicks\" Daily Activity Inpatient Short Form   1. Putting on and taking off regular " lower body clothing? 1 - Total   2. Bathing (including washing, rinsing, drying)? 1 - Total   3. Toileting, which includes using toilet, bedpan or urinal? 1 - Total   4. Putting on and taking off regular upper body clothing? 2 - A Lot   5. Taking care of personal grooming such as brushing teeth? 2 - A Lot   6. Eating meals? 3 - A Little   Daily Activity Raw Score (Score out of 24.Lower scores equate to lower levels of function) 10   Total Evaluation Time   Total Evaluation Time (Minutes) 12

## 2017-06-02 PROBLEM — Z71.89 ACP (ADVANCE CARE PLANNING): Chronic | Status: ACTIVE | Noted: 2017-01-01

## 2017-06-02 NOTE — PROGRESS NOTES
Social Work Progress Note  Pt chart reviewed. Pt discussed in interdisciplinary rounds.     Intervention: Sw received a voice message from [facility] stating that they currently do not have beds available. George spoke with Durga at [facility] and he explained that he would like bank statements faxed to him alongside d/c MA LTC application. George contacted pt's brother Reid and updated him of the above. Reid stated that he is unsure of if pt has bank statements at home, but he plans to ask pt's nephew Nikolai who pt lived with to look around the house.   George also spoke with Eli at [facility] and she explained that they would like a Health Care Directive or a financial POA form completed in order to accept pt. Reid has agreed to be the decision maker for both health and financial.     Both documents were completed and notarized. Eli at [facility] informed sw that she would like a copy of both forms faxed to her. George faxed both forms, and confirmed the bed with Eli at [facility]. George arranged a stretcher transport with Irma at  for 1400 on 6/3/17. George spoke with pt brother Reid and was able to add his address to the said forms. Reid also stated that he is unable to meet with hospice tomorrow at 1300 but will come in tonight. George informed him that a copy of both forms will be left here for his records. George also spoke with Sofía ( Hospice RN) and she stated that a liaison will meet with pt tomorrow at 1300 to sign consents.     Team members notified: Bedside RN, &CC    Plan:  Anticipated Discharge Placement: LTC with hospice.  Barriers: None identified at this time.   Follow-up plan: George to continue to follow.     Nhung Lockhart, MIKIE, MERCYSW  [phone]

## 2017-06-02 NOTE — PLAN OF CARE
Problem: Goal Outcome Summary  Goal: Goal Outcome Summary  Outcome: Improving  Pt on comfort cares.  Able to voice needs, oriented.  Generalized pain, Tylenol and t/r effective.  Frequent cough.  Bedrest.  Reg diet but little appetite.  Joe WNSERVANDO.  Plan for d/c to LTC pending bed availability, nrsng cont to monitor.

## 2017-06-02 NOTE — PROGRESS NOTES
SPIRITUAL HEALTH SERVICES  Progress Note  [facility]    On call  asked to come to room and fill out HCD and POA paperwork.  In talking with the patient it was clear that he wanted his brother Reid to be his health care agent and POA.  Patient was clear about both of these decisions and wanted to fill out the long form so that he could have some say in his end-of-life wishes.  I filled out the form as he stated and had a notary come to sign the forms. I did try to call his brother but did not get an answer.  THe SW on the unit will follow up and also give the brother a copy and send the original to HIM.   Plan: Patient reports that he has no further needs and declined further services of  at this time.     Jane Cárdenas  Chaplain Resident  Pager [phone]

## 2017-06-02 NOTE — PLAN OF CARE
Problem: Goal Outcome Summary  Goal: Goal Outcome Summary  Occupational Therapy Discharge Summary     Reason for therapy discharge:    No further expectations of functional progress.     Progress towards therapy goal(s). See goals on Care Plan in [facility] electronic health record for goal details.  Goals not met.  Barriers to achieving goals:   Transitioned to comfort cares.     Therapy recommendation(s):    No further therapy is recommended. Pt transitioned to comfort cares

## 2017-06-02 NOTE — PLAN OF CARE
Problem: Goal Outcome Summary  Goal: Goal Outcome Summary  OT: pt has transitioned to comfort cares, will d/c from OT, see discharge summary

## 2017-06-02 NOTE — PLAN OF CARE
Problem: Goal Outcome Summary  Goal: Goal Outcome Summary  Outcome: No Change  Comfort care continued. A&O x3-4, quite delayed slurred speech, able to let needs be known, answers questions appropriately. Freq cough, usually with oral intact at time. Reg diet for comfort, little appetite, reused. T&R and frequent oral care given. Denies pain. Diaz patent. Advance direct orders placed. Plans to d/c to facility, placement pending. Nrsg will continue to monitor.

## 2017-06-02 NOTE — PROGRESS NOTES
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 He was intubated in the field. He was noted to be hypotensive on admission, required pressor support. 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 He was also noted to have a large CVA with significant acute extension. He has lost function of the left side of his body. He is also noted to have recurrent seizures, requiring fosphenytoin. He stabilized from a respiratory standpoint and was able to be extubated on 5/30. We were consulted for goals of care, he transitioned to comfort measures only with hospice disposition on 6/1.     Symptoms/Recommendations  -Continue comfort measures only  -For oral thrush, ordered nystatin 500,000 units swish every 6hrs  -For nicotine dependence, ordered nicotine 21mg patch  -Other comfort medications ordered, including PO/SL PRN morphine for pain/dspnea, ativan PRN anxiety, haldol PRN agitation, robinul/atropine PRN secretions, bowel regimen   - consult for hospice and placement     Support/Coping  -Pt is not , no children  -2 siblings, Reid and Nikkie, met with them yesterday   -Pt is not Ep[employer]opalian or spiritual     Decisional Support, Goals of Care, Counseling & Coordination  Decisional Capacity Intact?  -No  Health Care Directive on File?  -No  Code Status/Resuscitation Preferences?  -DNR/DNI     Discussion  Visited with Marvin reid AM. He is happily watching TV. He is able to speak today. He is able to tell me he is in the hospital for a stroke. He is disengaged  with poor eye contact. He is not able to talk to me further about his medical issues. I shared with him that the stroke has affected the left side of his body. I shared with him that even with aggressive rehabilitation, we are not sure he would ever be able to move the left side of his body again or be functionally independent again.     Based on this conversation, I can confirm that I do not believe Marvin has the ability to make complex medical decisions for himself.     I shared with Marvin that our focus is to keep him comfortable and find him a nursing facility to live. An MA application has been submitted. Marvin provides indication that he is content with this plan of care. He offers no further questions or concerns at this time.     Case reviewed with bedside RN Antolin and unit CC Dominique.     Thank you for involving us in the care of this patient and family. We will sign off at this time. Please do not hesitate to contact me with questions or concerns or the on-call provider for our team if evening or weekend.    Evelia SEN, [facility]  Palliative Medicine   Pager [phone]    Attestation:  Total time on the floor involved in the patient's care: 35 minutes  Total time spent in counseling/care coordination: >50%    Interval History   Transitioned to comfort measures yesterday, transferred to station 77. MA application submitted, LTC referrals sent.     Medications   Current Facility-Administered Medications Ordered in Epic   Medication Dose Route Frequency Last Rate Last Dose     morphine solution 5-10 mg  5-10 mg Oral Q2H PRN        Or     morphine sulfate HIGH CONCENTRATE (ROXANOL *CONCENTRATED*) 100 MG/5ML (HIGH CONC) solution 5-10 mg  5-10 mg Sublingual Q2H PRN         LORazepam (ATIVAN) injection 0.5-1 mg  0.5-1 mg Intravenous Q3H PRN        Or     LORazepam (ATIVAN) tablet 0.5-1 mg  0.5-1 mg Oral Q3H PRN        Or     LORazepam (ATIVAN) tablet 0.5-1 mg  0.5-1 mg Sublingual Q3H PRN         [START ON 6/4/2017]  bisacodyl (DULCOLAX) Suppository 10 mg  10 mg Rectal Once PRN         atropine 1 % ophthalmic solution 1-2 drop  1-2 drop Sublingual Q1H PRN         glycopyrrolate (ROBINUL) injection 0.2-0.4 mg  0.2-0.4 mg Intravenous Q4H PRN         haloperidol (HALDOL) 2 MG/ML (HIGH CONC) solution 0.5-1 mg  0.5-1 mg Oral Q6H PRN         senna-docusate (SENOKOT-S;PERICOLACE) 8.6-50 MG per tablet 1 tablet  1 tablet Oral BID PRN         acetaminophen (TYLENOL) tablet 650 mg  650 mg Oral Q4H PRN   650 mg at 06/01/17 2222    Or     acetaminophen (TYLENOL) Suppository 650 mg  650 mg Rectal Q4H PRN         nicotine Patch in Place   Transdermal Q8H         nicotine patch REMOVAL   Transdermal Daily         nicotine (NICODERM CQ) 21 MG/24HR 24 hr patch 1 patch  1 patch Transdermal Daily   1 patch at 06/02/17 0904     nystatin (MYCOSTATIN) suspension 500,000 Units  500,000 Units Oral 4x Daily   500,000 Units at 06/02/17 0904     No current [facility]-ordered outpatient prescriptions on file.       Physical Exam   Temp: 98.2  F (36.8  C) Temp src: Oral BP: 130/84  Heart Rate: 101 Resp: 16 SpO2: 96 % O2 Device: None (Room air)    Vitals:    05/30/17 1400 05/31/17 0600 06/01/17 0400   Weight: 54.1 kg (119 lb 4.3 oz) 55.1 kg (121 lb 7.6 oz) 54.9 kg (121 lb 0.5 oz)     CONSTITUTIONAL: Chronically ill, unkempt, emaciated man seen sitting up in bed watching TV in NAD, alert, oriented to name and location. He is calm and generally cooperative, yet disengaged.   HEENT: thrush on tongue   RESPIRATORY: NL respiratory effort on RA  NEUROLOGIC: Alert, able to speak, but gives short one word answers. Left facial droop. Strength not assessed today     Data   No results found for this or any previous visit (from the past 24 hour(s)).

## 2017-06-02 NOTE — PROGRESS NOTES
SPIRITUAL HEALTH SERVICES  Progress Note  [facility]    Patient had palliative care conference ( see Note) and from that note and speaking with staff at rounds today there does not appear to be a need for SH services at this time.  Plan: Of course SH is available if the needs change or family/patient requests.     Jane Cárdenas  Chaplain Resident  Pager [phone]

## 2017-06-02 NOTE — PROGRESS NOTES
[facility]    Hospitalist Progress Note    Date of Service (when I saw the patient): 06/02/2017    Assessment & Plan   Jair Fernández is a 66 year old male who was admitted on 5/26/2017 with a past medical history, suspected for chronic obstructive pulmonary disease, alcohol abuse, tobacco abuse, chronic right-sided internal carotid artery occlusion and history of multiple CVAs who was admitted to [facility]ensivist Service on 05/26/2017 following intubation due to recent right cerebral infarct with noted left-sided hemiparesis and facial droop and ongoing seizure activity. The Hospitalist Service has been consulted to assist with transfer of care and transition to comfort care.   1.  Comfort care:  In the setting of recently right-sided cerebral infarct and ongoing left-sided hemiplegia and facial droop with noted poor prognosis, poor rehabilitation potential and poor her long-term survival, patient and his family have elected to limit care to comfort care and transfer patient to hospice. Comfort care orders have been placed as well as medications.  Nonessential medications have all been discontinued. Code status- DNR/DNI. Hospice consult has already occurred and there is ongoing plan and work towards transitioning patient to [facility].   2.  Thrush:  Will continue with nystatin 500,000 units suspension 4 times daily.   3.  Tobacco dependence:  Will continue with Nicoderm patch.   4.  Fluids, electrolytes and nutrition:  The patient will be placed on a regular diet and able to eat for pleasure.  No further IV fluids are required and no further laboratory drawing.       CODE STATUS:  DNR/DNI.       DISPOSITION:  Currently working on placement to [facility], likely occurring in the next several days.     Foster Rosa MD  Pager:  [phone]  Text Page (7 am to 6 pm)      Interval History   No new issues overnight.     -Data reviewed today: I  reviewed all new labs and imaging results over the last 24 hours.   Physical Exam   Temp: 98.2  F (36.8  C) Temp src: Oral BP: 130/84  Heart Rate: 101 Resp: 16 SpO2: 96 % O2 Device: None (Room air)    Vitals:    05/30/17 1400 05/31/17 0600 06/01/17 0400   Weight: 54.1 kg (119 lb 4.3 oz) 55.1 kg (121 lb 7.6 oz) 54.9 kg (121 lb 0.5 oz)     Vital Signs with Ranges  Temp:  [98.2  F (36.8  C)] 98.2  F (36.8  C)  Heart Rate:  [101] 101  Resp:  [14-16] 16  BP: (130)/(84) 130/84  SpO2:  [96 %] 96 %  I/O last 3 completed shifts:  In: 100 [P.O.:100]  Out: 225 [Urine:225]    GENERAL:  Awake. Laying in bed.   EYES: EOMI  NECK:  Supple  CARDIAC: RRR s1s2  RESPIRATORY: Breathing unlabored.  Clear to ausculation, no wheezes, rhonchi, or rales.  GI:  Soft, nontender, nondistended, no rebound or guarding.  Active bowel sounds.  LYMPHATICS:  No cyanosis or edema.  Distal pulses palpable.  SKIN: Multiple tattoos.   NEURO: Left hemiplegia      Medications        nicotine   Transdermal Q8H     nicotine   Transdermal Daily     nicotine  1 patch Transdermal Daily     nystatin  500,000 Units Oral 4x Daily       Data     Recent Labs  Lab 06/01/17  0425 05/31/17  0440 05/30/17  0445  05/28/17  0415  05/26/17  1730   WBC 8.9 13.8* 13.1*  < > 18.0*  < >  --    HGB 12.6* 11.8* 10.8*  < > 11.6*  < >  --    MCV 89 90 91  < > 91  < >  --     271 240  < > 169  < >  --     140 144  < > 135  < >  --    POTASSIUM 4.5 3.8 3.7  < > 5.0  < >  --    CHLORIDE 106 107 112*  < > 104  < >  --    CO2 23 20 25  < > 23  < >  --    BUN 17 10 10  < > 13  < >  --    CR 0.56* 0.61* 0.70  < > 0.87  < >  --    ANIONGAP 9 13 7  < > 8  < >  --    DARINEL 8.6 8.4* 8.6  < > 8.0*  < >  --    * 62* 104*  < > 125*  < >  --    ALBUMIN  --   --  2.1*  --  2.5*  < > 2.8*   PROTTOTAL  --   --  6.4*  --  6.3*  < > 6.4*   BILITOTAL  --   --  0.6  --  0.6  < > 0.4   ALKPHOS  --   --  209*  --  57  < > 54   ALT  --   --  25  --  18  < > 18   AST  --   --  56*  --  31   < > 22   TROPI  --   --   --   --   --   --  <0.015The 99th percentile for upper reference range is 0.045 ug/L.  Troponin values in the range of 0.045 - 0.120 ug/L may be associated with risks of adverse clinical events.   < > = values in this interval not displayed.    No results found for this or any previous visit (from the past 24 hour(s)).

## 2017-06-03 NOTE — PLAN OF CARE
Problem: Goal Outcome Summary  Goal: Goal Outcome Summary  Outcome: No Change  Patient here for comfort cares. A&O x4. Diaz patent. Pain and symptoms assessed every 4 hours: no paint reported or observed and no new symptoms. Quiet slurred speech. Turned and repositioned Q2H. Frequent oral cares. Regular diet; patient coughs with oral intake. Up with A2 + lift. Discharge to facility pending placement. Continue to monitor and follow POC.

## 2017-06-03 NOTE — PLAN OF CARE
Problem: Goal Outcome Summary  Goal: Goal Outcome Summary  Outcome: No Change  A&Ox4. Comfort cares. Denies pain. Diaz patent. Turned and repositioned Q2 along with oral cares PRN and as pt allows. Left arm 2 skin tears dressing CDI. Regular diet. Pt coughs with oral intake. Plan discharge pending placement at other facility.

## 2017-06-03 NOTE — PROGRESS NOTES
Social Work Progress Note  Pt chart reviewed. Pt discussed in interdisciplinary rounds.     Intervention: Sw completed and faxed HE PCS form in for transportation. Sw completed PAS-R, and faxed it alongside d/c orders, and scripts to [facility]. Pt to see hospice RN at 1300 to sign hospice consents.     PAS-RR    D: Per DHS regulation, SW completed and submitted PAS-RR to [place] [employer] Direct Connect via the Senior LinkAge Line.  PAS-RR confirmation # is : [id number]  P: Further questions may be directed to Senior LinkAge Line at #[phone], option #4 for PAS-RR staff.    Team members notified: Bedside RN, CC & HUC    Plan:  Anticipated Discharge Placement:  [facility] (P: [phone], F: [phone])  Barriers: None identified at this time.   Follow-up plan: No plans to follow. Sw available should a need arise.      MIKIE Pena, [facility]  [phone]

## 2017-06-03 NOTE — PLAN OF CARE
Problem: Goal Outcome Summary  Goal: Goal Outcome Summary  Outcome: No Change  Neuros - alert, cooperative, left hemiplegia, following direction, slurred speech, quiet, slow to respond, oriented. Comfort cares provided.  Tachy.  Lung sounds diminished, occasional coughing with intake noted.  Turned and repositioned every 1-2 hours and prn. Regular diet with set up/supervision/fair intake - coughing, so po intake stopped. Bedrest with 2 assist/turns & boosting.  Denies pain. Diaz patent, low urine output.  Incontinent of stool, soft bm today. Discharged to TCU this afternoon at 1400 via ambulance stretcher.  Patient agreed with discharged plan, no unmet needs upon discharge.

## 2017-06-03 NOTE — PROVIDER NOTIFICATION
Paged Dr. Rosa Patient need order to d/c PICC, patient d/cing at 1400 today to hospice. please advise or place order.

## 2017-06-03 NOTE — DISCHARGE SUMMARY
DATE OF ADMISSION: 05/26/2017   DATE OF DISCHARGE: 06/03/2017      DISCHARGE DIAGNOSES:   1.  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   2.  Right internal carotid artery occlusion.   3.  Oral thrush.   4.  Tobacco abuse.   5.  Chronic obstructive pulmonary disease.   6.  Severe malnutrition     DISCHARGE MEDICATIONS:   1.  Morphine sulfate high concentration 5-10 mg under the tongue every 10 hours as needed for moderate to severe pain or dyspnea.   2.  Acetaminophen 2 tabs 325 mg each every 4 hours as needed for mild pain.   3.  Ativan 1-2 tablets under the tongue every 3 hours as needed for dyspnea, nausea or anxiety.   4.  Haldol 0.5-1 mg every 6 hours as needed for agitation or nausea.   5.  Dulcolax suppository 10 mg rectally daily as needed for constipation.   6.  Senokot-S 1 tab twice a day as needed for constipation.   7.  Nicotine patch 21 mg per 24 hour patch applied daily.   8.  Mycostatin 500,000 units 4 times daily, continue until oral thrush clears.   9.  Atropine 1% ophthalmic 1-2 drops under the tongue every hour as needed for excessive oral secretions.      BRIEF HISTORY/HOSPITAL COURSE:  Mr. Jair Fernández is a 66-year-old man who presented with left-sided weakness and altered mental status.  The patient was ultimately found to have a right MCA stroke with occlusion of the right internal carotid artery.  The patient was initially intubated and hospitalized in the ICU for airway protection.  He was followed by Neuro Critical Care Service and intensivist.  He was seen in consultation by Vascular Surgery service regarding his internal carotid artery occlusion.  The Palliative Care Service was consulted after thoughtful consideration by multiple providers and family; decision was made to pursue a comfort care approach.  The patient will be transferred to hospice care facility later today.      OBJECTIVE:   GENERAL:  On day of discharge the patient is awake, sits up in bed to watch  TV, no acute distress.   LUNGS:  Clear.   CARDIOVASCULAR:  Regular rate and rhythm, S1, S2.   ABDOMEN:  Bowel sounds positive, nontender, nondistended.   EXTREMITIES:  No edema.   NEURO:  Left hemiplegia.        ASSESSMENT AND PLAN: At this time, patient is transferring to hospice cares at a facility.  Hospice medications have been ordered for discharge.      NOTE:   Thirty-five minutes spent on discharge planning and cares.         DUNCAN DO MD             D: 2017 12:03   T: 2017 12:48   MT: [id number]      Name:     TAMIKA GRIDER   MRN:      [id number]        Account:        [id number]   :      1950           Admit Date:                                       Discharge Date:       Document: [id number]

## 2017-06-03 NOTE — DISCHARGE INSTRUCTIONS
Pt to d/c to [facility] (P: [phone], F: [phone])  With hospice at 1400 via .

## 2023-11-17 NOTE — PLAN OF CARE
Problem: Patient Care Overview (Adult)  Goal: Plan of Care Review  Outcome: No Change  Extubated at 1606 to aerosol facemask. Remains out on L and moving R appropriately. Increased restlessness with htn 200's/100's. Dr. Vargas notified and rec'd order for CIWA protocol. Scoring 11-15. Pt able to pull Keofeed out while L hand restrained. Dr. Vargas ok with waiting to replace keofeed until after pt has swallow study done in am. Pt attempting to pull at [place] so mitt placed along with restraint. Cont to monitor.        Private Auto Walk in